# Patient Record
Sex: FEMALE | Race: WHITE | NOT HISPANIC OR LATINO
[De-identification: names, ages, dates, MRNs, and addresses within clinical notes are randomized per-mention and may not be internally consistent; named-entity substitution may affect disease eponyms.]

---

## 2021-02-03 ENCOUNTER — NON-APPOINTMENT (OUTPATIENT)
Age: 56
End: 2021-02-03

## 2021-02-09 ENCOUNTER — NON-APPOINTMENT (OUTPATIENT)
Age: 56
End: 2021-02-09

## 2021-02-09 ENCOUNTER — APPOINTMENT (OUTPATIENT)
Dept: HEART AND VASCULAR | Facility: CLINIC | Age: 56
End: 2021-02-09
Payer: COMMERCIAL

## 2021-02-09 VITALS
DIASTOLIC BLOOD PRESSURE: 74 MMHG | WEIGHT: 250 LBS | HEART RATE: 118 BPM | SYSTOLIC BLOOD PRESSURE: 115 MMHG | TEMPERATURE: 96.1 F | HEIGHT: 65 IN | BODY MASS INDEX: 41.65 KG/M2

## 2021-02-09 DIAGNOSIS — N28.9 DISORDER OF KIDNEY AND URETER, UNSPECIFIED: ICD-10-CM

## 2021-02-09 PROBLEM — Z00.00 ENCOUNTER FOR PREVENTIVE HEALTH EXAMINATION: Status: ACTIVE | Noted: 2021-02-09

## 2021-02-09 PROCEDURE — 99204 OFFICE O/P NEW MOD 45 MIN: CPT | Mod: 25

## 2021-02-09 PROCEDURE — 99072 ADDL SUPL MATRL&STAF TM PHE: CPT

## 2021-02-09 PROCEDURE — 93000 ELECTROCARDIOGRAM COMPLETE: CPT

## 2021-02-09 RX ORDER — ALLOPURINOL 100 MG/1
100 TABLET ORAL
Refills: 0 | Status: ACTIVE | COMMUNITY

## 2021-02-09 NOTE — PHYSICAL EXAM
[General Appearance - Well Developed] : well developed [Normal Appearance] : normal appearance [Well Groomed] : well groomed [General Appearance - Well Nourished] : well nourished [No Deformities] : no deformities [General Appearance - In No Acute Distress] : no acute distress [Normal Conjunctiva] : the conjunctiva exhibited no abnormalities [Eyelids - No Xanthelasma] : the eyelids demonstrated no xanthelasmas [Normal Oral Mucosa] : normal oral mucosa [No Oral Pallor] : no oral pallor [No Oral Cyanosis] : no oral cyanosis [Normal Jugular Venous A Waves Present] : normal jugular venous A waves present [Normal Jugular Venous V Waves Present] : normal jugular venous V waves present [No Jugular Venous Hong A Waves] : no jugular venous hong A waves [5th Left ICS - MCL] : palpated at the 5th LICS in the midclavicular line [Normal] : normal [Tachycardia] : tachycardic [Regularly Irregular] : regularly irregular [II] : a grade 2 [No Pitting Edema] : no pitting edema present [Abdomen Soft] : soft [Abdomen Tenderness] : non-tender [Abdomen Mass (___ Cm)] : no abdominal mass palpated [Gait - Sufficient For Exercise Testing] : the gait was sufficient for exercise testing [Abnormal Walk] : normal gait [Nail Clubbing] : no clubbing of the fingernails [Cyanosis, Localized] : no localized cyanosis [Petechial Hemorrhages (___cm)] : no petechial hemorrhages [Skin Color & Pigmentation] : normal skin color and pigmentation [] : no rash [No Venous Stasis] : no venous stasis [Skin Lesions] : no skin lesions [No Skin Ulcers] : no skin ulcer [No Xanthoma] : no  xanthoma was observed [Oriented To Time, Place, And Person] : oriented to person, place, and time [Affect] : the affect was normal [Mood] : the mood was normal [No Anxiety] : not feeling anxious

## 2021-02-09 NOTE — HISTORY OF PRESENT ILLNESS
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 Conner.  She had recurrent atypical atrial flutter now s/p DCCV 12/2020.  She reports DCCV lasted for a matter of hours and she was [place] [facility] [place]  After discussion with Dr. Nolen, she was started on Multaq 400 mg BID a week ago.  She continues to feel fatigued, SOB and notes palpitations.  She is eager to pursue another ablation procedure.  She has been on Xarelto uninterrupted and denies any bleeding issues.\par \par Thinks she had an echo within the last two years with Dr. Yoder.\par She is on day 8/10 for treatment of UTI.\par

## 2021-02-14 ENCOUNTER — APPOINTMENT (OUTPATIENT)
Dept: DISASTER EMERGENCY | Facility: CLINIC | Age: 56
End: 2021-02-14

## 2021-02-14 DIAGNOSIS — Z01.818 ENCOUNTER FOR OTHER PREPROCEDURAL EXAMINATION: ICD-10-CM

## 2021-02-15 LAB — SARS-COV-2 N GENE NPH QL NAA+PROBE: NOT DETECTED

## 2021-02-17 ENCOUNTER — INPATIENT (INPATIENT)
Facility: HOSPITAL | Age: 56
LOS: 0 days | Discharge: HOME | DRG: 274 | End: 2021-02-18
Attending: INTERNAL MEDICINE | Admitting: INTERNAL MEDICINE
Payer: COMMERCIAL

## 2021-02-17 VITALS
TEMPERATURE: 97 F | HEART RATE: 117 BPM | DIASTOLIC BLOOD PRESSURE: 60 MMHG | OXYGEN SATURATION: 97 % | RESPIRATION RATE: 16 BRPM | SYSTOLIC BLOOD PRESSURE: 123 MMHG

## 2021-02-17 DIAGNOSIS — Z98.890 OTHER SPECIFIED POSTPROCEDURAL STATES: Chronic | ICD-10-CM

## 2021-02-17 DIAGNOSIS — Z95.2 PRESENCE OF PROSTHETIC HEART VALVE: Chronic | ICD-10-CM

## 2021-02-17 LAB
ANION GAP SERPL CALC-SCNC: 13 MMOL/L — SIGNIFICANT CHANGE UP (ref 5–17)
APTT BLD: 46.3 SEC — HIGH (ref 27.5–35.5)
BUN SERPL-MCNC: 20 MG/DL — SIGNIFICANT CHANGE UP (ref 7–23)
CALCIUM SERPL-MCNC: 9.2 MG/DL — SIGNIFICANT CHANGE UP (ref 8.4–10.5)
CHLORIDE SERPL-SCNC: 104 MMOL/L — SIGNIFICANT CHANGE UP (ref 96–108)
CO2 SERPL-SCNC: 23 MMOL/L — SIGNIFICANT CHANGE UP (ref 22–31)
CREAT SERPL-MCNC: 1.04 MG/DL — SIGNIFICANT CHANGE UP (ref 0.5–1.3)
GLUCOSE SERPL-MCNC: 123 MG/DL — HIGH (ref 70–99)
HCT VFR BLD CALC: 37 % — SIGNIFICANT CHANGE UP (ref 34.5–45)
HGB BLD-MCNC: 11.4 G/DL — LOW (ref 11.5–15.5)
INR BLD: 3.45 — HIGH (ref 0.88–1.16)
MCHC RBC-ENTMCNC: 29.9 PG — SIGNIFICANT CHANGE UP (ref 27–34)
MCHC RBC-ENTMCNC: 30.8 GM/DL — LOW (ref 32–36)
MCV RBC AUTO: 97.1 FL — SIGNIFICANT CHANGE UP (ref 80–100)
NRBC # BLD: 0 /100 WBCS — SIGNIFICANT CHANGE UP (ref 0–0)
PLATELET # BLD AUTO: 578 K/UL — HIGH (ref 150–400)
POTASSIUM SERPL-MCNC: 4.3 MMOL/L — SIGNIFICANT CHANGE UP (ref 3.5–5.3)
POTASSIUM SERPL-SCNC: 4.3 MMOL/L — SIGNIFICANT CHANGE UP (ref 3.5–5.3)
PROTHROM AB SERPL-ACNC: 39 SEC — HIGH (ref 10.6–13.6)
RBC # BLD: 3.81 M/UL — SIGNIFICANT CHANGE UP (ref 3.8–5.2)
RBC # FLD: 15.1 % — HIGH (ref 10.3–14.5)
SODIUM SERPL-SCNC: 140 MMOL/L — SIGNIFICANT CHANGE UP (ref 135–145)
WBC # BLD: 22.69 K/UL — HIGH (ref 3.8–10.5)
WBC # FLD AUTO: 22.69 K/UL — HIGH (ref 3.8–10.5)

## 2021-02-17 RX ORDER — DRONEDARONE 400 MG/1
400 TABLET, FILM COATED ORAL
Refills: 0 | Status: DISCONTINUED | OUTPATIENT
Start: 2021-02-17 | End: 2021-02-18

## 2021-02-17 RX ORDER — BENZOCAINE AND MENTHOL 5; 1 G/100ML; G/100ML
1 LIQUID ORAL ONCE
Refills: 0 | Status: COMPLETED | OUTPATIENT
Start: 2021-02-17 | End: 2021-02-17

## 2021-02-17 RX ORDER — METOPROLOL TARTRATE 50 MG
50 TABLET ORAL
Refills: 0 | Status: DISCONTINUED | OUTPATIENT
Start: 2021-02-17 | End: 2021-02-18

## 2021-02-17 RX ORDER — ACETAMINOPHEN 500 MG
650 TABLET ORAL EVERY 4 HOURS
Refills: 0 | Status: DISCONTINUED | OUTPATIENT
Start: 2021-02-17 | End: 2021-02-18

## 2021-02-17 RX ORDER — COLCHICINE 0.6 MG
0.6 TABLET ORAL
Refills: 0 | Status: DISCONTINUED | OUTPATIENT
Start: 2021-02-17 | End: 2021-02-18

## 2021-02-17 RX ORDER — SUCRALFATE 1 G
1 TABLET ORAL
Refills: 0 | Status: DISCONTINUED | OUTPATIENT
Start: 2021-02-17 | End: 2021-02-18

## 2021-02-17 RX ORDER — ALLOPURINOL 300 MG
100 TABLET ORAL DAILY
Refills: 0 | Status: DISCONTINUED | OUTPATIENT
Start: 2021-02-17 | End: 2021-02-18

## 2021-02-17 RX ORDER — COLCHICINE 0.6 MG
0.6 TABLET ORAL DAILY
Refills: 0 | Status: DISCONTINUED | OUTPATIENT
Start: 2021-02-17 | End: 2021-02-17

## 2021-02-17 RX ORDER — RIVAROXABAN 15 MG-20MG
20 KIT ORAL DAILY
Refills: 0 | Status: DISCONTINUED | OUTPATIENT
Start: 2021-02-17 | End: 2021-02-18

## 2021-02-17 RX ORDER — INFLUENZA VIRUS VACCINE 15; 15; 15; 15 UG/.5ML; UG/.5ML; UG/.5ML; UG/.5ML
0.5 SUSPENSION INTRAMUSCULAR ONCE
Refills: 0 | Status: DISCONTINUED | OUTPATIENT
Start: 2021-02-17 | End: 2021-02-18

## 2021-02-17 RX ORDER — PANTOPRAZOLE SODIUM 20 MG/1
40 TABLET, DELAYED RELEASE ORAL
Refills: 0 | Status: DISCONTINUED | OUTPATIENT
Start: 2021-02-17 | End: 2021-02-18

## 2021-02-17 RX ORDER — FUROSEMIDE 40 MG
20 TABLET ORAL DAILY
Refills: 0 | Status: DISCONTINUED | OUTPATIENT
Start: 2021-02-18 | End: 2021-02-18

## 2021-02-17 RX ADMIN — Medication 650 MILLIGRAM(S): at 20:37

## 2021-02-17 RX ADMIN — Medication 0.6 MILLIGRAM(S): at 20:20

## 2021-02-17 RX ADMIN — BENZOCAINE AND MENTHOL 1 LOZENGE: 5; 1 LIQUID ORAL at 23:58

## 2021-02-17 RX ADMIN — Medication 1 GRAM(S): at 20:21

## 2021-02-17 RX ADMIN — RIVAROXABAN 20 MILLIGRAM(S): KIT at 21:44

## 2021-02-17 RX ADMIN — Medication 50 MILLIGRAM(S): at 20:23

## 2021-02-17 RX ADMIN — DRONEDARONE 400 MILLIGRAM(S): 400 TABLET, FILM COATED ORAL at 20:20

## 2021-02-17 RX ADMIN — Medication 100 MILLIGRAM(S): at 20:21

## 2021-02-17 NOTE — H&P ADULT - NSICDXPASTSURGICALHX_GEN_ALL_CORE_FT
PAST SURGICAL HISTORY:  H/O aortic valve replacement 2012 at [facility]    History of cardiac radiofrequency ablation 2/2020 for afib/flutter    History of gastric surgery      no

## 2021-02-17 NOTE — H&P ADULT - NSICDXPASTMEDICALHX_GEN_ALL_CORE_FT
PAST MEDICAL HISTORY:  Afib     Atrial fibrillation and flutter     Congenital polycystic kidney     H/O aortic valve stenosis     Obesity

## 2021-02-17 NOTE — H&P ADULT - ASSESSMENT
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 Conner.  She had recurrent atypical atrial flutter and had DCCV on 12/2020.  She sought second opinion for management.  Remains in afl despite multaq.  She is here for afl ablation.    - NPO  - Bedrest post procedure per protocol  - will likely continue all meds  - xarelto 6 hours post ablation

## 2021-02-17 NOTE — CHART NOTE - NSCHARTNOTEFT_GEN_A_CORE
Pre-op Diagnosis:  Atypical atrial flutter  Atrial fibrillation    Post-op Diagnosis:  Same    Procedure:  Catheter ablation of atypical atrial flutter and atrial fibrillation    Electrophysiologist:  GRISELDA Nolen MD    Assistant:  ERICA Reyes MD    Anesthesia:  General anesthesia    Access:  RFV x 1  LFV x 2    Description:  Successful pulmonary vein isolation  Successful atypical atrial flutter ablation (micro-re-entrant anterior to the RSPV)  ms  Non-clinical atrial tachycardia at  ms cardioverted  No change in baseline small pericardial effusion from beginning to end of case    Complications:  None    EBL:  10 cc    Disposition:  Stale    Plan:  - Bedrest x 6 hrs then resume home dose of anticoagulation  - Resume all other home medications  - Anticipate discharge tomorrow AM  - Lasix 20 mg IV x 2 given during the case

## 2021-02-17 NOTE — H&P ADULT - HISTORY OF PRESENT ILLNESS
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 Conner.  She had recurrent atypical atrial flutter now s/p DCCV 12/2020.  She reports DCCV lasted for a matter of hours and she was recommended to undergo another ablation procedure at [facility] [place].  After discussion with Dr. Nolen, she was started on Multaq 400 mg BID a few weeks ago.  She continues to feel fatigued, SOB and notes palpitations.  She is eager to pursue another ablation procedure.  She has been on Xarelto uninterrupted and denies any bleeding issues.

## 2021-02-17 NOTE — H&P ADULT - RS GEN HX ROS MEA POS PC
WEIGHT:      Pt was in th office for his / her 3rd weight Loss appointment. Pt lost 0 lbs since his / her last appointment and has lost 5% of her EBW. Pt is down 6 lbs since starting. Rd / Ld educated the pt on Mindful versus Mindless Eating. Pt is aware he / she must meet his / her pre-op weight loss goal of 278 lbs in order to proceed with weight loss surgery. Rd / Ld faxed a copy of what was reviewed with the pt to her PCP. Pt verbalized understanding. Rd / Ld reviewed insurance company weight loss requirement on 3/13/20. Pt verbalized understanding. Please be aware at each visit you have been instructed that in order for your insurance company to approve your surgery you must show a consistent weight loss of 2 lbs or greater at each visit. We can not guarantee an approval by your insurance company we can only provide the information given to us it is up to you the patient to show compliancy to your insurance company.   If you do gain weight during your supervised weight loss counseling sessions insurance companies starting in 2018 are denying patients for not showing consistent weight loss results when part of a supervised weight loss counseling program.
dyspnea

## 2021-02-18 ENCOUNTER — TRANSCRIPTION ENCOUNTER (OUTPATIENT)
Age: 56
End: 2021-02-18

## 2021-02-18 VITALS — TEMPERATURE: 98 F

## 2021-02-18 PROBLEM — I48.91 UNSPECIFIED ATRIAL FIBRILLATION: Chronic | Status: ACTIVE | Noted: 2021-02-17

## 2021-02-18 PROBLEM — Z86.79 PERSONAL HISTORY OF OTHER DISEASES OF THE CIRCULATORY SYSTEM: Chronic | Status: ACTIVE | Noted: 2021-02-17

## 2021-02-18 PROBLEM — Q61.3 POLYCYSTIC KIDNEY, UNSPECIFIED: Chronic | Status: ACTIVE | Noted: 2021-02-17

## 2021-02-18 PROBLEM — E66.9 OBESITY, UNSPECIFIED: Chronic | Status: ACTIVE | Noted: 2021-02-17

## 2021-02-18 LAB
ANION GAP SERPL CALC-SCNC: 14 MMOL/L — SIGNIFICANT CHANGE UP (ref 5–17)
BASOPHILS # BLD AUTO: 0 K/UL — SIGNIFICANT CHANGE UP (ref 0–0.2)
BASOPHILS NFR BLD AUTO: 0 % — SIGNIFICANT CHANGE UP (ref 0–2)
BUN SERPL-MCNC: 21 MG/DL — SIGNIFICANT CHANGE UP (ref 7–23)
CALCIUM SERPL-MCNC: 8.3 MG/DL — LOW (ref 8.4–10.5)
CHLORIDE SERPL-SCNC: 105 MMOL/L — SIGNIFICANT CHANGE UP (ref 96–108)
CO2 SERPL-SCNC: 21 MMOL/L — LOW (ref 22–31)
CREAT SERPL-MCNC: 0.91 MG/DL — SIGNIFICANT CHANGE UP (ref 0.5–1.3)
DACRYOCYTES BLD QL SMEAR: SLIGHT — SIGNIFICANT CHANGE UP
EOSINOPHIL # BLD AUTO: 0 K/UL — SIGNIFICANT CHANGE UP (ref 0–0.5)
EOSINOPHIL NFR BLD AUTO: 0 % — SIGNIFICANT CHANGE UP (ref 0–6)
GIANT PLATELETS BLD QL SMEAR: PRESENT — SIGNIFICANT CHANGE UP
GLUCOSE SERPL-MCNC: 128 MG/DL — HIGH (ref 70–99)
HCT VFR BLD CALC: 32.4 % — LOW (ref 34.5–45)
HCV AB S/CO SERPL IA: 0.16 S/CO — SIGNIFICANT CHANGE UP
HCV AB SERPL-IMP: SIGNIFICANT CHANGE UP
HGB BLD-MCNC: 9.9 G/DL — LOW (ref 11.5–15.5)
LYMPHOCYTES # BLD AUTO: 0.18 K/UL — LOW (ref 1–3.3)
LYMPHOCYTES # BLD AUTO: 0.9 % — LOW (ref 13–44)
MAGNESIUM SERPL-MCNC: 1.9 MG/DL — SIGNIFICANT CHANGE UP (ref 1.6–2.6)
MANUAL SMEAR VERIFICATION: SIGNIFICANT CHANGE UP
MCHC RBC-ENTMCNC: 29.6 PG — SIGNIFICANT CHANGE UP (ref 27–34)
MCHC RBC-ENTMCNC: 30.6 GM/DL — LOW (ref 32–36)
MCV RBC AUTO: 96.7 FL — SIGNIFICANT CHANGE UP (ref 80–100)
MONOCYTES # BLD AUTO: 1.38 K/UL — HIGH (ref 0–0.9)
MONOCYTES NFR BLD AUTO: 7 % — SIGNIFICANT CHANGE UP (ref 2–14)
MYELOCYTES NFR BLD: 5.3 % — HIGH (ref 0–0)
NEUTROPHILS # BLD AUTO: 17.11 K/UL — HIGH (ref 1.8–7.4)
NEUTROPHILS NFR BLD AUTO: 85.1 % — HIGH (ref 43–77)
NEUTS BAND # BLD: 1.7 % — SIGNIFICANT CHANGE UP (ref 0–8)
OVALOCYTES BLD QL SMEAR: SLIGHT — SIGNIFICANT CHANGE UP
PLAT MORPH BLD: NORMAL — SIGNIFICANT CHANGE UP
PLATELET # BLD AUTO: 528 K/UL — HIGH (ref 150–400)
POLYCHROMASIA BLD QL SMEAR: SLIGHT — SIGNIFICANT CHANGE UP
POTASSIUM SERPL-MCNC: 4.1 MMOL/L — SIGNIFICANT CHANGE UP (ref 3.5–5.3)
POTASSIUM SERPL-SCNC: 4.1 MMOL/L — SIGNIFICANT CHANGE UP (ref 3.5–5.3)
RBC # BLD: 3.35 M/UL — LOW (ref 3.8–5.2)
RBC # FLD: 15.5 % — HIGH (ref 10.3–14.5)
RBC BLD AUTO: ABNORMAL
SODIUM SERPL-SCNC: 140 MMOL/L — SIGNIFICANT CHANGE UP (ref 135–145)
WBC # BLD: 19.71 K/UL — HIGH (ref 3.8–10.5)
WBC # FLD AUTO: 19.71 K/UL — HIGH (ref 3.8–10.5)

## 2021-02-18 PROCEDURE — 93306 TTE W/DOPPLER COMPLETE: CPT | Mod: 26

## 2021-02-18 RX ORDER — METOPROLOL TARTRATE 50 MG
1 TABLET ORAL
Qty: 0 | Refills: 0 | DISCHARGE

## 2021-02-18 RX ORDER — METOPROLOL TARTRATE 50 MG
1 TABLET ORAL
Qty: 60 | Refills: 0
Start: 2021-02-18 | End: 2021-03-19

## 2021-02-18 RX ORDER — SUCRALFATE 1 G
1 TABLET ORAL
Qty: 60 | Refills: 0
Start: 2021-02-18 | End: 2021-03-19

## 2021-02-18 RX ORDER — PANTOPRAZOLE SODIUM 20 MG/1
1 TABLET, DELAYED RELEASE ORAL
Qty: 30 | Refills: 0
Start: 2021-02-18 | End: 2021-03-19

## 2021-02-18 RX ORDER — COLCHICINE 0.6 MG
1 TABLET ORAL
Qty: 14 | Refills: 0
Start: 2021-02-18 | End: 2021-02-24

## 2021-02-18 RX ORDER — FUROSEMIDE 40 MG
1 TABLET ORAL
Qty: 4 | Refills: 0
Start: 2021-02-18 | End: 2021-02-21

## 2021-02-18 RX ADMIN — Medication 650 MILLIGRAM(S): at 05:31

## 2021-02-18 RX ADMIN — Medication 0.6 MILLIGRAM(S): at 05:28

## 2021-02-18 RX ADMIN — Medication 20 MILLIGRAM(S): at 06:54

## 2021-02-18 RX ADMIN — Medication 1 GRAM(S): at 05:28

## 2021-02-18 RX ADMIN — DRONEDARONE 400 MILLIGRAM(S): 400 TABLET, FILM COATED ORAL at 06:54

## 2021-02-18 RX ADMIN — PANTOPRAZOLE SODIUM 40 MILLIGRAM(S): 20 TABLET, DELAYED RELEASE ORAL at 06:54

## 2021-02-18 NOTE — DISCHARGE NOTE PROVIDER - NSDCMRMEDTOKEN_GEN_ALL_CORE_FT
allopurinol 100 mg oral tablet: 1 tab(s) orally once a day (at bedtime)  Multaq 400 mg oral tablet: 1 tab(s) orally 2 times a day  Toprol-XL 50 mg oral tablet, extended release: 1 tab(s) orally 2 times a day  Xarelto 20 mg oral tablet: 1 tab(s) orally once a day (in the evening)   allopurinol 100 mg oral tablet: 1 tab(s) orally once a day (at bedtime)  colchicine 0.6 mg oral tablet: 1 tab(s) orally 2 times a day  furosemide 20 mg oral tablet: 1 tab(s) orally once a day  Multaq 400 mg oral tablet: 1 tab(s) orally 2 times a day  pantoprazole 40 mg oral delayed release tablet: 1 tab(s) orally once a day (before a meal)  sucralfate 1 g oral tablet: 1 tab(s) orally 2 times a day  Toprol-XL 50 mg oral tablet, extended release: 1 tab(s) orally 2 times a day  Xarelto 20 mg oral tablet: 1 tab(s) orally once a day (in the evening)   allopurinol 100 mg oral tablet: 1 tab(s) orally once a day (at bedtime)  colchicine 0.6 mg oral tablet: 1 tab(s) orally 2 times a day  furosemide 20 mg oral tablet: 1 tab(s) orally once a day  metoprolol succinate 25 mg oral tablet, extended release: 1 tab(s) orally 2 times a day   Multaq 400 mg oral tablet: 1 tab(s) orally 2 times a day  pantoprazole 40 mg oral delayed release tablet: 1 tab(s) orally once a day (before a meal)  sucralfate 1 g oral tablet: 1 tab(s) orally 2 times a day  Xarelto 20 mg oral tablet: 1 tab(s) orally once a day (in the evening)

## 2021-02-18 NOTE — DISCHARGE NOTE PROVIDER - NSDCCPCAREPLAN_GEN_ALL_CORE_FT
PRINCIPAL DISCHARGE DIAGNOSIS  Diagnosis: S/P ablation of atrial fibrillation  Assessment and Plan of Treatment:

## 2021-02-18 NOTE — DISCHARGE NOTE PROVIDER - CARE PROVIDER_API CALL
Gume Nolen (MD)  Cardiac Electrophysiology; Cardiovascular Disease  [address] [place] [address]  Phone: [phone]  Fax: [phone]  Established Patient  Follow Up Time:    Gume Nolen)  Cardiac Electrophysiology; Cardiovascular Disease  [address] [place] [address]  Phone: [phone]  Fax: [phone]  Established Patient  Scheduled Appointment: 03/16/2021 03:30 PM

## 2021-02-18 NOTE — DISCHARGE NOTE NURSING/CASE MANAGEMENT/SOCIAL WORK - PATIENT PORTAL LINK FT
You can access the FollowMyHealth Patient Portal offered by [facility] by registering at the following website: [email or web address]. By joining [employer]’s FollowMyHealth portal, you will also be able to view your health information using other applications (apps) compatible with our system.

## 2021-02-18 NOTE — DISCHARGE NOTE PROVIDER - NSDCFUADDINST_GEN_ALL_CORE_FT
You had ablation of atrial fibrillation, atrial flutter on 2/17/2021  Wound care instruction:  Avoid strenuous activities such as lifting, running, pushing or sex for 1 week in order to avoid bleeding complications in the groin.  If any questions about the groin, call us at [phone].  Ok to shower tonight.  Avoid bathing for 1 week

## 2021-02-18 NOTE — DISCHARGE NOTE PROVIDER - PROVIDER TOKENS
PROVIDER:[TOKEN:[[id number]],ESTABLISHEDPATIENT:[T]] PROVIDER:[TOKEN:[[id number]],SCHEDULEDAPPT:[03/16/2021],SCHEDULEDAPPTTIME:[03:30 PM],ESTABLISHEDPATIENT:[T]]

## 2021-02-18 NOTE — DISCHARGE NOTE PROVIDER - HOSPITAL COURSE
Dudley Dennison is a 54 yo F with hx of congenital right kidney abnormality/ cystic kidney disease, morbid obesity s/p gastric sleeve surgery (2016), atrial fibrillation/flutter and AS s/p AV replacement (2021 at [facility]) s/p DCCV 2019, atrial flutter ablation 2/2020 with Dr Conner,  She now has recurrent atypical atrial flutter now s/p DCCV 12/2020.  Pt reports that DCCV lasted for hours and she was recommended to [place] [facility] [place]  After discussion with Dr Nolen, she was started on Multaq 400mg BID a few weeks ago.  She continued to feel fatigue, SOB and palpitations.  She remained on Xarelto uninterrupted and denied any bleeding issues.   On 2/17/21 catheter ablation of atypical atrial flutter and atrial fibrillation.  Non clinical Atrial tachycardia at the end of the case and patient was cardioverted. Small pericardial effusion noted at end of case.   Vital signs Dudley Dennison is a 56 yo F with hx of congenital right kidney abnormality/ cystic kidney disease, morbid obesity s/p gastric sleeve surgery (2016), atrial fibrillation/flutter and AS s/p AV replacement (2021 at [facility]) s/p DCCV 2019, atrial flutter ablation 2/2020 with Dr Conner,  She now has recurrent atypical atrial flutter now s/p DCCV 12/2020.  Pt reports that DCCV lasted for hours and she was recommended to undergo another ablation procedure at [facility] [place].  After discussion with Dr Nolen, she was started on Multaq 400mg BID a few weeks ago.  She continued to feel fatigue, SOB and palpitations.  She remained on Xarelto uninterrupted and denied any bleeding issues.   On 2/17/21 catheter ablation of atypical atrial flutter and atrial fibrillation.  Non clinical Atrial tachycardia at the end of the case and patient was cardioverted. Small pericardial effusion noted at end of case.  patient remains in NSR.  Patient cleared for discharge home.    Physical Exam:   Appearance: Normal	  HEENT:   Normal oral mucosa	  Neck: Supple, -JVD  Cardiovascular: Normal S1 S2, No murmurs  Respiratory: Lungs clear to auscultation  Gastrointestinal:  Soft, Non-tender, + BS	  Extremities: BLE warm, dry  trace edema.  Bilateral groin sites soft, no drainage or erythema, slight ecchymosis. No hematoma.  ENRIQUE  Neurologic: Non-focal  Psychiatry: A & O x 3, Mood & affect appropriate     Dudley Dennison is a 54 yo F with hx of congenital right kidney abnormality/ cystic kidney disease, morbid obesity s/p gastric sleeve surgery (2016), atrial fibrillation/flutter and AS s/p AV replacement (2021 at [facility]) s/p DCCV 2019, atrial flutter ablation 2/2020 with Dr Conner,  She now has recurrent atypical atrial flutter now s/p DCCV 12/2020.  Pt reports that DCCV lasted for hours and she was [place] to [place] [facility] [place]  After discussion with Dr Nolen, she was started on Multaq 400mg BID a few weeks ago.  She continued to feel fatigue, SOB and palpitations.  She remained on Xarelto uninterrupted and denied any bleeding issues.   On 2/17/21 catheter ablation of atypical atrial flutter and atrial fibrillation.  Non clinical Atrial tachycardia at the end of the case and patient was cardioverted. Patient remains in NSR.  Patient cleared for discharge home.    Physical Exam:   Appearance: Normal	  HEENT:   Normal oral mucosa	  Neck: Supple, -JVD  Cardiovascular: Normal S1 S2, No murmurs  Respiratory: Lungs clear to auscultation  Gastrointestinal:  Soft, Non-tender, + BS	  Extremities: BLE warm, dry  trace edema.  Bilateral groin sites soft, no drainage or erythema, slight ecchymosis. No hematoma.  ENRIQUE  Neurologic: Non-focal  Psychiatry: A & O x 3, Mood & affect appropriate     Dudley Dennison is a 54 yo F with hx of congenital right kidney abnormality/ cystic kidney disease, morbid obesity s/p gastric sleeve surgery (2016), atrial fibrillation/flutter and AS s/p AV replacement (2021 at [facility]) s/p DCCV 2019, atrial flutter ablation 2/2020 with Dr Conner,  She now has recurrent atypical atrial flutter now s/p DCCV 12/2020.  Pt reports that DCCV lasted for hours and she was recommended to undergo another ablation procedure [place] [facility].  After discussion with Dr Nolen, she was started on Multaq 400mg BID a few weeks ago.  She continued to feel fatigue, SOB and palpitations.  She remained on Xarelto uninterrupted and denied any bleeding issues.   On 2/17/21 catheter ablation of atypical atrial flutter and atrial fibrillation.  Non clinical Atrial tachycardia at the end of the case and patient was cardioverted. Patient remains in NSR. Echocardiogram revealed EF 45%.  BPs in 90s-100.  Metoprolol decreased from 50 BID to 25 BID.  No ACE-I started due to blood pressure.   Patient cleared for discharge home.    Physical Exam:   Appearance: Normal	  HEENT:   Normal oral mucosa	  Neck: Supple, -JVD  Cardiovascular: Normal S1 S2, No murmurs  Respiratory: Lungs clear to auscultation  Gastrointestinal:  Soft, Non-tender, + BS	  Extremities: BLE warm, dry  trace edema.  Bilateral groin sites soft, no drainage or erythema, slight ecchymosis. No hematoma.  ENRIQUE  Neurologic: Non-focal  Psychiatry: A & O x 3, Mood & affect appropriate     Dudley Dennison is a 54 yo F with hx of congenital right kidney abnormality/ cystic kidney disease, morbid obesity s/p gastric sleeve surgery (2016), atrial fibrillation/flutter and AS s/p AV replacement (2021 at [facility]) s/p DCCV 2019, atrial flutter ablation 2/2020 with Dr Conner,  She now has recurrent atypical atrial flutter now s/p DCCV 12/2020.  Pt reports that DCCV lasted for hours and she was recommended to undergo another [place] [facility] [place]  After discussion with Dr Nolen, she was started on Multaq 400mg BID a few weeks ago.  She continued to feel fatigue, SOB and palpitations.  She remained on Xarelto uninterrupted and denied any bleeding issues.   On 2/17/21 catheter ablation of atypical atrial flutter and atrial fibrillation.  Non clinical Atrial tachycardia at the end of the case and patient was cardioverted. Patient remains in NSR. Echocardiogram revealed EF 45%.  BPs in 90s-100.  Metoprolol decreased from 50 BID to 25 BID.  No ACE-I started due to low blood pressure.   Patient cleared for discharge home.    Physical Exam:   Appearance: Normal	  HEENT:   Normal oral mucosa	  Neck: Supple, -JVD  Cardiovascular: Normal S1 S2, No murmurs  Respiratory: Lungs clear to auscultation  Gastrointestinal:  Soft, Non-tender, + BS	  Extremities: BLE warm, dry  trace edema.  Bilateral groin sites soft, no drainage or erythema, slight ecchymosis. No hematoma.  ENRIQUE  Neurologic: Non-focal  Psychiatry: A & O x 3, Mood & affect appropriate

## 2021-02-18 NOTE — DISCHARGE NOTE PROVIDER - NSDCCPTREATMENT_GEN_ALL_CORE_FT
PRINCIPAL PROCEDURE  Procedure: Ablation, arrhythmogenic focus, for atrial fibrillation after pulmonary vein isolation  Findings and Treatment:

## 2021-02-18 NOTE — DISCHARGE NOTE PROVIDER - CARE PROVIDERS DIRECT ADDRESSES
[email or web address]

## 2021-03-02 DIAGNOSIS — I48.91 UNSPECIFIED ATRIAL FIBRILLATION: ICD-10-CM

## 2021-03-02 DIAGNOSIS — Z95.3 PRESENCE OF XENOGENIC HEART VALVE: ICD-10-CM

## 2021-03-02 DIAGNOSIS — Z79.01 LONG TERM (CURRENT) USE OF ANTICOAGULANTS: ICD-10-CM

## 2021-03-02 DIAGNOSIS — Q61.19 OTHER POLYCYSTIC KIDNEY, INFANTILE TYPE: ICD-10-CM

## 2021-03-02 DIAGNOSIS — I48.4 ATYPICAL ATRIAL FLUTTER: ICD-10-CM

## 2021-03-02 DIAGNOSIS — I47.1 SUPRAVENTRICULAR TACHYCARDIA: ICD-10-CM

## 2021-03-02 DIAGNOSIS — I48.0 PAROXYSMAL ATRIAL FIBRILLATION: ICD-10-CM

## 2021-03-02 DIAGNOSIS — E66.01 MORBID (SEVERE) OBESITY DUE TO EXCESS CALORIES: ICD-10-CM

## 2021-03-02 DIAGNOSIS — Z88.0 ALLERGY STATUS TO PENICILLIN: ICD-10-CM

## 2021-03-02 DIAGNOSIS — Z98.84 BARIATRIC SURGERY STATUS: ICD-10-CM

## 2021-03-04 PROCEDURE — 36415 COLL VENOUS BLD VENIPUNCTURE: CPT

## 2021-03-04 PROCEDURE — C1766: CPT

## 2021-03-04 PROCEDURE — 80048 BASIC METABOLIC PNL TOTAL CA: CPT

## 2021-03-04 PROCEDURE — 85027 COMPLETE CBC AUTOMATED: CPT

## 2021-03-04 PROCEDURE — C1894: CPT

## 2021-03-04 PROCEDURE — 85610 PROTHROMBIN TIME: CPT

## 2021-03-04 PROCEDURE — C1730: CPT

## 2021-03-04 PROCEDURE — C1732: CPT

## 2021-03-04 PROCEDURE — 93306 TTE W/DOPPLER COMPLETE: CPT

## 2021-03-04 PROCEDURE — C1759: CPT

## 2021-03-04 PROCEDURE — 83735 ASSAY OF MAGNESIUM: CPT

## 2021-03-04 PROCEDURE — C2630: CPT

## 2021-03-04 PROCEDURE — 85025 COMPLETE CBC W/AUTO DIFF WBC: CPT

## 2021-03-04 PROCEDURE — 85730 THROMBOPLASTIN TIME PARTIAL: CPT

## 2021-03-04 PROCEDURE — 86803 HEPATITIS C AB TEST: CPT

## 2021-03-09 ENCOUNTER — APPOINTMENT (OUTPATIENT)
Dept: HEART AND VASCULAR | Facility: CLINIC | Age: 56
End: 2021-03-09
Payer: COMMERCIAL

## 2021-03-09 ENCOUNTER — NON-APPOINTMENT (OUTPATIENT)
Age: 56
End: 2021-03-09

## 2021-03-09 VITALS
DIASTOLIC BLOOD PRESSURE: 62 MMHG | HEIGHT: 65 IN | HEART RATE: 58 BPM | SYSTOLIC BLOOD PRESSURE: 110 MMHG | BODY MASS INDEX: 43.32 KG/M2 | WEIGHT: 260 LBS

## 2021-03-09 VITALS — TEMPERATURE: 98.1 F

## 2021-03-09 PROCEDURE — 99072 ADDL SUPL MATRL&STAF TM PHE: CPT

## 2021-03-09 PROCEDURE — 99204 OFFICE O/P NEW MOD 45 MIN: CPT | Mod: 25

## 2021-03-09 PROCEDURE — 93000 ELECTROCARDIOGRAM COMPLETE: CPT

## 2021-03-09 NOTE — REVIEW OF SYSTEMS
[Headache] : headache [Recent Weight Gain (___ Lbs)] : recent [unfilled] ~Ulb weight gain [Feeling Fatigued] : feeling fatigued [Dyspnea on exertion] : dyspnea during exertion [see HPI] : see HPI [Dysuria] : dysuria [Negative] : Heme/Lymph

## 2021-03-11 NOTE — DISCUSSION/SUMMARY
[FreeTextEntry1] : EKG:NSR\par has chronic pedal edema- feel fatigue due to c DIF/anemia- possibly Multaq- cont Multaq+ Xarelto for af\par get copy of lipids( states good) \par reveiwed records\par \par addemndum: pt now tells me that she occasionally gets fevers- advised to see Dr Zhu(PCP

## 2021-03-11 NOTE — PHYSICAL EXAM
[General Appearance - Well Developed] : well developed [Normal Appearance] : normal appearance [Well Groomed] : well groomed [General Appearance - Well Nourished] : well nourished [No Deformities] : no deformities [General Appearance - In No Acute Distress] : no acute distress [Normal Conjunctiva] : the conjunctiva exhibited no abnormalities [Eyelids - No Xanthelasma] : the eyelids demonstrated no xanthelasmas [Normal Oral Mucosa] : normal oral mucosa [No Oral Pallor] : no oral pallor [No Oral Cyanosis] : no oral cyanosis [Normal Jugular Venous A Waves Present] : normal jugular venous A waves present [Normal Jugular Venous V Waves Present] : normal jugular venous V waves present [No Jugular Venous Hong A Waves] : no jugular venous hong A waves [Heart Rate And Rhythm] : heart rate and rhythm were normal [Heart Sounds] : normal S1 and S2 [Murmurs] : no murmurs present [Edema] : no peripheral edema present [] : no respiratory distress [Respiration, Rhythm And Depth] : normal respiratory rhythm and effort [Exaggerated Use Of Accessory Muscles For Inspiration] : no accessory muscle use [Auscultation Breath Sounds / Voice Sounds] : lungs were clear to auscultation bilaterally [Bowel Sounds] : normal bowel sounds [Abdomen Soft] : soft [Abdomen Tenderness] : non-tender [Abnormal Walk] : normal gait [Nail Clubbing] : no clubbing of the fingernails [Skin Color & Pigmentation] : normal skin color and pigmentation [Oriented To Time, Place, And Person] : oriented to person, place, and time [FreeTextEntry1] : mild Right pedal edema

## 2021-03-11 NOTE — HISTORY OF PRESENT ILLNESS
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 She was discharged with 2 days of IV fluids and 28 day course of Vancomycin. She did report initial right Rachel swelling which has resolved. She currently denies any PND or orthopnea. She has a chronic cough which is attributed to post nasal drip. \par \par Overall is not very active. Baseline running errands twice a week which would included 10-15 minute walking. She felt difference since 02/2021. \par \par She has preformed at a [facility] about 2 years ago with reported no abnormalities. \par she had albs last week with dr mcnamara and told of anemia(hb about 9)

## 2021-03-16 ENCOUNTER — NON-APPOINTMENT (OUTPATIENT)
Age: 56
End: 2021-03-16

## 2021-03-16 ENCOUNTER — APPOINTMENT (OUTPATIENT)
Dept: HEART AND VASCULAR | Facility: CLINIC | Age: 56
End: 2021-03-16
Payer: COMMERCIAL

## 2021-03-16 VITALS
SYSTOLIC BLOOD PRESSURE: 149 MMHG | HEART RATE: 62 BPM | WEIGHT: 260 LBS | HEIGHT: 65 IN | DIASTOLIC BLOOD PRESSURE: 72 MMHG | BODY MASS INDEX: 43.32 KG/M2

## 2021-03-16 PROCEDURE — 93000 ELECTROCARDIOGRAM COMPLETE: CPT

## 2021-03-16 PROCEDURE — 99072 ADDL SUPL MATRL&STAF TM PHE: CPT

## 2021-03-16 PROCEDURE — 99213 OFFICE O/P EST LOW 20 MIN: CPT | Mod: 25

## 2021-03-16 RX ORDER — DRONEDARONE 400 MG/1
400 TABLET, FILM COATED ORAL TWICE DAILY
Qty: 180 | Refills: 1 | Status: DISCONTINUED | COMMUNITY
End: 2021-03-16

## 2021-03-18 NOTE — PHYSICAL EXAM
[General Appearance - Well Developed] : well developed [Normal Appearance] : normal appearance [Well Groomed] : well groomed [General Appearance - Well Nourished] : well nourished [No Deformities] : no deformities [General Appearance - In No Acute Distress] : no acute distress [Normal Conjunctiva] : the conjunctiva exhibited no abnormalities [Eyelids - No Xanthelasma] : the eyelids demonstrated no xanthelasmas [Normal Oral Mucosa] : normal oral mucosa [No Oral Pallor] : no oral pallor [No Oral Cyanosis] : no oral cyanosis [Normal Jugular Venous A Waves Present] : normal jugular venous A waves present [Normal Jugular Venous V Waves Present] : normal jugular venous V waves present [No Jugular Venous Hong A Waves] : no jugular venous hong A waves [Abdomen Soft] : soft [Abdomen Tenderness] : non-tender [Abdomen Mass (___ Cm)] : no abdominal mass palpated [Abnormal Walk] : normal gait [Gait - Sufficient For Exercise Testing] : the gait was sufficient for exercise testing [Nail Clubbing] : no clubbing of the fingernails [Cyanosis, Localized] : no localized cyanosis [Petechial Hemorrhages (___cm)] : no petechial hemorrhages [Skin Color & Pigmentation] : normal skin color and pigmentation [] : no rash [No Venous Stasis] : no venous stasis [Skin Lesions] : no skin lesions [No Skin Ulcers] : no skin ulcer [No Xanthoma] : no  xanthoma was observed [Oriented To Time, Place, And Person] : oriented to person, place, and time [Affect] : the affect was normal [Mood] : the mood was normal [No Anxiety] : not feeling anxious [5th Left ICS - MCL] : palpated at the 5th LICS in the midclavicular line [Normal] : normal [Tachycardia] : tachycardic [Regularly Irregular] : regularly irregular [II] : a grade 2 [No Pitting Edema] : no pitting edema present

## 2021-03-18 NOTE — HISTORY OF PRESENT ILLNESS
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 Conner.  She had recurrent atypical atrial flutter now s/p DCCV 12/2020.  She reports DCCV lasted for a matter of hours and she was [place] [facility] [place]   She was started on Multaq 400 mg BID and is s/p ablation of atrial fibrillation and atypical atrial flutter on 2/17/21.  She had a non clinical atrial tachycardia at the end of the case and was cardioverted to SR.  ACE was not started 2/2 hypotension.  EF 40% by ECHO.  Interval history significant for COVID 19 and CDiff infection.  She feels very fatigued and notes longstanding LE edema.  No palpitations.  She has been on Xarelto uninterrupted and denies any bleeding issues.\par

## 2021-04-15 ENCOUNTER — NON-APPOINTMENT (OUTPATIENT)
Age: 56
End: 2021-04-15

## 2021-04-15 ENCOUNTER — APPOINTMENT (OUTPATIENT)
Dept: INFECTIOUS DISEASE | Facility: CLINIC | Age: 56
End: 2021-04-15
Payer: COMMERCIAL

## 2021-04-15 ENCOUNTER — LABORATORY RESULT (OUTPATIENT)
Age: 56
End: 2021-04-15

## 2021-04-15 VITALS
BODY MASS INDEX: 41.55 KG/M2 | WEIGHT: 249.38 LBS | OXYGEN SATURATION: 98 % | HEART RATE: 75 BPM | TEMPERATURE: 98 F | DIASTOLIC BLOOD PRESSURE: 85 MMHG | HEIGHT: 65 IN | SYSTOLIC BLOOD PRESSURE: 134 MMHG

## 2021-04-15 DIAGNOSIS — R50.9 FEVER, UNSPECIFIED: ICD-10-CM

## 2021-04-15 PROCEDURE — 99072 ADDL SUPL MATRL&STAF TM PHE: CPT

## 2021-04-15 PROCEDURE — 99205 OFFICE O/P NEW HI 60 MIN: CPT

## 2021-04-16 ENCOUNTER — NON-APPOINTMENT (OUTPATIENT)
Age: 56
End: 2021-04-16

## 2021-04-16 PROBLEM — R50.9 FEVER: Status: ACTIVE | Noted: 2021-03-11

## 2021-04-16 LAB
ALBUMIN SERPL ELPH-MCNC: 4 G/DL
ALP BLD-CCNC: 123 U/L
ALT SERPL-CCNC: 9 U/L
ANION GAP SERPL CALC-SCNC: 14 MMOL/L
AST SERPL-CCNC: 22 U/L
BASOPHILS # BLD AUTO: 1.34 K/UL
BASOPHILS NFR BLD AUTO: 4.4 %
BILIRUB SERPL-MCNC: 0.3 MG/DL
BUN SERPL-MCNC: 15 MG/DL
CALCIUM SERPL-MCNC: 9.6 MG/DL
CHLORIDE SERPL-SCNC: 98 MMOL/L
CO2 SERPL-SCNC: 21 MMOL/L
COVID-19 NUCLEOCAPSID  GAM ANTIBODY INTERPRETATION: POSITIVE
COVID-19 SPIKE DOMAIN ANTIBODY INTERPRETATION: POSITIVE
CREAT SERPL-MCNC: 0.78 MG/DL
CRP SERPL-MCNC: 82 MG/L
EOSINOPHIL # BLD AUTO: 1.07 K/UL
EOSINOPHIL NFR BLD AUTO: 3.5 %
ERYTHROCYTE [SEDIMENTATION RATE] IN BLOOD BY WESTERGREN METHOD: 110 MM/HR
GLUCOSE SERPL-MCNC: 104 MG/DL
HCT VFR BLD CALC: 34.7 %
HGB BLD-MCNC: 10.4 G/DL
LYMPHOCYTES # BLD AUTO: 2.38 K/UL
LYMPHOCYTES NFR BLD AUTO: 7.8 %
MAN DIFF?: NORMAL
MCHC RBC-ENTMCNC: 28.4 PG
MCHC RBC-ENTMCNC: 30 GM/DL
MCV RBC AUTO: 94.8 FL
MONOCYTES # BLD AUTO: 0.79 K/UL
MONOCYTES NFR BLD AUTO: 2.6 %
NEUTROPHILS # BLD AUTO: 22.03 K/UL
NEUTROPHILS NFR BLD AUTO: 70.4 %
PLATELET # BLD AUTO: 802 K/UL
POTASSIUM SERPL-SCNC: 4.6 MMOL/L
PROT SERPL-MCNC: 7.6 G/DL
RBC # BLD: 3.66 M/UL
RBC # FLD: 15.7 %
SARS-COV-2 AB SERPL IA-ACNC: >250 U/ML
SARS-COV-2 AB SERPL QL IA: 89 INDEX
SARS-COV-2 N GENE NPH QL NAA+PROBE: NOT DETECTED
SODIUM SERPL-SCNC: 133 MMOL/L
WBC # FLD AUTO: 30.55 K/UL

## 2021-04-16 NOTE — PHYSICAL EXAM
[General Appearance - Alert] : alert [General Appearance - In No Acute Distress] : in no acute distress [Sclera] : the sclera and conjunctiva were normal [Outer Ear] : the ears and nose were normal in appearance [Neck Appearance] : the appearance of the neck was normal [Respiration, Rhythm And Depth] : normal respiratory rhythm and effort [Auscultation Breath Sounds / Voice Sounds] : lungs were clear to auscultation bilaterally [Heart Rate And Rhythm] : heart rate was normal and rhythm regular [Heart Sounds] : normal S1 and S2 [Bowel Sounds] : normal bowel sounds [Abdomen Soft] : soft [FreeTextEntry1] : ttp at mid abdomen [Cervical Lymph Nodes Enlarged Posterior Bilaterally] : posterior cervical [Cervical Lymph Nodes Enlarged Anterior Bilaterally] : anterior cervical [Supraclavicular Lymph Nodes Enlarged Bilaterally] : supraclavicular [] : no rash [No Focal Deficits] : no focal deficits [Oriented To Time, Place, And Person] : oriented to person, place, and time [Affect] : the affect was normal

## 2021-04-16 NOTE — DATA REVIEWED
[FreeTextEntry1] : 2/17 WBC 22.6\par \par 2/18 TTE\par CONCLUSIONS:\par  1. Dilated left ventriclular size.\par  2. Mildly reduced left ventricular systolic function.\par  3. Biatrial enlargement.\par  4. Mild-to-moderate tricuspid regurgitation.\par  5. Bioprosthetic valve is seen in the aortic position with apparent normal function, without evidence of prosthetic dysfunction.\par  6. Pulmonary artery systolic pressure is 39 mmHg.\par  7. No pericardial effusion.

## 2021-04-16 NOTE — ASSESSMENT
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 \par \par - CBC, CMP, ESR, CRP\par - BCx 2 sets \par - need ADALGISA, will ask Dr. Nava to arrange\par - COVID ab (spike, nucleocupsis), PCR \par - tick born disease panel \par \par

## 2021-04-21 ENCOUNTER — NON-APPOINTMENT (OUTPATIENT)
Age: 56
End: 2021-04-21

## 2021-04-21 LAB
A PHAGOCYTOPH IGG TITR SER IF: NORMAL TITER
B BURGDOR AB SER QL IA: NEGATIVE
B MICROTI IGG TITR SER: NORMAL TITER
BACTERIA BLD CULT: NORMAL
BACTERIA BLD CULT: NORMAL
E CHAFFEENSIS IGG TITR SER IF: NORMAL TITER

## 2021-04-25 ENCOUNTER — APPOINTMENT (OUTPATIENT)
Dept: CT IMAGING | Facility: HOSPITAL | Age: 56
End: 2021-04-25
Payer: COMMERCIAL

## 2021-04-25 ENCOUNTER — RESULT REVIEW (OUTPATIENT)
Age: 56
End: 2021-04-25

## 2021-04-25 ENCOUNTER — OUTPATIENT (OUTPATIENT)
Dept: OUTPATIENT SERVICES | Facility: HOSPITAL | Age: 56
LOS: 1 days | End: 2021-04-25
Payer: COMMERCIAL

## 2021-04-25 DIAGNOSIS — Z98.890 OTHER SPECIFIED POSTPROCEDURAL STATES: Chronic | ICD-10-CM

## 2021-04-25 DIAGNOSIS — Z95.2 PRESENCE OF PROSTHETIC HEART VALVE: Chronic | ICD-10-CM

## 2021-04-25 PROCEDURE — 71260 CT THORAX DX C+: CPT

## 2021-04-25 PROCEDURE — 71260 CT THORAX DX C+: CPT | Mod: 26

## 2021-04-25 PROCEDURE — 74177 CT ABD & PELVIS W/CONTRAST: CPT

## 2021-04-25 PROCEDURE — 74177 CT ABD & PELVIS W/CONTRAST: CPT | Mod: 26

## 2021-04-26 ENCOUNTER — FORM ENCOUNTER (OUTPATIENT)
Age: 56
End: 2021-04-26

## 2021-04-26 DIAGNOSIS — D72.829 ELEVATED WHITE BLOOD CELL COUNT, UNSPECIFIED: ICD-10-CM

## 2021-04-27 ENCOUNTER — OUTPATIENT (OUTPATIENT)
Dept: OUTPATIENT SERVICES | Facility: HOSPITAL | Age: 56
LOS: 1 days | End: 2021-04-27
Payer: COMMERCIAL

## 2021-04-27 DIAGNOSIS — I35.1 NONRHEUMATIC AORTIC (VALVE) INSUFFICIENCY: ICD-10-CM

## 2021-04-27 DIAGNOSIS — Z95.2 PRESENCE OF PROSTHETIC HEART VALVE: Chronic | ICD-10-CM

## 2021-04-27 DIAGNOSIS — Z98.890 OTHER SPECIFIED POSTPROCEDURAL STATES: Chronic | ICD-10-CM

## 2021-04-27 PROCEDURE — 93312 ECHO TRANSESOPHAGEAL: CPT

## 2021-04-27 PROCEDURE — 93312 ECHO TRANSESOPHAGEAL: CPT | Mod: 26

## 2021-04-29 ENCOUNTER — NON-APPOINTMENT (OUTPATIENT)
Age: 56
End: 2021-04-29

## 2021-04-29 ENCOUNTER — APPOINTMENT (OUTPATIENT)
Dept: HEART AND VASCULAR | Facility: CLINIC | Age: 56
End: 2021-04-29

## 2021-04-30 ENCOUNTER — NON-APPOINTMENT (OUTPATIENT)
Age: 56
End: 2021-04-30

## 2021-05-04 ENCOUNTER — APPOINTMENT (OUTPATIENT)
Dept: HEMATOLOGY ONCOLOGY | Facility: CLINIC | Age: 56
End: 2021-05-04

## 2021-05-05 ENCOUNTER — TRANSCRIPTION ENCOUNTER (OUTPATIENT)
Age: 56
End: 2021-05-05

## 2021-05-05 ENCOUNTER — APPOINTMENT (OUTPATIENT)
Dept: HEMATOLOGY ONCOLOGY | Facility: CLINIC | Age: 56
End: 2021-05-05
Payer: COMMERCIAL

## 2021-05-05 ENCOUNTER — NON-APPOINTMENT (OUTPATIENT)
Age: 56
End: 2021-05-05

## 2021-05-05 VITALS
SYSTOLIC BLOOD PRESSURE: 146 MMHG | OXYGEN SATURATION: 98 % | WEIGHT: 248 LBS | HEIGHT: 65 IN | RESPIRATION RATE: 15 BRPM | BODY MASS INDEX: 41.32 KG/M2 | HEART RATE: 60 BPM | DIASTOLIC BLOOD PRESSURE: 84 MMHG | TEMPERATURE: 97.9 F

## 2021-05-05 LAB
LAP WBC-ACNC: 8
T(9;22)(ABL1,BCR)/CONTROL BLD/T: NORMAL

## 2021-05-05 PROCEDURE — 99204 OFFICE O/P NEW MOD 45 MIN: CPT

## 2021-05-05 PROCEDURE — 99072 ADDL SUPL MATRL&STAF TM PHE: CPT

## 2021-05-05 NOTE — HISTORY OF PRESENT ILLNESS
[de-identified] : 55 is old white female, known to have leukocytosis 2 months ago... Infectious work-up was negative, patient was found to have BCR ABL positive, consistent with CML... She is here today with her , to discuss disease, prognosis and treatment... She was given a copy of her CBC, and BCR results... She was invited to create her own portal, so she can review all her blood results...

## 2021-05-12 ENCOUNTER — TRANSCRIPTION ENCOUNTER (OUTPATIENT)
Age: 56
End: 2021-05-12

## 2021-05-13 ENCOUNTER — APPOINTMENT (OUTPATIENT)
Dept: HEART AND VASCULAR | Facility: CLINIC | Age: 56
End: 2021-05-13

## 2021-05-13 ENCOUNTER — TRANSCRIPTION ENCOUNTER (OUTPATIENT)
Age: 56
End: 2021-05-13

## 2021-05-24 ENCOUNTER — TRANSCRIPTION ENCOUNTER (OUTPATIENT)
Age: 56
End: 2021-05-24

## 2021-05-27 ENCOUNTER — TRANSCRIPTION ENCOUNTER (OUTPATIENT)
Age: 56
End: 2021-05-27

## 2021-06-08 ENCOUNTER — APPOINTMENT (OUTPATIENT)
Dept: HEART AND VASCULAR | Facility: CLINIC | Age: 56
End: 2021-06-08
Payer: COMMERCIAL

## 2021-06-08 ENCOUNTER — NON-APPOINTMENT (OUTPATIENT)
Age: 56
End: 2021-06-08

## 2021-06-08 VITALS
WEIGHT: 255 LBS | SYSTOLIC BLOOD PRESSURE: 126 MMHG | HEIGHT: 65 IN | HEART RATE: 68 BPM | DIASTOLIC BLOOD PRESSURE: 66 MMHG | BODY MASS INDEX: 42.49 KG/M2 | TEMPERATURE: 98 F

## 2021-06-08 PROCEDURE — 99072 ADDL SUPL MATRL&STAF TM PHE: CPT

## 2021-06-08 PROCEDURE — 93000 ELECTROCARDIOGRAM COMPLETE: CPT

## 2021-06-08 PROCEDURE — 99214 OFFICE O/P EST MOD 30 MIN: CPT | Mod: 25

## 2021-06-08 RX ORDER — IMATINIB MESYLATE 100 MG/1
100 TABLET, FILM COATED ORAL DAILY
Qty: 90 | Refills: 0 | Status: ACTIVE | COMMUNITY
Start: 2021-05-05

## 2021-06-08 NOTE — PHYSICAL EXAM
[Well Developed] : well developed [Well Nourished] : well nourished [No Acute Distress] : no acute distress [Normal Conjunctiva] : normal conjunctiva [Normal Venous Pressure] : normal venous pressure [No Carotid Bruit] : no carotid bruit [Normal S1, S2] : normal S1, S2 [No Rub] : no rub [No Gallop] : no gallop [Clear Lung Fields] : clear lung fields [Good Air Entry] : good air entry [No Respiratory Distress] : no respiratory distress  [Soft] : abdomen soft [Non Tender] : non-tender [Normal Bowel Sounds] : normal bowel sounds [Normal Gait] : normal gait [No Cyanosis] : no cyanosis [No Clubbing] : no clubbing [No Rash] : no rash [Moves all extremities] : moves all extremities [No Focal Deficits] : no focal deficits [Normal Speech] : normal speech [Alert and Oriented] : alert and oriented [Normal memory] : normal memory [de-identified] : O/W [de-identified] : soft systolic murmur [de-identified] : trace-1+ bipedal edema [de-identified] : ecchymosis under left orbit

## 2021-06-08 NOTE — DISCUSSION/SUMMARY
[FreeTextEntry1] : EKG:NSR\par had labs yesterday with Dr Payne(hematologist/oncologist at [facility])- told of anemia\par cont Xarelto for af -if dr Payne feels that needs lower dosage of DOAC because of Gleevec she was advised to let me know\par reviewed ADALGISA- reduced EF- would continue toprol and start entresto for CM \par RV  3 weeks

## 2021-06-08 NOTE — REVIEW OF SYSTEMS
[Weight Gain (___ Lbs)] : [unfilled] ~Ulb weight gain [Feeling Fatigued] : feeling fatigued [Blurry Vision] : blurred vision [Lower Ext Edema] : lower extremity edema [Cough] : cough [Joint Pain] : joint pain [Knee Problem] : knee problems [Knee Pain] : knee pain [Easy Bruising] : a tendency for easy bruising [Negative] : Psychiatric [Headache] : no headache [Leg Claudication] : no intermittent leg claudication [Syncope] : no syncope

## 2021-06-14 ENCOUNTER — APPOINTMENT (OUTPATIENT)
Dept: HEMATOLOGY ONCOLOGY | Facility: CLINIC | Age: 56
End: 2021-06-14

## 2021-06-29 ENCOUNTER — NON-APPOINTMENT (OUTPATIENT)
Age: 56
End: 2021-06-29

## 2021-06-29 ENCOUNTER — APPOINTMENT (OUTPATIENT)
Dept: HEART AND VASCULAR | Facility: CLINIC | Age: 56
End: 2021-06-29
Payer: COMMERCIAL

## 2021-06-29 VITALS
HEIGHT: 65 IN | BODY MASS INDEX: 40.82 KG/M2 | WEIGHT: 245 LBS | TEMPERATURE: 98.6 F | DIASTOLIC BLOOD PRESSURE: 60 MMHG | HEART RATE: 67 BPM | SYSTOLIC BLOOD PRESSURE: 100 MMHG

## 2021-06-29 PROCEDURE — 99072 ADDL SUPL MATRL&STAF TM PHE: CPT

## 2021-06-29 PROCEDURE — 93000 ELECTROCARDIOGRAM COMPLETE: CPT

## 2021-06-29 PROCEDURE — 99213 OFFICE O/P EST LOW 20 MIN: CPT | Mod: 25

## 2021-06-29 RX ORDER — SACUBITRIL AND VALSARTAN 24; 26 MG/1; MG/1
24-26 TABLET, FILM COATED ORAL
Refills: 0 | Status: DISCONTINUED | COMMUNITY
End: 2021-06-29

## 2021-06-29 NOTE — DISCUSSION/SUMMARY
[FreeTextEntry1] : EKG:NSR\par cont Xarelto for PAF; Toprol + entresto for CM. meds renewed\par feel fatigue due to anemia/chemo (hb last week 9.1g)- but will try reducing Toprol and pt will call if she's improved

## 2021-06-29 NOTE — PHYSICAL EXAM
[Well Developed] : well developed [Well Nourished] : well nourished [No Acute Distress] : no acute distress [Normal Conjunctiva] : normal conjunctiva [Normal Venous Pressure] : normal venous pressure [No Carotid Bruit] : no carotid bruit [Normal S1, S2] : normal S1, S2 [Clear Lung Fields] : clear lung fields [Good Air Entry] : good air entry [No Respiratory Distress] : no respiratory distress  [Soft] : abdomen soft [Non Tender] : non-tender [Normal Gait] : normal gait [No Edema] : no edema [No Varicosities] : no varicosities [No Rash] : no rash [Moves all extremities] : moves all extremities [Normal Speech] : normal speech [Alert and Oriented] : alert and oriented [Normal memory] : normal memory [de-identified] : O/W

## 2021-07-13 DIAGNOSIS — C92.10 CHRONIC MYELOID LEUKEMIA, BCR/ABL-POSITIVE, NOT HAVING ACHIEVED REMISSION: ICD-10-CM

## 2021-09-19 ENCOUNTER — EMERGENCY (EMERGENCY)
Facility: HOSPITAL | Age: 56
LOS: 1 days | Discharge: HOME | End: 2021-09-19
Attending: EMERGENCY MEDICINE | Admitting: EMERGENCY MEDICINE
Payer: COMMERCIAL

## 2021-09-19 VITALS
HEIGHT: 65 IN | WEIGHT: 240.08 LBS | DIASTOLIC BLOOD PRESSURE: 65 MMHG | TEMPERATURE: 98 F | SYSTOLIC BLOOD PRESSURE: 129 MMHG | RESPIRATION RATE: 18 BRPM | OXYGEN SATURATION: 99 % | HEART RATE: 79 BPM

## 2021-09-19 DIAGNOSIS — I10 ESSENTIAL (PRIMARY) HYPERTENSION: ICD-10-CM

## 2021-09-19 DIAGNOSIS — R07.89 OTHER CHEST PAIN: ICD-10-CM

## 2021-09-19 DIAGNOSIS — Z98.890 OTHER SPECIFIED POSTPROCEDURAL STATES: Chronic | ICD-10-CM

## 2021-09-19 DIAGNOSIS — M54.2 CERVICALGIA: ICD-10-CM

## 2021-09-19 DIAGNOSIS — Z88.0 ALLERGY STATUS TO PENICILLIN: ICD-10-CM

## 2021-09-19 DIAGNOSIS — Z85.6 PERSONAL HISTORY OF LEUKEMIA: ICD-10-CM

## 2021-09-19 DIAGNOSIS — M19.011 PRIMARY OSTEOARTHRITIS, RIGHT SHOULDER: ICD-10-CM

## 2021-09-19 DIAGNOSIS — Z95.2 PRESENCE OF PROSTHETIC HEART VALVE: Chronic | ICD-10-CM

## 2021-09-19 DIAGNOSIS — M25.511 PAIN IN RIGHT SHOULDER: ICD-10-CM

## 2021-09-19 DIAGNOSIS — Z20.822 CONTACT WITH AND (SUSPECTED) EXPOSURE TO COVID-19: ICD-10-CM

## 2021-09-19 DIAGNOSIS — Z95.2 PRESENCE OF PROSTHETIC HEART VALVE: ICD-10-CM

## 2021-09-19 DIAGNOSIS — Z79.01 LONG TERM (CURRENT) USE OF ANTICOAGULANTS: ICD-10-CM

## 2021-09-19 LAB
ALBUMIN SERPL ELPH-MCNC: 4.1 G/DL — SIGNIFICANT CHANGE UP (ref 3.3–5)
ALP SERPL-CCNC: 118 U/L — SIGNIFICANT CHANGE UP (ref 40–120)
ALT FLD-CCNC: 12 U/L — SIGNIFICANT CHANGE UP (ref 10–45)
ANION GAP SERPL CALC-SCNC: 11 MMOL/L — SIGNIFICANT CHANGE UP (ref 5–17)
APTT BLD: 50.1 SEC — HIGH (ref 27.5–35.5)
AST SERPL-CCNC: 20 U/L — SIGNIFICANT CHANGE UP (ref 10–40)
BASOPHILS # BLD AUTO: 0.02 K/UL — SIGNIFICANT CHANGE UP (ref 0–0.2)
BASOPHILS NFR BLD AUTO: 0.3 % — SIGNIFICANT CHANGE UP (ref 0–2)
BILIRUB SERPL-MCNC: 0.3 MG/DL — SIGNIFICANT CHANGE UP (ref 0.2–1.2)
BUN SERPL-MCNC: 19 MG/DL — SIGNIFICANT CHANGE UP (ref 7–23)
CALCIUM SERPL-MCNC: 9.5 MG/DL — SIGNIFICANT CHANGE UP (ref 8.4–10.5)
CHLORIDE SERPL-SCNC: 103 MMOL/L — SIGNIFICANT CHANGE UP (ref 96–108)
CO2 SERPL-SCNC: 24 MMOL/L — SIGNIFICANT CHANGE UP (ref 22–31)
CREAT SERPL-MCNC: 0.93 MG/DL — SIGNIFICANT CHANGE UP (ref 0.5–1.3)
EOSINOPHIL # BLD AUTO: 0.1 K/UL — SIGNIFICANT CHANGE UP (ref 0–0.5)
EOSINOPHIL NFR BLD AUTO: 1.5 % — SIGNIFICANT CHANGE UP (ref 0–6)
GLUCOSE SERPL-MCNC: 146 MG/DL — HIGH (ref 70–99)
HCT VFR BLD CALC: 28.8 % — LOW (ref 34.5–45)
HGB BLD-MCNC: 9.3 G/DL — LOW (ref 11.5–15.5)
IMM GRANULOCYTES NFR BLD AUTO: 0.3 % — SIGNIFICANT CHANGE UP (ref 0–1.5)
INR BLD: 3.69 — HIGH (ref 0.88–1.16)
LYMPHOCYTES # BLD AUTO: 1.2 K/UL — SIGNIFICANT CHANGE UP (ref 1–3.3)
LYMPHOCYTES # BLD AUTO: 17.5 % — SIGNIFICANT CHANGE UP (ref 13–44)
MCHC RBC-ENTMCNC: 32.3 GM/DL — SIGNIFICANT CHANGE UP (ref 32–36)
MCHC RBC-ENTMCNC: 32.5 PG — SIGNIFICANT CHANGE UP (ref 27–34)
MCV RBC AUTO: 100.7 FL — HIGH (ref 80–100)
MONOCYTES # BLD AUTO: 0.54 K/UL — SIGNIFICANT CHANGE UP (ref 0–0.9)
MONOCYTES NFR BLD AUTO: 7.9 % — SIGNIFICANT CHANGE UP (ref 2–14)
NEUTROPHILS # BLD AUTO: 4.99 K/UL — SIGNIFICANT CHANGE UP (ref 1.8–7.4)
NEUTROPHILS NFR BLD AUTO: 72.5 % — SIGNIFICANT CHANGE UP (ref 43–77)
NRBC # BLD: 0 /100 WBCS — SIGNIFICANT CHANGE UP (ref 0–0)
PLATELET # BLD AUTO: 238 K/UL — SIGNIFICANT CHANGE UP (ref 150–400)
POTASSIUM SERPL-MCNC: 4.3 MMOL/L — SIGNIFICANT CHANGE UP (ref 3.5–5.3)
POTASSIUM SERPL-SCNC: 4.3 MMOL/L — SIGNIFICANT CHANGE UP (ref 3.5–5.3)
PROT SERPL-MCNC: 7.6 G/DL — SIGNIFICANT CHANGE UP (ref 6–8.3)
PROTHROM AB SERPL-ACNC: 41.6 SEC — HIGH (ref 10.6–13.6)
RBC # BLD: 2.86 M/UL — LOW (ref 3.8–5.2)
RBC # FLD: 17.4 % — HIGH (ref 10.3–14.5)
SARS-COV-2 RNA SPEC QL NAA+PROBE: SIGNIFICANT CHANGE UP
SODIUM SERPL-SCNC: 138 MMOL/L — SIGNIFICANT CHANGE UP (ref 135–145)
TROPONIN T SERPL-MCNC: <0.01 NG/ML — SIGNIFICANT CHANGE UP (ref 0–0.01)
WBC # BLD: 6.87 K/UL — SIGNIFICANT CHANGE UP (ref 3.8–10.5)
WBC # FLD AUTO: 6.87 K/UL — SIGNIFICANT CHANGE UP (ref 3.8–10.5)

## 2021-09-19 PROCEDURE — 73030 X-RAY EXAM OF SHOULDER: CPT

## 2021-09-19 PROCEDURE — 85025 COMPLETE CBC W/AUTO DIFF WBC: CPT

## 2021-09-19 PROCEDURE — 99285 EMERGENCY DEPT VISIT HI MDM: CPT

## 2021-09-19 PROCEDURE — 36415 COLL VENOUS BLD VENIPUNCTURE: CPT

## 2021-09-19 PROCEDURE — 73030 X-RAY EXAM OF SHOULDER: CPT | Mod: 26,RT

## 2021-09-19 PROCEDURE — 87635 SARS-COV-2 COVID-19 AMP PRB: CPT

## 2021-09-19 PROCEDURE — 72125 CT NECK SPINE W/O DYE: CPT | Mod: 26,MG

## 2021-09-19 PROCEDURE — 84484 ASSAY OF TROPONIN QUANT: CPT

## 2021-09-19 PROCEDURE — 80053 COMPREHEN METABOLIC PANEL: CPT

## 2021-09-19 PROCEDURE — G1004: CPT

## 2021-09-19 PROCEDURE — 85730 THROMBOPLASTIN TIME PARTIAL: CPT

## 2021-09-19 PROCEDURE — 93005 ELECTROCARDIOGRAM TRACING: CPT

## 2021-09-19 PROCEDURE — 71045 X-RAY EXAM CHEST 1 VIEW: CPT | Mod: 26

## 2021-09-19 PROCEDURE — 85610 PROTHROMBIN TIME: CPT

## 2021-09-19 PROCEDURE — 71045 X-RAY EXAM CHEST 1 VIEW: CPT

## 2021-09-19 PROCEDURE — 72125 CT NECK SPINE W/O DYE: CPT | Mod: MG

## 2021-09-19 PROCEDURE — 96374 THER/PROPH/DIAG INJ IV PUSH: CPT

## 2021-09-19 PROCEDURE — 99284 EMERGENCY DEPT VISIT MOD MDM: CPT | Mod: 25

## 2021-09-19 RX ORDER — KETOROLAC TROMETHAMINE 30 MG/ML
15 SYRINGE (ML) INJECTION ONCE
Refills: 0 | Status: DISCONTINUED | OUTPATIENT
Start: 2021-09-19 | End: 2021-09-19

## 2021-09-19 RX ADMIN — Medication 15 MILLIGRAM(S): at 05:31

## 2021-09-19 NOTE — ED PROVIDER NOTE - NSFOLLOWUPINSTRUCTIONS_ED_ALL_ED_FT
Shoulder Pain    WHAT YOU NEED TO KNOW:    Shoulder pain is a common problem that can affect your daily activities. Pain can be caused by a problem within your shoulder, such as soreness of a tendon or bursa. A tendon is a cord of tough tissue that connects your muscles to your bones. The bursa is a fluid-filled sac that acts as a cushion between a bone and a tendon. Shoulder pain may also be caused by pain that spreads to your shoulder from another part of your body.    Shoulder Anatomy         DISCHARGE INSTRUCTIONS:    Return to the emergency department if:   •You have severe pain.      •You cannot move your arm or shoulder.      •You have numbness or tingling in your shoulder or arm.      Contact your healthcare provider if:   •Your pain gets worse or does not go away with treatment.      •You have trouble moving your arm or shoulder.      •You have questions or concerns about your condition or care.      Medicines: You may need any of the following:   •Acetaminophen decreases pain and fever. It is available without a doctor's order. Ask how much to take and how often to take it. Follow directions. Read the labels of all other medicines you are using to see if they also contain acetaminophen, or ask your doctor or pharmacist. Acetaminophen can cause liver damage if not taken correctly. Do not use more than 4 grams (4,000 milligrams) total of acetaminophen in one day.       •NSAIDs, such as ibuprofen, help decrease swelling, pain, and fever. This medicine is available with or without a doctor's order. NSAIDs can cause stomach bleeding or kidney problems in certain people. If you take blood thinner medicine, always ask your healthcare provider if NSAIDs are safe for you. Always read the medicine label and follow directions.      •Take your medicine as directed. Contact your healthcare provider if you think your medicine is not helping or if you have side effects. Tell him of her if you are allergic to any medicine. Keep a list of the medicines, vitamins, and herbs you take. Include the amounts, and when and why you take them. Bring the list or the pill bottles to follow-up visits. Carry your medicine list with you in case of an emergency.      Manage your symptoms:   •Apply ice on your shoulder for 20 to 30 minutes every 2 hours or as directed. Use an ice pack, or put crushed ice in a plastic bag. Cover it with a towel before you apply it to your shoulder. Ice helps prevent tissue damage and decreases swelling and pain.      •Apply heat if ice does not help your symptoms. Apply heat on your shoulder for 20 to 30 minutes every 2 hours for as many days as directed. Heat helps decrease pain and muscle spasms.      •Limit activities as directed. Try to avoid repeated overhead movements.      •Go to physical or occupational therapy as directed. A physical therapist teaches you exercises to help improve movement and strength, and to decrease pain. An occupational therapist teaches you skills to help with your daily activities.       Prevent shoulder pain:   •Maintain a good range of motion in your shoulder. Ask your healthcare provider which exercises you should do on a regular basis after you have healed.       •Stretch and strengthen your shoulder. Use proper technique during exercises and sports.      Follow up with your healthcare provider or orthopedist as directed: Write down your questions so you remember to ask them during your visits.

## 2021-09-19 NOTE — ED PROVIDER NOTE - CLINICAL SUMMARY MEDICAL DECISION MAKING FREE TEXT BOX
Onset: Shortly before calling    Location / description: Patient is having anxiety about not being able to move his hand. He is having cold sweats due to this. Patient states he can't move his fingers on his left hand. They are not swollen and the cast is not tight. 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  Patient advised to elevate arm, ice arm. Practice relaxation breathing. Breathe in through the nose and out the mouth. Keep his pain level below 5. Call back with any further questions or concerns.    Precipitating Factors: surgery    Pain Scale (1 - 10), 10 highest: denies    Associated Symptoms: as above    What  improves / worsens symptoms: elevation    Symptom specific medications: see medication list    Recent Care: 01/07/2019    Reason for Disposition  • Other post-op symptom or question (all triage questions negative)    Protocols used: POST-OP SYMPTOMS AND [id number]       57 y/o f hx HTN, CLL, aortic valve replacement on xarelto presents c/o right shoulder pain radiating to right arm for the past day; vss in ED, ext NVI, no neuro deficits.  Labs wnl, trop neg.  Xray right shoulder shows OA, pt given toradol for pain, will get CT cervical spine as her pain could be radicular.  Plan to f/u CT, likely can d/c with outpatient ortho f/u.

## 2021-09-19 NOTE — ED ADULT NURSE NOTE - NSIMPLEMENTINTERV_GEN_ALL_ED
Implemented All Universal Safety Interventions:  [place] to call system. Call bell, personal items and telephone within reach. Instruct patient to call for assistance. Room bathroom lighting operational. Non-slip footwear when patient is off stretcher. Physically safe environment: no spills, clutter or unnecessary equipment. Stretcher in lowest position, wheels locked, appropriate side rails in place.

## 2021-09-19 NOTE — ED PROVIDER NOTE - OBJECTIVE STATEMENT
57 y/o f hx HTN, CLL, aortic valve replacement on xarelto presents c/o right shoulder pain which radiates down her right arm for the past day.  Pt stating she has had mild neck pain for the past several months, thinks that is unchanged today although pain does run across right upper back as well.  Pt reports "pins and needles" feeling in right arm as well.  Pt states she has no chest pain (contrary to RN triage note).  Pt hasn't taken anything for pain.  Denies CP, SOB, weakness of upper ext, all other ROS negative.

## 2021-09-19 NOTE — ED ADULT NURSE NOTE - NSICDXPASTSURGICALHX_GEN_ALL_CORE_FT
PAST SURGICAL HISTORY:  H/O aortic valve replacement 2012 at [facility]    History of cardiac radiofrequency ablation 2/2020 for afib/flutter    History of gastric surgery

## 2021-09-19 NOTE — ED PROVIDER NOTE - PATIENT PORTAL LINK FT
You can access the FollowMyHealth Patient Portal offered by [facility] by registering at the following website: [email or web address]. By joining [facility]’s FollowMyHealth portal, you will also be able to view your health information using other applications (apps) compatible with our system.

## 2021-09-19 NOTE — ED ADULT NURSE NOTE - OBJECTIVE STATEMENT
55 y/o f hx HTN, CLL, aortic valve replacement on xarelto presents c/o right shoulder pain which radiates down her right arm for the past day.  Pt stating she has had mild neck pain for the past several months, thinks that is unchanged today although pain does run across right upper back as well.  Pt reports "pins and needles" feeling in right arm as well.

## 2021-09-19 NOTE — ED PROVIDER NOTE - MUSCULOSKELETAL, MLM
no midline spine tenderness, no crepitus or deformity.  right shoulder tenderness, +FROM, no swelling or deformity, strength 5/5, sensation intact distally, radial pulse 2+

## 2021-10-05 ENCOUNTER — APPOINTMENT (OUTPATIENT)
Dept: HEART AND VASCULAR | Facility: CLINIC | Age: 56
End: 2021-10-05
Payer: COMMERCIAL

## 2021-10-05 ENCOUNTER — NON-APPOINTMENT (OUTPATIENT)
Age: 56
End: 2021-10-05

## 2021-10-05 VITALS
HEART RATE: 67 BPM | WEIGHT: 240 LBS | SYSTOLIC BLOOD PRESSURE: 110 MMHG | BODY MASS INDEX: 39.99 KG/M2 | HEIGHT: 65 IN | TEMPERATURE: 97 F | DIASTOLIC BLOOD PRESSURE: 68 MMHG

## 2021-10-05 DIAGNOSIS — Z95.2 PRESENCE OF PROSTHETIC HEART VALVE: ICD-10-CM

## 2021-10-05 PROCEDURE — 93000 ELECTROCARDIOGRAM COMPLETE: CPT

## 2021-10-05 PROCEDURE — 99214 OFFICE O/P EST MOD 30 MIN: CPT | Mod: 25

## 2021-10-05 NOTE — PHYSICAL EXAM
[Well Developed] : well developed [Well Nourished] : well nourished [No Acute Distress] : no acute distress [Obese] : obese [Normal Conjunctiva] : normal conjunctiva [Normal Venous Pressure] : normal venous pressure [No Carotid Bruit] : no carotid bruit [Normal S1, S2] : normal S1, S2 [Clear Lung Fields] : clear lung fields [Good Air Entry] : good air entry [No Respiratory Distress] : no respiratory distress  [Soft] : abdomen soft [Non Tender] : non-tender [Normal Gait] : normal gait [No Edema] : no edema [No Varicosities] : no varicosities [No Rash] : no rash [Moves all extremities] : moves all extremities [Normal Speech] : normal speech [Alert and Oriented] : alert and oriented [de-identified] : soft systolic murmur

## 2021-10-05 NOTE — DISCUSSION/SUMMARY
[FreeTextEntry1] : EKG:NSR\par feel sanchez due to weight and CM\par called Dr Payne's office; she had CMP done- will get results- if K normal would increase entresto and cont BB and cont Lasix for CM/CHF \par cont Xarelto for PAF\par discussed COVIDS vaccine- advised to take it\par meds renewed

## 2021-11-15 ENCOUNTER — NON-APPOINTMENT (OUTPATIENT)
Age: 56
End: 2021-11-15

## 2021-11-18 ENCOUNTER — APPOINTMENT (OUTPATIENT)
Dept: HEART AND VASCULAR | Facility: CLINIC | Age: 56
End: 2021-11-18
Payer: COMMERCIAL

## 2021-11-18 ENCOUNTER — NON-APPOINTMENT (OUTPATIENT)
Age: 56
End: 2021-11-18

## 2021-11-18 VITALS
TEMPERATURE: 97.7 F | SYSTOLIC BLOOD PRESSURE: 100 MMHG | WEIGHT: 237 LBS | OXYGEN SATURATION: 98 % | DIASTOLIC BLOOD PRESSURE: 65 MMHG | HEIGHT: 65 IN | BODY MASS INDEX: 39.49 KG/M2 | HEART RATE: 116 BPM

## 2021-11-18 PROCEDURE — 93000 ELECTROCARDIOGRAM COMPLETE: CPT

## 2021-11-18 PROCEDURE — 99214 OFFICE O/P EST MOD 30 MIN: CPT | Mod: 25

## 2021-11-18 NOTE — PHYSICAL EXAM
[Well Developed] : well developed [Well Nourished] : well nourished [No Acute Distress] : no acute distress [Obese] : obese [Normal Conjunctiva] : normal conjunctiva [Normal Venous Pressure] : normal venous pressure [No Carotid Bruit] : no carotid bruit [Normal S1, S2] : normal S1, S2 [Clear Lung Fields] : clear lung fields [Good Air Entry] : good air entry [No Respiratory Distress] : no respiratory distress  [Soft] : abdomen soft [Non Tender] : non-tender [Normal Gait] : normal gait [No Edema] : no edema [No Varicosities] : no varicosities [No Rash] : no rash [Moves all extremities] : moves all extremities [Normal Speech] : normal speech [Alert and Oriented] : alert and oriented [de-identified] : soft systolic murmur ; tachycardic

## 2021-11-18 NOTE — HISTORY OF PRESENT ILLNESS
[FreeTextEntry1] : was at Dr Payne 3 days ago and noticed to have rapid pulse- BB was increased- had albs and pt states k was 4.0 and hb=10g. no cp,sob but feels chills

## 2021-11-18 NOTE — DISCUSSION/SUMMARY
[FreeTextEntry1] : EKG:atypical flutter vs PAT with block\par given lo bp will hold Lasix and will not increase entresto. cont toprol + entresto for CM/CHF; Xarelto +Metoprolol for af;. discussed with dr Nolen who will see pt early next week- advised to see DR Zhu)PCP) for chills- may have infection which is causing AF/PAT

## 2021-11-22 NOTE — PATIENT PROFILE ADULT - SAFE PLACE TO LIVE
Brief Operative Note    Patient: Griselda Peoples 62 year old female    MRN: [id number]    Surgeon(s): Lola Mccall MD  Phone Number: [phone]                       Surgeon(s) and Role:     * Lola Mccall MD - Primary    Assistant(s):none    Pre-Op Diagnosis: Postmenopausal bleeding [N95.0]     Post-Op Diagnosis:   PMB-Multiple endometrial polyps     Procedure: Procedure(s):  HYSTEROSCOPY, WITH DILATION AND CURETTAGE OF UTERUS, POSSIBLE ENDOMETRIAL POLYPECTOMY    Anesthesia Type: General                                   Complications: None        Findings: Multiple endometrial polyps    Specimens Removed:   ID Type Source Tests Collected by Time   A :  Tissue Endometrium SURGICAL PATHOLOGY Lola Mccall MD 11/22/2021 1226        Estimated Blood Loss: 5 ml    Assistant Tasks: none     Implants: none        
no

## 2021-11-24 ENCOUNTER — NON-APPOINTMENT (OUTPATIENT)
Age: 56
End: 2021-11-24

## 2021-11-24 ENCOUNTER — APPOINTMENT (OUTPATIENT)
Dept: HEART AND VASCULAR | Facility: CLINIC | Age: 56
End: 2021-11-24
Payer: COMMERCIAL

## 2021-11-24 VITALS
HEART RATE: 114 BPM | DIASTOLIC BLOOD PRESSURE: 68 MMHG | SYSTOLIC BLOOD PRESSURE: 100 MMHG | OXYGEN SATURATION: 100 % | BODY MASS INDEX: 39.49 KG/M2 | WEIGHT: 237 LBS | HEIGHT: 65 IN

## 2021-11-24 PROCEDURE — 93000 ELECTROCARDIOGRAM COMPLETE: CPT

## 2021-11-24 PROCEDURE — 99214 OFFICE O/P EST MOD 30 MIN: CPT

## 2021-11-24 NOTE — HISTORY OF PRESENT ILLNESS
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 Conner.  She had recurrent atypical atrial flutter now s/p DCCV 12/2020.  She reports DCCV lasted for a matter of hours and she was recommended to undergo another ablation procedure [facility].   She was started on Multaq 400 mg BID and is s/p ablation of atrial fibrillation and atypical atrial flutter on 2/17/21.  She had a non clinical atrial tachycardia at the end of the case and was cardioverted to SR.  ACE was not started 2/2 hypotension.  EF 40% by ECHO. \par \par She feels fine and was in a normal rhythm in October but noted to be back in and atrial arrhythmia with RVR (100's) in November. She returns for evaluation.  She is planning a wedding for her daughter on December 18th.  Currnently, she is taking Keflex for a foot/toe infection.  \par \par Her past history significant for COVID 19 and CDiff infection.  \par \par She denies palpitations.  She has been on Xarelto uninterrupted and denies any bleeding issues.\par

## 2021-12-22 ENCOUNTER — RX RENEWAL (OUTPATIENT)
Age: 56
End: 2021-12-22

## 2022-01-04 ENCOUNTER — APPOINTMENT (OUTPATIENT)
Dept: HEART AND VASCULAR | Facility: CLINIC | Age: 57
End: 2022-01-04
Payer: COMMERCIAL

## 2022-01-04 ENCOUNTER — RX RENEWAL (OUTPATIENT)
Age: 57
End: 2022-01-04

## 2022-01-04 VITALS
HEIGHT: 65 IN | DIASTOLIC BLOOD PRESSURE: 72 MMHG | BODY MASS INDEX: 39.49 KG/M2 | HEART RATE: 103 BPM | WEIGHT: 237 LBS | SYSTOLIC BLOOD PRESSURE: 106 MMHG

## 2022-01-04 DIAGNOSIS — I35.9 NONRHEUMATIC AORTIC VALVE DISORDER, UNSPECIFIED: ICD-10-CM

## 2022-01-04 PROCEDURE — 93306 TTE W/DOPPLER COMPLETE: CPT

## 2022-01-24 ENCOUNTER — APPOINTMENT (OUTPATIENT)
Dept: HEART AND VASCULAR | Facility: CLINIC | Age: 57
End: 2022-01-24
Payer: COMMERCIAL

## 2022-01-24 ENCOUNTER — NON-APPOINTMENT (OUTPATIENT)
Age: 57
End: 2022-01-24

## 2022-01-24 VITALS
BODY MASS INDEX: 45.48 KG/M2 | WEIGHT: 273 LBS | TEMPERATURE: 97.8 F | HEART RATE: 101 BPM | OXYGEN SATURATION: 99 % | DIASTOLIC BLOOD PRESSURE: 72 MMHG | HEIGHT: 65 IN | SYSTOLIC BLOOD PRESSURE: 100 MMHG

## 2022-01-24 PROCEDURE — 93000 ELECTROCARDIOGRAM COMPLETE: CPT

## 2022-01-24 PROCEDURE — 99214 OFFICE O/P EST MOD 30 MIN: CPT | Mod: 25

## 2022-01-24 RX ORDER — SILVER SULFADIAZINE 10 MG/G
1 CREAM TOPICAL
Qty: 85 | Refills: 0 | Status: DISCONTINUED | COMMUNITY
Start: 2021-12-26

## 2022-01-24 RX ORDER — AZELASTINE HYDROCHLORIDE 137 UG/1
0.1 SPRAY, METERED NASAL
Qty: 30 | Refills: 0 | Status: DISCONTINUED | COMMUNITY
Start: 2021-11-01

## 2022-01-24 RX ORDER — NITROFURANTOIN (MONOHYDRATE/MACROCRYSTALS) 25; 75 MG/1; MG/1
100 CAPSULE ORAL
Qty: 14 | Refills: 0 | Status: DISCONTINUED | COMMUNITY
Start: 2021-07-06

## 2022-01-24 RX ORDER — AMIODARONE HYDROCHLORIDE 200 MG/1
200 TABLET ORAL
Qty: 30 | Refills: 3 | Status: DISCONTINUED | COMMUNITY
Start: 2021-11-24 | End: 2022-01-24

## 2022-01-24 RX ORDER — MUPIROCIN 20 MG/G
2 OINTMENT TOPICAL
Qty: 22 | Refills: 0 | Status: DISCONTINUED | COMMUNITY
Start: 2021-11-22

## 2022-01-24 RX ORDER — CEFADROXIL 500 MG/1
500 CAPSULE ORAL
Qty: 14 | Refills: 0 | Status: DISCONTINUED | COMMUNITY
Start: 2021-11-22

## 2022-01-24 RX ORDER — OSELTAMIVIR PHOSPHATE 75 MG/1
75 CAPSULE ORAL
Qty: 10 | Refills: 0 | Status: DISCONTINUED | COMMUNITY
Start: 2021-12-12

## 2022-01-24 RX ORDER — DOXYCYCLINE HYCLATE 100 MG/1
100 TABLET ORAL
Qty: 20 | Refills: 0 | Status: DISCONTINUED | COMMUNITY
Start: 2021-12-26

## 2022-01-24 NOTE — PHYSICAL EXAM
[Well Developed] : well developed [Well Nourished] : well nourished [No Acute Distress] : no acute distress [Obese] : obese [Normal Conjunctiva] : normal conjunctiva [Normal Venous Pressure] : normal venous pressure [No Carotid Bruit] : no carotid bruit [Normal S1, S2] : normal S1, S2 [Clear Lung Fields] : clear lung fields [Good Air Entry] : good air entry [No Respiratory Distress] : no respiratory distress  [Soft] : abdomen soft [Non Tender] : non-tender [Normal Gait] : normal gait [No Edema] : no edema [No Varicosities] : no varicosities [No Rash] : no rash [Moves all extremities] : moves all extremities [Normal Speech] : normal speech [Alert and Oriented] : alert and oriented [de-identified] : soft systolic murmur ; tachycardic

## 2022-01-24 NOTE — DISCUSSION/SUMMARY
[FreeTextEntry1] : EKG:PAT\par continue amiodarone + BB for pat; Xarelto for PAF\par discussed with dr Nolen re:PAT and he will be seeing her next week\par gave samples of entresto\par cont Bb + entresto +Lasix for CM/CHF\par event recorder for pat\par discussed ened for antibiotic prophylaxis for AV/TAVR

## 2022-02-01 ENCOUNTER — APPOINTMENT (OUTPATIENT)
Dept: HEART AND VASCULAR | Facility: CLINIC | Age: 57
End: 2022-02-01
Payer: COMMERCIAL

## 2022-02-01 VITALS
WEIGHT: 240 LBS | TEMPERATURE: 97.6 F | HEIGHT: 65 IN | BODY MASS INDEX: 39.99 KG/M2 | DIASTOLIC BLOOD PRESSURE: 63 MMHG | SYSTOLIC BLOOD PRESSURE: 101 MMHG

## 2022-02-01 VITALS — HEART RATE: 98 BPM

## 2022-02-01 PROCEDURE — 99214 OFFICE O/P EST MOD 30 MIN: CPT

## 2022-02-01 PROCEDURE — 93000 ELECTROCARDIOGRAM COMPLETE: CPT

## 2022-02-03 NOTE — HISTORY OF PRESENT ILLNESS
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 Conner.  She had recurrent atypical atrial flutter now s/p DCCV 12/2020.  She reports DCCV lasted for a matter of hours and she was recommended to undergo another ablation procedure [place] [facility]   She was started on Multaq 400 mg BID and is s/p ablation of atrial fibrillation and atypical atrial flutter on 2/17/21.  She had a non clinical atrial tachycardia at the end of the case and was cardioverted to SR.  ACE was not started 2/2 hypotension.  EF 40% by ECHO. \par \par She feels fine and was in a normal rhythm in October but noted to be back in and atrial arrhythmia with RVR (100's) in November. Seen by her oncologist recently who was concerned about her abnormal rhythm on ECG. She has been unaware.  No CP, palpitations, dizziness, presyncope/syncope.  She has been on Xarelto uninterrupted and denies any bleeding issues.\par \par Her past history significant for COVID 19 and CDiff infection.  \par \par \par

## 2022-02-27 ENCOUNTER — RX RENEWAL (OUTPATIENT)
Age: 57
End: 2022-02-27

## 2022-03-01 ENCOUNTER — APPOINTMENT (OUTPATIENT)
Dept: HEART AND VASCULAR | Facility: CLINIC | Age: 57
End: 2022-03-01
Payer: COMMERCIAL

## 2022-03-01 ENCOUNTER — NON-APPOINTMENT (OUTPATIENT)
Age: 57
End: 2022-03-01

## 2022-03-01 VITALS
WEIGHT: 240 LBS | HEIGHT: 65 IN | TEMPERATURE: 97.3 F | BODY MASS INDEX: 39.99 KG/M2 | SYSTOLIC BLOOD PRESSURE: 120 MMHG | HEART RATE: 95 BPM | DIASTOLIC BLOOD PRESSURE: 70 MMHG

## 2022-03-01 PROCEDURE — 93000 ELECTROCARDIOGRAM COMPLETE: CPT

## 2022-03-01 PROCEDURE — 99214 OFFICE O/P EST MOD 30 MIN: CPT | Mod: 25

## 2022-03-01 NOTE — HISTORY OF PRESENT ILLNESS
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), S/P DCCV 1/2019, Atrial flutter ablation 2/2020 with Dr. Conner.  She had recurrent atypical atrial flutter now s/p DCCV 12/2020.  She reports DCCV lasted for a matter of hours and she was recommended to undergo another ablation procedure at [facility].   She was started on Multaq 400 mg BID and is s/p ablation of atrial fibrillation and atypical atrial flutter on 2/17/21.  She had a non clinical atrial tachycardia at the end of the case and was cardioverted to SR.  ACE was not started 2/2 hypotension.  EF 40% by ECHO. \par \par She feels fine and was in a normal rhythm in October but noted to be back in and atrial arrhythmia with RVR (100's) in November. Seen by her oncologist recently who was concerned about her abnormal rhythm on ECG. She has been aware of fluttering feeling in chest. No CP, palpitations, dizziness, presyncope/syncope.  She has been on Xarelto uninterrupted and denies any bleeding issues. Dyspnea on exertion after walking 2 blocks. \par \par Her past history significant for COVID 19 and CDiff infection.  \par \par \par

## 2022-03-01 NOTE — CARDIOLOGY SUMMARY
[___] : [unfilled] [de-identified] : 2/1/22 Atrial tachycardia with 2:1 AV conduction \par 3/1/22 NSR 92 bpm

## 2022-03-01 NOTE — PHYSICAL EXAM
[General Appearance - Well Developed] : well developed [Normal Appearance] : normal appearance [Well Groomed] : well groomed [General Appearance - Well Nourished] : well nourished [No Deformities] : no deformities [General Appearance - In No Acute Distress] : no acute distress [Eyelids - No Xanthelasma] : the eyelids demonstrated no xanthelasmas [Normal Oral Mucosa] : normal oral mucosa [No Oral Pallor] : no oral pallor [No Oral Cyanosis] : no oral cyanosis [Normal Jugular Venous A Waves Present] : normal jugular venous A waves present [Normal Jugular Venous V Waves Present] : normal jugular venous V waves present [No Jugular Venous Hong A Waves] : no jugular venous hong A waves [Abdomen Soft] : soft [Abdomen Tenderness] : non-tender [Abdomen Mass (___ Cm)] : no abdominal mass palpated [Abnormal Walk] : normal gait [Gait - Sufficient For Exercise Testing] : the gait was sufficient for exercise testing [Nail Clubbing] : no clubbing of the fingernails [Cyanosis, Localized] : no localized cyanosis [Petechial Hemorrhages (___cm)] : no petechial hemorrhages [Skin Color & Pigmentation] : normal skin color and pigmentation [] : no rash [No Venous Stasis] : no venous stasis [Skin Lesions] : no skin lesions [No Skin Ulcers] : no skin ulcer [No Xanthoma] : no  xanthoma was observed [Oriented To Time, Place, And Person] : oriented to person, place, and time [Affect] : the affect was normal [Mood] : the mood was normal [No Anxiety] : not feeling anxious [5th Left ICS - MCL] : palpated at the 5th LICS in the midclavicular line [Normal] : normal [Tachycardia] : tachycardic [Regularly Irregular] : regularly irregular [II] : a grade 2 [No Pitting Edema] : no pitting edema present [Well Developed] : well developed [Well Nourished] : well nourished [No Acute Distress] : no acute distress [Normal Conjunctiva] : normal conjunctiva [Normal Venous Pressure] : normal venous pressure [No Carotid Bruit] : no carotid bruit [Normal S1, S2] : normal S1, S2 [No Murmur] : no murmur [No Rub] : no rub [No Gallop] : no gallop [Clear Lung Fields] : clear lung fields [Good Air Entry] : good air entry [No Respiratory Distress] : no respiratory distress  [Soft] : abdomen soft [Non Tender] : non-tender [No Masses/organomegaly] : no masses/organomegaly [Normal Bowel Sounds] : normal bowel sounds [Normal Gait] : normal gait [No Edema] : no edema [No Cyanosis] : no cyanosis [No Clubbing] : no clubbing [No Varicosities] : no varicosities [No Rash] : no rash [No Skin Lesions] : no skin lesions [Moves all extremities] : moves all extremities [No Focal Deficits] : no focal deficits [Normal Speech] : normal speech [Alert and Oriented] : alert and oriented [Normal memory] : normal memory

## 2022-03-03 ENCOUNTER — APPOINTMENT (OUTPATIENT)
Dept: HEART AND VASCULAR | Facility: CLINIC | Age: 57
End: 2022-03-03
Payer: COMMERCIAL

## 2022-03-03 VITALS
SYSTOLIC BLOOD PRESSURE: 94 MMHG | WEIGHT: 240 LBS | TEMPERATURE: 97.2 F | DIASTOLIC BLOOD PRESSURE: 64 MMHG | HEIGHT: 65 IN | BODY MASS INDEX: 39.99 KG/M2 | HEART RATE: 92 BPM

## 2022-03-03 VITALS — SYSTOLIC BLOOD PRESSURE: 100 MMHG | DIASTOLIC BLOOD PRESSURE: 60 MMHG

## 2022-03-03 PROCEDURE — 99214 OFFICE O/P EST MOD 30 MIN: CPT | Mod: 25

## 2022-03-03 RX ORDER — DOXYLAMINE SUCCINATE 25 MG
25 TABLET ORAL
Refills: 0 | Status: DISCONTINUED | COMMUNITY
End: 2022-03-03

## 2022-03-03 RX ORDER — CLINDAMYCIN HYDROCHLORIDE 300 MG/1
300 CAPSULE ORAL
Qty: 20 | Refills: 0 | Status: DISCONTINUED | COMMUNITY
Start: 2022-01-19 | End: 2022-03-03

## 2022-03-03 NOTE — PHYSICAL EXAM
[Well Developed] : well developed [Well Nourished] : well nourished [No Acute Distress] : no acute distress [Obese] : obese [Normal Conjunctiva] : normal conjunctiva [Normal Venous Pressure] : normal venous pressure [No Carotid Bruit] : no carotid bruit [Normal S1, S2] : normal S1, S2 [Clear Lung Fields] : clear lung fields [Good Air Entry] : good air entry [No Respiratory Distress] : no respiratory distress  [Soft] : abdomen soft [Non Tender] : non-tender [Normal Gait] : normal gait [No Edema] : no edema [No Varicosities] : no varicosities [No Rash] : no rash [Moves all extremities] : moves all extremities [Normal Speech] : normal speech [Alert and Oriented] : alert and oriented [de-identified] : soft systolic murmur

## 2022-03-03 NOTE — HISTORY OF PRESENT ILLNESS
[FreeTextEntry1] : 55F h/o Afib/aflutter s/p ablation 2/17/21, AS s/p AVR (2012 at [place]), cystic kidney disease p/w fever and night sweats x 3 months. \par \par Patient said she was USOH until around 1/2021 when she started to have low grade fever of 99-100s a/w chills.  Temp never go up >101, and reports night sweats as well.  She also noted intermittent dry cough.  On 2/17, she went to elective ablation for Aflutter and stayed at [facility] overnight.  At that time she had WBC 20.  After the procedure, she developed terrible diarrhea, vomiting and higher fever shortly after, and she was found to have c.diff dharrhea which she was treated with PO vancomycin x 4 weeks.  She finished the course and diarrhea resolved.  however she continued to have low grade fever and night sweats.  Denied CP, rash, abdominal pain, back pain, weight change, dysuria, sore throat.  ET hasn't changed.  She was seen by Dr. Nava (with DANIEL He) on 3/9 and Dr. Nolen on 3/16.  She was referred to ID for infectious work-up.  She said she was told to get ADALGISA to r/o infection of heart valve but it is not scheduled yet. \par \par Of note, patient had COVID-19 PNA in 4/2020, had fever and cough, resolved without being admitted.  She took 10 days of cefpodoxime before 2/28 procedure since she had asymptomatic bacteriuria. \par \par Denied any recent travel, denied outdoor activities.  Lives in [place].  No pets, no animal exposure, no animal/bug bites.  Denied consumption of unpasteurised dairy products.  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  No sick contact, no one sick at home.  PAST MEDICAL HISTORY:  No pertinent past medical history

## 2022-03-03 NOTE — HISTORY OF PRESENT ILLNESS
[FreeTextEntry1] : saw Dr Nolen and in NSR\par still with mild SOB/AGUILAR- gets occasional palpitations and brief lightheadedness 3x week\par

## 2022-03-03 NOTE — DISCUSSION/SUMMARY
[FreeTextEntry1] : EKG 2 days ago: NSR\par will try and taper Lasix and continue entresto= Toprol for CM/CHF;Xarelto + Amiodarone for PAF

## 2022-04-17 ENCOUNTER — RX RENEWAL (OUTPATIENT)
Age: 57
End: 2022-04-17

## 2022-04-28 ENCOUNTER — APPOINTMENT (OUTPATIENT)
Dept: HEART AND VASCULAR | Facility: CLINIC | Age: 57
End: 2022-04-28
Payer: COMMERCIAL

## 2022-04-28 VITALS
SYSTOLIC BLOOD PRESSURE: 96 MMHG | HEART RATE: 92 BPM | DIASTOLIC BLOOD PRESSURE: 60 MMHG | BODY MASS INDEX: 39.99 KG/M2 | HEIGHT: 65 IN | WEIGHT: 240 LBS | OXYGEN SATURATION: 98 %

## 2022-04-28 DIAGNOSIS — Z01.810 ENCOUNTER FOR PREPROCEDURAL CARDIOVASCULAR EXAMINATION: ICD-10-CM

## 2022-04-28 PROCEDURE — 93000 ELECTROCARDIOGRAM COMPLETE: CPT | Mod: NC

## 2022-04-28 PROCEDURE — 99214 OFFICE O/P EST MOD 30 MIN: CPT | Mod: 25

## 2022-04-28 NOTE — DISCUSSION/SUMMARY
[FreeTextEntry1] : EKG:AF,ST/T wave abn(no change)\par discussed with Dr Nolen- feel that as amiodarone not effective in AF- can stop it and strive for rate control- he agrees will stop amiodarone and increase BB but wait until after surgery. Her BP is  a bit low but as asx will not change meds now( would try and stop Lasix but she states that she develops leg edema if off it)- continue Toprol + entresto + Lasix for CHF/CM; Xarelto +Toprol for AF\par will f/u 2 weeks post surgery\par Her cardiac status is optimized and stable enough to permit podiatric surgery. Xarelto can be held 2 days presurgery( none Day-2,day -1 and resume asap post surgery once stable enough to do so)

## 2022-04-28 NOTE — PHYSICAL EXAM
[Well Developed] : well developed [Well Nourished] : well nourished [No Acute Distress] : no acute distress [Obese] : obese [Normal Conjunctiva] : normal conjunctiva [Normal Venous Pressure] : normal venous pressure [No Carotid Bruit] : no carotid bruit [Clear Lung Fields] : clear lung fields [Good Air Entry] : good air entry [No Respiratory Distress] : no respiratory distress  [Soft] : abdomen soft [Non Tender] : non-tender [Normal Gait] : normal gait [No Edema] : no edema [No Varicosities] : no varicosities [No Rash] : no rash [Moves all extremities] : moves all extremities [Alert and Oriented] : alert and oriented [de-identified] : irregular;soft systolic murmur

## 2022-04-29 ENCOUNTER — NON-APPOINTMENT (OUTPATIENT)
Age: 57
End: 2022-04-29

## 2022-05-05 ENCOUNTER — APPOINTMENT (OUTPATIENT)
Dept: INFECTIOUS DISEASE | Facility: CLINIC | Age: 57
End: 2022-05-05
Payer: COMMERCIAL

## 2022-05-05 ENCOUNTER — LABORATORY RESULT (OUTPATIENT)
Age: 57
End: 2022-05-05

## 2022-05-05 VITALS
DIASTOLIC BLOOD PRESSURE: 69 MMHG | HEIGHT: 66 IN | WEIGHT: 254.38 LBS | SYSTOLIC BLOOD PRESSURE: 114 MMHG | TEMPERATURE: 97.2 F | HEART RATE: 89 BPM | BODY MASS INDEX: 40.88 KG/M2 | OXYGEN SATURATION: 99 %

## 2022-05-05 PROCEDURE — 99215 OFFICE O/P EST HI 40 MIN: CPT

## 2022-05-06 LAB
ALBUMIN SERPL ELPH-MCNC: 4.3 G/DL
ALP BLD-CCNC: 92 U/L
ALT SERPL-CCNC: 23 U/L
ANION GAP SERPL CALC-SCNC: 12 MMOL/L
APTT BLD: 39.8 SEC
AST SERPL-CCNC: 25 U/L
BASOPHILS # BLD AUTO: 0.01 K/UL
BASOPHILS NFR BLD AUTO: 0.2 %
BILIRUB SERPL-MCNC: 0.4 MG/DL
BUN SERPL-MCNC: 25 MG/DL
CALCIUM SERPL-MCNC: 9.2 MG/DL
CHLORIDE SERPL-SCNC: 105 MMOL/L
CO2 SERPL-SCNC: 24 MMOL/L
CREAT SERPL-MCNC: 1.19 MG/DL
CRP SERPL-MCNC: 7 MG/L
EGFR: 53 ML/MIN/1.73M2
EOSINOPHIL # BLD AUTO: 0.12 K/UL
EOSINOPHIL NFR BLD AUTO: 2.1 %
ERYTHROCYTE [SEDIMENTATION RATE] IN BLOOD BY WESTERGREN METHOD: 15 MM/HR
GLUCOSE SERPL-MCNC: 104 MG/DL
HCT VFR BLD CALC: 31.3 %
HGB BLD-MCNC: 9.4 G/DL
IMM GRANULOCYTES NFR BLD AUTO: 0.4 %
INR PPP: 2.26 RATIO
LYMPHOCYTES # BLD AUTO: 1.24 K/UL
LYMPHOCYTES NFR BLD AUTO: 22.1 %
MAN DIFF?: NORMAL
MCHC RBC-ENTMCNC: 30 GM/DL
MCHC RBC-ENTMCNC: 33 PG
MCV RBC AUTO: 109.8 FL
MONOCYTES # BLD AUTO: 0.48 K/UL
MONOCYTES NFR BLD AUTO: 8.5 %
NEUTROPHILS # BLD AUTO: 3.75 K/UL
NEUTROPHILS NFR BLD AUTO: 66.7 %
PLATELET # BLD AUTO: 220 K/UL
POTASSIUM SERPL-SCNC: 4.9 MMOL/L
PROT SERPL-MCNC: 6.5 G/DL
PT BLD: 26.4 SEC
RBC # BLD: 2.85 M/UL
RBC # FLD: 14.9 %
SODIUM SERPL-SCNC: 141 MMOL/L
WBC # FLD AUTO: 5.62 K/UL

## 2022-05-06 NOTE — HISTORY OF PRESENT ILLNESS
[FreeTextEntry1] : 55F h/o Afib/aflutter s/p ablation 2/17/21, AS s/p AVR (2012 at [place]), cystic kidney disease, CML. c.diff diarrhea in 3/2021, L foot bunion p/w evaluation and management of OM of foot.\par \par She has been having Bunion on L foot for a long time.  About 5 months ago she noticed a crack on L bunion which got worse over time.  The wound never healed but has discharge.  Was seen by Dr. Kline and was told that she has OM and need abx.  She was treated with doxycycline on and off for a while, also had keflex.  She thinks she has been on/off abx for a few months but cannot tell exact duration.  She thinks abx improved the wound but after stopping it the wound soon becomes worse.  Denied fever/chills, n/v/d.   Dr. Kline recommended that she get admitted for bone debridement but she didn't want to stay in the hospital but agreed to have outpatient surgery.  She is scheduled to have surgery on 5/10.  She is here for arranging IV abx. \par \par \par \par

## 2022-05-06 NOTE — ASSESSMENT
[FreeTextEntry1] : 55F h/o Afib/aflutter s/p ablation 2/17/21, AS s/p AVR (2012 at [place]), cystic kidney disease, CML. c.diff diarrhea in 3/2021, L foot bunion p/w evaluation and management of OM of foot.\par \par Case d/w Dr. Kline.  When she visited his office, ulcers at 1st and 5th MT area probed to bone and X-ray c/w OM.  He is planning to do resection of 1st and 5th MT bone but will have residual OM.  I had extensive discussion with patient about the abx plan.  She already failed multiple PO abx course but patient doesn't want to stay in the hospital. I think the best option will be that she stays overnight after the surgery and get PICC and discharge with empiric abx.  I will follow up bone culture to adjust abx.  Patient agreed with this plan.\par \par - labs today\par - hold abx to increase the yield of culture\par - will see patient in-house\par - will start IV dapto/cefepime after surgery\par - will also start PO vancomycin ppx given h/o c.diff diarrhea while on IV abx\par \par

## 2022-05-06 NOTE — PHYSICAL EXAM
[General Appearance - Alert] : alert [General Appearance - In No Acute Distress] : in no acute distress [Sclera] : the sclera and conjunctiva were normal [Outer Ear] : the ears and nose were normal in appearance [Neck Appearance] : the appearance of the neck was normal [] : no respiratory distress [Respiration, Rhythm And Depth] : normal respiratory rhythm and effort [Auscultation Breath Sounds / Voice Sounds] : lungs were clear to auscultation bilaterally [Heart Rate And Rhythm] : heart rate was normal and rhythm regular [Heart Sounds] : normal S1 and S2 [Bowel Sounds] : normal bowel sounds [Abdomen Soft] : soft [No Focal Deficits] : no focal deficits [Oriented To Time, Place, And Person] : oriented to person, place, and time [Affect] : the affect was normal [FreeTextEntry1] : L foot 1st and 5th metatarsal area with erythema and mild swelling, no active drainage, no ttp

## 2022-05-09 VITALS
HEART RATE: 89 BPM | DIASTOLIC BLOOD PRESSURE: 76 MMHG | SYSTOLIC BLOOD PRESSURE: 147 MMHG | TEMPERATURE: 98 F | WEIGHT: 253.09 LBS | HEIGHT: 64 IN | RESPIRATION RATE: 16 BRPM | OXYGEN SATURATION: 99 %

## 2022-05-09 RX ORDER — DRONEDARONE 400 MG/1
1 TABLET, FILM COATED ORAL
Qty: 0 | Refills: 0 | DISCHARGE

## 2022-05-09 NOTE — PATIENT PROFILE ADULT - STATED REASON FOR ADMISSION
partial excision  of first metatarsal and 5th metatarsal left foot , partial removal of toe bone left foot

## 2022-05-09 NOTE — PATIENT PROFILE ADULT - NSPRESCRALCFREQ_GEN_A_NUR
Never Complex Repair And Graft Additional Text (Will Appearing After The Standard Complex Repair Text): The complex repair was not sufficient to completely close the primary defect. The remaining additional defect was repaired with the graft mentioned below.

## 2022-05-09 NOTE — PATIENT PROFILE ADULT - FALL HARM RISK - RISK INTERVENTIONS
Assistance OOB with selected safe patient handling equipment/Assistance with ambulation/Communicate Fall Risk and Risk Factors to all staff, patient, and family/Discuss with provider need for PT consult/Monitor gait and stability/Reinforce activity limits and safety measures with patient and family/Visual Cue: Yellow wristband/Bed in lowest position, wheels locked, appropriate side rails in place/Call bell, personal items and telephone in reach/Instruct patient to call for assistance before getting out of bed or chair/Non-slip footwear when patient is out of bed/[place] to call system/Physically safe environment - no spills, clutter or unnecessary equipment/Purposeful Proactive Rounding/Room/bathroom lighting operational, light cord in reach

## 2022-05-09 NOTE — PATIENT PROFILE ADULT - FALL HARM RISK - UNIVERSAL INTERVENTIONS
Bed in lowest position, wheels locked, appropriate side rails in place/Call bell, personal items and telephone in reach/Instruct patient to call for assistance before getting out of bed or chair/Non-slip footwear when patient is out of bed/[place] to call system/Physically safe environment - no spills, clutter or unnecessary equipment/Purposeful Proactive Rounding/Room/bathroom lighting operational, light cord in reach

## 2022-05-10 ENCOUNTER — RESULT REVIEW (OUTPATIENT)
Age: 57
End: 2022-05-10

## 2022-05-10 ENCOUNTER — INPATIENT (INPATIENT)
Facility: HOSPITAL | Age: 57
LOS: 1 days | Discharge: HOME | DRG: 504 | End: 2022-05-12
Attending: INTERNAL MEDICINE
Payer: COMMERCIAL

## 2022-05-10 DIAGNOSIS — I48.91 UNSPECIFIED ATRIAL FIBRILLATION: ICD-10-CM

## 2022-05-10 DIAGNOSIS — M86.179 OTHER ACUTE OSTEOMYELITIS, UNSPECIFIED ANKLE AND FOOT: ICD-10-CM

## 2022-05-10 DIAGNOSIS — C92.10 CHRONIC MYELOID LEUKEMIA, BCR/ABL-POSITIVE, NOT HAVING ACHIEVED REMISSION: ICD-10-CM

## 2022-05-10 DIAGNOSIS — Z98.890 OTHER SPECIFIED POSTPROCEDURAL STATES: Chronic | ICD-10-CM

## 2022-05-10 DIAGNOSIS — Z86.79 PERSONAL HISTORY OF OTHER DISEASES OF THE CIRCULATORY SYSTEM: ICD-10-CM

## 2022-05-10 DIAGNOSIS — R63.8 OTHER SYMPTOMS AND SIGNS CONCERNING FOOD AND FLUID INTAKE: ICD-10-CM

## 2022-05-10 DIAGNOSIS — Z95.2 PRESENCE OF PROSTHETIC HEART VALVE: Chronic | ICD-10-CM

## 2022-05-10 PROCEDURE — 88311 DECALCIFY TISSUE: CPT | Mod: 26

## 2022-05-10 PROCEDURE — 88305 TISSUE EXAM BY PATHOLOGIST: CPT | Mod: 26

## 2022-05-10 PROCEDURE — 99223 1ST HOSP IP/OBS HIGH 75: CPT | Mod: GC

## 2022-05-10 PROCEDURE — 88304 TISSUE EXAM BY PATHOLOGIST: CPT | Mod: 26

## 2022-05-10 RX ORDER — LANOLIN ALCOHOL/MO/W.PET/CERES
3 CREAM (GRAM) TOPICAL AT BEDTIME
Refills: 0 | Status: DISCONTINUED | OUTPATIENT
Start: 2022-05-10 | End: 2022-05-12

## 2022-05-10 RX ORDER — FUROSEMIDE 40 MG
20 TABLET ORAL
Refills: 0 | Status: DISCONTINUED | OUTPATIENT
Start: 2022-05-10 | End: 2022-05-12

## 2022-05-10 RX ORDER — ACETAMINOPHEN 500 MG
650 TABLET ORAL EVERY 6 HOURS
Refills: 0 | Status: DISCONTINUED | OUTPATIENT
Start: 2022-05-10 | End: 2022-05-12

## 2022-05-10 RX ORDER — IMATINIB MESYLATE 400 MG/1
300 TABLET, FILM COATED ORAL DAILY
Refills: 0 | Status: DISCONTINUED | OUTPATIENT
Start: 2022-05-10 | End: 2022-05-11

## 2022-05-10 RX ORDER — ONDANSETRON 8 MG/1
4 TABLET, FILM COATED ORAL EVERY 8 HOURS
Refills: 0 | Status: DISCONTINUED | OUTPATIENT
Start: 2022-05-10 | End: 2022-05-12

## 2022-05-10 RX ORDER — METOPROLOL TARTRATE 50 MG
25 TABLET ORAL
Refills: 0 | Status: DISCONTINUED | OUTPATIENT
Start: 2022-05-10 | End: 2022-05-12

## 2022-05-10 RX ORDER — ALLOPURINOL 300 MG
100 TABLET ORAL AT BEDTIME
Refills: 0 | Status: DISCONTINUED | OUTPATIENT
Start: 2022-05-10 | End: 2022-05-12

## 2022-05-10 RX ORDER — SACUBITRIL AND VALSARTAN 24; 26 MG/1; MG/1
1 TABLET, FILM COATED ORAL
Refills: 0 | Status: DISCONTINUED | OUTPATIENT
Start: 2022-05-10 | End: 2022-05-12

## 2022-05-10 RX ORDER — IMATINIB MESYLATE 400 MG/1
300 TABLET, FILM COATED ORAL
Qty: 0 | Refills: 0 | DISCHARGE

## 2022-05-10 RX ORDER — AMIODARONE HYDROCHLORIDE 400 MG/1
2 TABLET ORAL
Qty: 0 | Refills: 0 | DISCHARGE

## 2022-05-10 NOTE — H&P ADULT - NSHPPHYSICALEXAM_GEN_ALL_CORE
VITAL SIGNS:  T(C): 36.5 (05-10-22 @ 19:59), Max: 36.5 (05-10-22 @ 19:59)  T(F): 97.7 (05-10-22 @ 19:59), Max: 97.7 (05-10-22 @ 19:59)  HR: 86 (05-10-22 @ 19:59) (86 - 88)  BP: 126/71 (05-10-22 @ 19:59) (105/65 - 126/71)  BP(mean): 92 (05-10-22 @ 19:59) (80 - 92)  RR: 18 (05-10-22 @ 19:59) (15 - 21)  SpO2: 99% (05-10-22 @ 19:59) (96% - 100%)    PHYSICAL EXAM:  Constitutional: WDWN, NAD  Head: NC/AT  Eyes: PERRL, EOMI, anicteric sclera  ENT: no nasal discharge; uvula midline, no oropharyngeal erythema or exudates; MMM  Neck: supple; no JVD or thyromegaly  Respiratory: CTA B/L; no W/R/R, no retractions  Cardiac: irregular rhythm at regular rate  Gastrointestinal: abdomen soft, NT/ND; no rebound or guarding; +BSx4  Back: spine midline, no bony tenderness or step-offs; no CVAT B/L  Extremities: WWP, surgical dressing in place over left foot  Musculoskeletal: NROM x4; no joint swelling, tenderness or erythema  Dermatologic: skin warm, dry and intact; no rashes, wounds, or scars  Lymphatic: no submandibular or cervical LAD  Neurologic: AAOx3; CNII-XII grossly intact; no focal deficits  Psychiatric: affect and characteristics of appearance, verbalizations, behaviors are appropriate

## 2022-05-10 NOTE — H&P ADULT - PROBLEM SELECTOR PLAN 1
Patient reports several months of pain in the foot and is now s/p left metatarsal bone and 5th proximal phalanx resection with clean margins. Case discussed with podiatry and as per their plan will hold off on antibiotics until patient is seen by Dr. Corey in AM.  - Hold off on ABx for now as per podiatry  - Hold off on AC as per podiatry recs  - Wait for surgical pathology  - Will check A1c with AM labs to test for DM

## 2022-05-10 NOTE — H&P ADULT - ATTENDING COMMENTS
#Left foot OM: s/p left metatarsal bone and 5th proximal phalanx resection with clean margins. Currently HDS/afebrile, f/up post op labs. Per ID/podiatry hold off empiric abx, f/up cultures, ID recs in AM

## 2022-05-10 NOTE — H&P ADULT - HISTORY OF PRESENT ILLNESS
55F PMH aortic regurgitation s/p AVR in 2012, CML, atrial fibrillation s/p ablation in 2021 presents with suspected osteomyelitis of the left foot admitted following elective resection of the left metatarsal bone and 5th proximal phalanx resection with clean margins. Patient seen in PACU following procedure for admission to medicine for ID workup.   Patient reports pain in her foot has been ongoing for 'years'. Patient was seen by cardiology and her primary care physician prior to OR for risk stratification and currently is off her xarelto. Patient at time of exam is without complaints and reports her pain is well controlled without opioid medications.  Vital signs stable in PACU.   Case d/w podiatry and patient now for admission to medicine service.

## 2022-05-10 NOTE — H&P ADULT - ASSESSMENT
55F PMH aortic regurgitation s/p AVR in 2012, CML on imatinib and allopurinol atrial fibrillation s/p ablation presents with osteomyelitis now s/p surgical resection of the left metatarsal bone and 5th proximal phalanx resection with clean margins

## 2022-05-10 NOTE — H&P ADULT - PROBLEM SELECTOR PLAN 4
Patient with a history aortic valve stenosis s/p open AVR in 2012.  - Continue with medication as above  - Patient is unsure of EF or diagnosis of HF, however is on entresto, will continue this home medication while inpatient

## 2022-05-10 NOTE — H&P ADULT - NSICDXPASTMEDICALHX_GEN_ALL_CORE_FT
PAST MEDICAL HISTORY:  Afib     Atrial fibrillation and flutter     CML (chronic myelocytic leukemia)     Congenital polycystic kidney     H/O aortic valve stenosis     Obesity

## 2022-05-10 NOTE — H&P ADULT - PROBLEM SELECTOR PLAN 2
Patient with a longstanding history of atrial fibrillation refractory to ablation. Patient was on amiodarone which was discontinued 2 weeks prior to OR. Patient also was on xarelto which was held for 2 days prior to OR as per cardiology recs  - Continue to hold xarelto for now  - Continue with home metoprolol succinate 25 mg PO BID  - EKG in AM

## 2022-05-10 NOTE — H&P ADULT - NSICDXPASTSURGICALHX_GEN_ALL_CORE_FT
PAST SURGICAL HISTORY:  H/O aortic valve replacement 2012 at [facility]    History of cardiac radiofrequency ablation 2/2020 for afib/flutter    History of gastric surgery

## 2022-05-11 ENCOUNTER — TRANSCRIPTION ENCOUNTER (OUTPATIENT)
Age: 57
End: 2022-05-11

## 2022-05-11 LAB
ALBUMIN SERPL ELPH-MCNC: 3.6 G/DL — SIGNIFICANT CHANGE UP (ref 3.3–5)
ALP SERPL-CCNC: 89 U/L — SIGNIFICANT CHANGE UP (ref 40–120)
ALT FLD-CCNC: 19 U/L — SIGNIFICANT CHANGE UP (ref 10–45)
ANION GAP SERPL CALC-SCNC: 8 MMOL/L — SIGNIFICANT CHANGE UP (ref 5–17)
ANISOCYTOSIS BLD QL: SLIGHT — SIGNIFICANT CHANGE UP
AST SERPL-CCNC: 21 U/L — SIGNIFICANT CHANGE UP (ref 10–40)
BASOPHILS # BLD AUTO: 0 K/UL — SIGNIFICANT CHANGE UP (ref 0–0.2)
BASOPHILS NFR BLD AUTO: 0 % — SIGNIFICANT CHANGE UP (ref 0–2)
BILIRUB SERPL-MCNC: 0.4 MG/DL — SIGNIFICANT CHANGE UP (ref 0.2–1.2)
BUN SERPL-MCNC: 20 MG/DL — SIGNIFICANT CHANGE UP (ref 7–23)
CALCIUM SERPL-MCNC: 8.4 MG/DL — SIGNIFICANT CHANGE UP (ref 8.4–10.5)
CHLORIDE SERPL-SCNC: 106 MMOL/L — SIGNIFICANT CHANGE UP (ref 96–108)
CK SERPL-CCNC: 100 U/L — SIGNIFICANT CHANGE UP (ref 25–170)
CO2 SERPL-SCNC: 25 MMOL/L — SIGNIFICANT CHANGE UP (ref 22–31)
CREAT SERPL-MCNC: 1.09 MG/DL — SIGNIFICANT CHANGE UP (ref 0.5–1.3)
EGFR: 59 ML/MIN/1.73M2 — LOW
EOSINOPHIL # BLD AUTO: 0 K/UL — SIGNIFICANT CHANGE UP (ref 0–0.5)
EOSINOPHIL NFR BLD AUTO: 0 % — SIGNIFICANT CHANGE UP (ref 0–6)
GIANT PLATELETS BLD QL SMEAR: PRESENT — SIGNIFICANT CHANGE UP
GLUCOSE SERPL-MCNC: 102 MG/DL — HIGH (ref 70–99)
GRAM STN FLD: SIGNIFICANT CHANGE UP
HCT VFR BLD CALC: 28.4 % — LOW (ref 34.5–45)
HGB BLD-MCNC: 8.8 G/DL — LOW (ref 11.5–15.5)
HYPOCHROMIA BLD QL: SLIGHT — SIGNIFICANT CHANGE UP
LYMPHOCYTES # BLD AUTO: 0.49 K/UL — LOW (ref 1–3.3)
LYMPHOCYTES # BLD AUTO: 7.9 % — LOW (ref 13–44)
MACROCYTES BLD QL: SLIGHT — SIGNIFICANT CHANGE UP
MAGNESIUM SERPL-MCNC: 2.3 MG/DL — SIGNIFICANT CHANGE UP (ref 1.6–2.6)
MANUAL SMEAR VERIFICATION: SIGNIFICANT CHANGE UP
MCHC RBC-ENTMCNC: 31 GM/DL — LOW (ref 32–36)
MCHC RBC-ENTMCNC: 33.8 PG — SIGNIFICANT CHANGE UP (ref 27–34)
MCV RBC AUTO: 109.2 FL — HIGH (ref 80–100)
MICROCYTES BLD QL: SLIGHT — SIGNIFICANT CHANGE UP
MONOCYTES # BLD AUTO: 0.6 K/UL — SIGNIFICANT CHANGE UP (ref 0–0.9)
MONOCYTES NFR BLD AUTO: 9.6 % — SIGNIFICANT CHANGE UP (ref 2–14)
NEUTROPHILS # BLD AUTO: 5.12 K/UL — SIGNIFICANT CHANGE UP (ref 1.8–7.4)
NEUTROPHILS NFR BLD AUTO: 82.5 % — HIGH (ref 43–77)
OVALOCYTES BLD QL SMEAR: SLIGHT — SIGNIFICANT CHANGE UP
PHOSPHATE SERPL-MCNC: 4.1 MG/DL — SIGNIFICANT CHANGE UP (ref 2.5–4.5)
PLAT MORPH BLD: ABNORMAL
PLATELET # BLD AUTO: 188 K/UL — SIGNIFICANT CHANGE UP (ref 150–400)
POIKILOCYTOSIS BLD QL AUTO: SLIGHT — SIGNIFICANT CHANGE UP
POLYCHROMASIA BLD QL SMEAR: SLIGHT — SIGNIFICANT CHANGE UP
POTASSIUM SERPL-MCNC: 4.6 MMOL/L — SIGNIFICANT CHANGE UP (ref 3.5–5.3)
POTASSIUM SERPL-SCNC: 4.6 MMOL/L — SIGNIFICANT CHANGE UP (ref 3.5–5.3)
PROT SERPL-MCNC: 6 G/DL — SIGNIFICANT CHANGE UP (ref 6–8.3)
RBC # BLD: 2.6 M/UL — LOW (ref 3.8–5.2)
RBC # FLD: 14.6 % — HIGH (ref 10.3–14.5)
RBC BLD AUTO: ABNORMAL
SODIUM SERPL-SCNC: 139 MMOL/L — SIGNIFICANT CHANGE UP (ref 135–145)
SPHEROCYTES BLD QL SMEAR: SLIGHT — SIGNIFICANT CHANGE UP
WBC # BLD: 6.2 K/UL — SIGNIFICANT CHANGE UP (ref 3.8–10.5)
WBC # FLD AUTO: 6.2 K/UL — SIGNIFICANT CHANGE UP (ref 3.8–10.5)

## 2022-05-11 PROCEDURE — 36573 INSJ PICC RS&I 5 YR+: CPT

## 2022-05-11 PROCEDURE — 99222 1ST HOSP IP/OBS MODERATE 55: CPT

## 2022-05-11 PROCEDURE — 99233 SBSQ HOSP IP/OBS HIGH 50: CPT

## 2022-05-11 PROCEDURE — 73630 X-RAY EXAM OF FOOT: CPT | Mod: 26,LT

## 2022-05-11 PROCEDURE — 93010 ELECTROCARDIOGRAM REPORT: CPT

## 2022-05-11 RX ORDER — IMATINIB MESYLATE 400 MG/1
300 TABLET, FILM COATED ORAL ONCE
Refills: 0 | Status: COMPLETED | OUTPATIENT
Start: 2022-05-11 | End: 2022-05-11

## 2022-05-11 RX ORDER — RIVAROXABAN 15 MG-20MG
20 KIT ORAL
Refills: 0 | Status: DISCONTINUED | OUTPATIENT
Start: 2022-05-12 | End: 2022-05-12

## 2022-05-11 RX ORDER — CEFEPIME 1 G/1
2 INJECTION, POWDER, FOR SOLUTION INTRAMUSCULAR; INTRAVENOUS
Qty: 0 | Refills: 0 | DISCHARGE
Start: 2022-05-11

## 2022-05-11 RX ORDER — CEFEPIME 1 G/1
2000 INJECTION, POWDER, FOR SOLUTION INTRAMUSCULAR; INTRAVENOUS EVERY 8 HOURS
Refills: 0 | Status: DISCONTINUED | OUTPATIENT
Start: 2022-05-11 | End: 2022-05-12

## 2022-05-11 RX ORDER — DAPTOMYCIN 500 MG/10ML
900 INJECTION, POWDER, LYOPHILIZED, FOR SOLUTION INTRAVENOUS EVERY 24 HOURS
Refills: 0 | Status: DISCONTINUED | OUTPATIENT
Start: 2022-05-11 | End: 2022-05-12

## 2022-05-11 RX ORDER — DAPTOMYCIN 500 MG/10ML
900 INJECTION, POWDER, LYOPHILIZED, FOR SOLUTION INTRAVENOUS
Qty: 0 | Refills: 0 | DISCHARGE
Start: 2022-05-11

## 2022-05-11 RX ORDER — RIVAROXABAN 15 MG-20MG
20 KIT ORAL
Refills: 0 | Status: DISCONTINUED | OUTPATIENT
Start: 2022-05-11 | End: 2022-05-11

## 2022-05-11 RX ADMIN — Medication 100 MILLIGRAM(S): at 22:21

## 2022-05-11 RX ADMIN — SACUBITRIL AND VALSARTAN 1 TABLET(S): 24; 26 TABLET, FILM COATED ORAL at 17:56

## 2022-05-11 RX ADMIN — RIVAROXABAN 20 MILLIGRAM(S): KIT at 10:17

## 2022-05-11 RX ADMIN — Medication 650 MILLIGRAM(S): at 19:49

## 2022-05-11 RX ADMIN — IMATINIB MESYLATE 300 MILLIGRAM(S): 400 TABLET, FILM COATED ORAL at 23:58

## 2022-05-11 RX ADMIN — DAPTOMYCIN 136 MILLIGRAM(S): 500 INJECTION, POWDER, LYOPHILIZED, FOR SOLUTION INTRAVENOUS at 15:20

## 2022-05-11 RX ADMIN — Medication 20 MILLIGRAM(S): at 05:50

## 2022-05-11 RX ADMIN — Medication 25 MILLIGRAM(S): at 05:50

## 2022-05-11 RX ADMIN — Medication 25 MILLIGRAM(S): at 17:56

## 2022-05-11 RX ADMIN — CEFEPIME 100 MILLIGRAM(S): 1 INJECTION, POWDER, FOR SOLUTION INTRAMUSCULAR; INTRAVENOUS at 17:56

## 2022-05-11 RX ADMIN — CEFEPIME 100 MILLIGRAM(S): 1 INJECTION, POWDER, FOR SOLUTION INTRAMUSCULAR; INTRAVENOUS at 10:35

## 2022-05-11 NOTE — CONSULT NOTE ADULT - ASSESSMENT
55F h/o Afib/aflutter s/p ablation 2/17/21, AS s/p AVR (2012 at [place]), cystic kidney disease, CML. c.diff diarrhea in 3/2021, L foot bunion c/b 1st and 5th toe OM p/w surgical debridement of OM of foot.  Now s/p L foot 1st MT bone resection, 5th proximal phalanx resection on 5/10.   Will start empiric IV abx while awaiting for bone culture, and will narrow down as outpatient in 1 week.  Patient has h/o c.diff diarrhea > 1 year ago.  Based on the recommendation, c.diff prophylaxis is not indicated while on IV abx (c.diff episode >3 months ago).    - start daptomycin 900mg IV q24h  - start cefepime 2g IV q8h  - f/u bone pathology and culture  - will adjust abx based on the culture result  - check CK  - Place single lumen PICC line   - Duration of antibiotics is 6 weeks ( 5/11 - 6/21)  - Weekly labs: CMP, CBC, ESR, CRP, CK faxed to ID office at [phone]  - Patient to follow up with Dr. Corey in 1 weeks ([address] [place] [address], [phone]), ID office will call patient to schedule       Thank you for your consult.  Please re-consult us or call us with questions.  Case d/w primary team.    Elizabeth Corey MD, MS  Infectious Disease attending  work cell [phone]  For any questions during evening/weekend/holiday, please page ID on call   
Assessment: 55F h/o Afib/aflutter s/p ablation 2/17/21, AS s/p AVR (2012 at [place]), cystic kidney disease, CML. c.diff diarrhea in 3/2021, L foot bunion c/b 1st and 5th toe OM p/w surgical debridement of OM of foot.  Now s/p L foot 1st MT bone resection, 5th proximal phalanx resection on 5/10. ID recommending single lumen PICC placement for antibiotics. IR consulted for procedure. Case reviewed with Dr. Penny, plan for procedure with local anesthesia.     Recommendations: Please ensure Covid swab is up to date (within last 72 hours).    Communicated with: primary team

## 2022-05-11 NOTE — PHYSICAL THERAPY INITIAL EVALUATION ADULT - LEVEL OF INDEPENDENCE: STAND/SIT, REHAB EVAL
Mom wanted to make sure we had records from [facility] in patient's chart.  Mom states Tamiko has been loosing her hair and has headaches.  She is also stiffer now then she was prior to getting the baclofen pump.  The NP mika gonsalez from [facility] recommended that she see her primary care physician to do a lab workup to check her thyroid, electrolytes, and other testing that Carlie could not recall.  Patient is scheduled on 05/30/2017 to evaluate this.     independent

## 2022-05-11 NOTE — DISCHARGE NOTE PROVIDER - NSDCMRMEDTOKEN_GEN_ALL_CORE_FT
allopurinol 100 mg oral tablet: 1 tab(s) orally once a day (at bedtime)  cefepime 2 g intravenous injection: 2 gram(s) intravenous every 8 hours  DAPTOmycin 500 mg intravenous injection: 900 milligram(s) intravenous every 24 hours  Entresto 24 mg-26 mg oral tablet: 1 tab(s) orally 2 times a day  furosemide 20 mg oral tablet: 1 tab(s) orally once a day  imatinib 100 mg oral capsule: 300 milligram(s) orally once a day  metoprolol succinate 25 mg oral tablet, extended release: 1 tab(s) orally 2 times a day   Xarelto 20 mg oral tablet: 1 tab(s) orally once a day (in the evening)

## 2022-05-11 NOTE — DISCHARGE NOTE PROVIDER - PROVIDER TOKENS
PROVIDER:[TOKEN:[[id number]],FOLLOWUP:[1 week]],PROVIDER:[TOKEN:[[id number]],FOLLOWUP:[1 week]]

## 2022-05-11 NOTE — CONSULT NOTE ADULT - SUBJECTIVE AND OBJECTIVE BOX
INFECTIOUS DISEASES INITIAL CONSULT NOTE    HPI: 55F h/o Afib/aflutter s/p ablation 2/17/21, AS s/p AVR (2012 at [place]), cystic kidney disease, CML. c.diff diarrhea in 3/2021, L foot bunion c/b 1st and 5th toe OM p/w surgical debridement of OM of foot.  She has been having Bunion on L foot for a long time. About 5 months ago she noticed a crack on L bunion which got worse over time. The wound never healed but has discharge. Was seen by Dr. Kline and was told that she has OM and need abx. She was treated with doxycycline on and off for a while, also had keflex. She thinks she has been on/off abx for a few months but cannot tell exact duration. She thinks abx improved the wound but after stopping it the wound soon becomes worse. Denied fever/chills, n/v/d.   She initially refused to get admitted and wanted to have outpatient management.  She was seen by me on 5/5 and was recommended to be admitted after surgery for initiation of empiric IV abx, which she agreed.  She presented to [facility] on 5/10 for scheduled surgery and underwent L foot 1st MT bone resection, 5th proximal phalanx resection on 5/10.  ID was consulted for abx rec.  Currently she is asymptomatic, itching to leave.         PAST MEDICAL & SURGICAL HISTORY:  Afib      Atrial fibrillation and flutter      Obesity      Congenital polycystic kidney      H/O aortic valve stenosis      CML (chronic myelocytic leukemia)      H/O aortic valve replacement  2012 at [facility]      History of gastric surgery      History of cardiac radiofrequency ablation  2/2020 for afib/flutter            Review of Systems:   Constitutional, eyes, ENT, cardiovascular, respiratory, gastrointestinal, genitourinary, integumentary, neurological, psychiatric and heme/lymph are otherwise negative other than noted above       ANTIBIOTICS:  MEDICATIONS  (STANDING):  allopurinol 100 milliGRAM(s) Oral at bedtime  cefepime   IVPB 2000 milliGRAM(s) IV Intermittent every 8 hours  DAPTOmycin IVPB 900 milliGRAM(s) IV Intermittent every 24 hours  furosemide    Tablet 20 milliGRAM(s) Oral <User Schedule>  imatinib 300 milliGRAM(s) Oral daily  metoprolol succinate ER 25 milliGRAM(s) Oral two times a day  sacubitril 24 mG/valsartan 26 mG 1 Tablet(s) Oral two times a day    MEDICATIONS  (PRN):  acetaminophen     Tablet .. 650 milliGRAM(s) Oral every 6 hours PRN Mild Pain (1 - 3)  aluminum hydroxide/magnesium hydroxide/simethicone Suspension 30 milliLiter(s) Oral every 4 hours PRN Dyspepsia  melatonin 3 milliGRAM(s) Oral at bedtime PRN Insomnia  ondansetron Injectable 4 milliGRAM(s) IV Push every 8 hours PRN Nausea and/or Vomiting      Allergies    penicillin (Unknown)    Intolerances        SOCIAL HISTORY:  Denied toxic habits.  Lives with her  in [place].    FAMILY HISTORY:   no FH leading to current infection    Vital Signs Last 24 Hrs  T(C): 37.3 (11 May 2022 05:45), Max: 37.3 (11 May 2022 05:45)  T(F): 99.2 (11 May 2022 05:45), Max: 99.2 (11 May 2022 05:45)  HR: 94 (11 May 2022 05:45) (86 - 94)  BP: 101/65 (11 May 2022 05:45) (98/66 - 126/71)  BP(mean): 77 (10 May 2022 20:30) (77 - 92)  RR: 19 (11 May 2022 05:45) (15 - 21)  SpO2: 95% (11 May 2022 05:45) (95% - 100%)      PHYSICAL EXAM:  Constitutional: alert, NAD  Eyes: the sclera and conjunctiva were normal.   ENT: the ears and nose were normal in appearance.   Neck: the appearance of the neck was normal and the neck was supple.   Pulmonary: no respiratory distress and lungs were clear to auscultation bilaterally.   Heart: heart rate was normal and rhythm regular, normal S1 and S2  Vascular:. there was no peripheral edema  Abdomen: normal bowel sounds, soft, non-tender  Ext: L foot covered with dressing       LABS:                        8.8    6.20  )-----------( 188      ( 11 May 2022 05:51 )             28.4     05-11    139  |  106  |  20  ----------------------------<  102<H>  4.6   |  25  |  1.09    Ca    8.4      11 May 2022 05:51  Phos  4.1     05-11  Mg     2.3     05-11    TPro  6.0  /  Alb  3.6  /  TBili  0.4  /  DBili  x   /  AST  21  /  ALT  19  /  AlkPhos  89  05-11          MICROBIOLOGY:    RADIOLOGY & ADDITIONAL STUDIES:   INFECTIOUS DISEASES INITIAL CONSULT NOTE    HPI: 55F h/o Afib/aflutter s/p ablation 2/17/21, AS s/p AVR (2012 at [place]), cystic kidney disease, CML. c.diff diarrhea in 3/2021, L foot bunion c/b 1st and 5th toe OM p/w surgical debridement of OM of foot.  She has been having Bunion on L foot for a long time. About 5 months ago she noticed a crack on L bunion which got worse over time. The wound never healed but has discharge. Was seen by Dr. Kline and was told that she has OM and need abx. She was treated with doxycycline on and off for a while, also had keflex. She thinks she has been on/off abx for a few months but cannot tell exact duration. She thinks abx improved the wound but after stopping it the wound soon becomes worse. Denied fever/chills, n/v/d.   She initially refused to get admitted and wanted to have outpatient management.  She was seen by me on 5/5 and was recommended to be admitted after surgery for initiation of empiric IV abx, which she agreed.  She presented to [facility] on 5/10 for scheduled surgery and underwent L foot 1st MT bone resection, 5th proximal phalanx resection on 5/10.  ID was consulted for abx rec.  Currently she is asymptomatic, itching to leave.         PAST MEDICAL & SURGICAL HISTORY:  Afib      Atrial fibrillation and flutter      Obesity      Congenital polycystic kidney      H/O aortic valve stenosis      CML (chronic myelocytic leukemia)      H/O aortic valve replacement  2012 at [facility]      History of gastric surgery      History of cardiac radiofrequency ablation  2/2020 for afib/flutter            Review of Systems:   Constitutional, eyes, ENT, cardiovascular, respiratory, gastrointestinal, genitourinary, integumentary, neurological, psychiatric and heme/lymph are otherwise negative other than noted above       ANTIBIOTICS:  MEDICATIONS  (STANDING):  allopurinol 100 milliGRAM(s) Oral at bedtime  cefepime   IVPB 2000 milliGRAM(s) IV Intermittent every 8 hours  DAPTOmycin IVPB 900 milliGRAM(s) IV Intermittent every 24 hours  furosemide    Tablet 20 milliGRAM(s) Oral <User Schedule>  imatinib 300 milliGRAM(s) Oral daily  metoprolol succinate ER 25 milliGRAM(s) Oral two times a day  sacubitril 24 mG/valsartan 26 mG 1 Tablet(s) Oral two times a day    MEDICATIONS  (PRN):  acetaminophen     Tablet .. 650 milliGRAM(s) Oral every 6 hours PRN Mild Pain (1 - 3)  aluminum hydroxide/magnesium hydroxide/simethicone Suspension 30 milliLiter(s) Oral every 4 hours PRN Dyspepsia  melatonin 3 milliGRAM(s) Oral at bedtime PRN Insomnia  ondansetron Injectable 4 milliGRAM(s) IV Push every 8 hours PRN Nausea and/or Vomiting      Allergies    penicillin (Unknown)    Intolerances        SOCIAL HISTORY:  Denied toxic habits.  Lives with her  in [place].    FAMILY HISTORY:   no FH leading to current infection    Vital Signs Last 24 Hrs  T(C): 37.3 (11 May 2022 05:45), Max: 37.3 (11 May 2022 05:45)  T(F): 99.2 (11 May 2022 05:45), Max: 99.2 (11 May 2022 05:45)  HR: 94 (11 May 2022 05:45) (86 - 94)  BP: 101/65 (11 May 2022 05:45) (98/66 - 126/71)  BP(mean): 77 (10 May 2022 20:30) (77 - 92)  RR: 19 (11 May 2022 05:45) (15 - 21)  SpO2: 95% (11 May 2022 05:45) (95% - 100%)      PHYSICAL EXAM:  Constitutional: alert, NAD  Eyes: the sclera and conjunctiva were normal.   ENT: the ears and nose were normal in appearance.   Neck: the appearance of the neck was normal and the neck was supple.   Pulmonary: no respiratory distress and lungs were clear to auscultation bilaterally.   Heart: heart rate was normal and rhythm regular, normal S1 and S2  Vascular:. there was no peripheral edema  Abdomen: normal bowel sounds, soft, non-tender  Ext: L foot covered with dressing       LABS:                        8.8    6.20  )-----------( 188      ( 11 May 2022 05:51 )             28.4     05-11    139  |  106  |  20  ----------------------------<  102<H>  4.6   |  25  |  1.09    Ca    8.4      11 May 2022 05:51  Phos  4.1     05-11  Mg     2.3     05-11    TPro  6.0  /  Alb  3.6  /  TBili  0.4  /  DBili  x   /  AST  21  /  ALT  19  /  AlkPhos  89  05-11          MICROBIOLOGY:  5/10 Tissue 3 L fifth MT: ngtd  5/10 Tissue 4 L fifth MT: ngtd  5/10 Tissue 2 L first MT: ngtd  5/10 Surgical swab 1 L first MT: ngtd    RADIOLOGY & ADDITIONAL STUDIES:

## 2022-05-11 NOTE — DISCHARGE NOTE PROVIDER - CARE PROVIDER_API CALL
Mariano Kline (DPSTEPHANI)  Podiatric Medicine and Surgery  [address], [address]  [place] [address]  Phone: [phone]  Fax: [phone]  Follow Up Time: 1 week    Elizabeth Corey)  Infectious Disease; Internal Medicine  [address], [address] [place] [address]  Phone: [phone]  Fax: [phone]  Follow Up Time: 1 week

## 2022-05-11 NOTE — PROGRESS NOTE ADULT - PROBLEM SELECTOR PLAN 4
Patient with a history aortic valve stenosis s/p open AVR in 2012.  - Continue with medication as above  - Patient is unsure of EF or diagnosis of HF, however is on entresto, will continue this home medication while inpatient Patient with a history aortic valve stenosis s/p open AVR in 2012.  - Continue with medication as above  - Patient is unsure of EF or diagnosis of HF, however is on entresto, will continue this home medication while inpatient  - holding home lasix 20mg PO qd

## 2022-05-11 NOTE — DISCHARGE NOTE PROVIDER - NSDCFUADDAPPT_GEN_ALL_CORE_FT
(1) You have an appointment scheduled with Dr. Kline on Monday 5/16 at 1PM.     (2) You should follow up with Dr. Corey in 1 weeks ([address] [place] [address], [phone]), ID office will call you to schedule the appointment. You will also need bloodwork performed weekly: labs should include CMP, CBC, ESR, CRP, CK faxed to ID office at [phone]

## 2022-05-11 NOTE — DISCHARGE NOTE PROVIDER - HOSPITAL COURSE
#Discharge: do not delete    DOROTHY ALEXANDRE is a 56y Female with a past medical history of aortic regurgitation s/p AVR in 2012, CML on imatinib and allopurinol atrial fibrillation s/p ablation presents with osteomyelitis now s/p surgical resection of the left metatarsal bone and 5th proximal phalanx resection with clean margins. Started on ABx per ID recs, to continue for 6 weeks.     Problem List/Main Diagnoses (system-based):   #Osteomyelitis of ankle or foot, acute: Patient reports several months of pain in the foot and is now s/p left metatarsal bone and 5th proximal phalanx resection with clean margins. Per ID patient started on ABx 5/11. O/p podiatrist Dr. Liu (here at [facility])   - Dapto 900mg q24h + Cefepime 2g q8h x 6 weeks   - PICC team placed PICC 5/11  - restarted Xarelto per Podiatry recs  - surgical pathology pending at time of discharge, will be followed up by podiatry as patient has appointment Monday 5/16  - surgical Bx NGTD   - CK WNL     #Atrial fibrillation and flutter: Patient with a longstanding history of atrial fibrillation refractory to ablation. Patient was on amiodarone which was discontinued 2 weeks prior to OR. Patient also was on xarelto which was held for 2 days prior to OR as per cardiology recs, restarted 5/11 as inpatient  - Continued home Toprol 25 mg PO BID    #CML (chronic myelocytic leukemia): Continue home imatinib 300 mg PO Qd. Patient with macrocytic anemia (common in CML), , baseline Hgb as of late 2021 9, now with 8.8. there are no signs/symptoms of acute bled at this time. Heme consulted to approve imatinib. Note o/p oncologist Dr. Payne at [place].   - obtained approval for imatinib 300mg qd patient to restart medication day after discharge   - continued home allopurinol 100mg PO qd  - monitored CBC.    #H/O aortic valve stenosis: Patient with a history aortic valve stenosis s/p open AVR in 2012.  - Continued with medication as above  - Patient is unsure of EF or diagnosis of HF, however is on entresto, will continued this home medication while inpatient  - held home lasix 20mg PO qd, restarted at discharge     Inpatient treatment course:     Patient was discharged to: home    New medications: Daptomycin, cefepime  Changes to old medications: NONE   Medications that were stopped: NONE    Items to follow up as outpatient: (1) Follow up with Podiatry 5/16    (2) Follow up with Dr. Corey in 1 weeks ([address] [place] [address], [phone]), ID office will call you to schedule the appointment. You will also need blood work performed weekly: labs should include CMP, CBC, ESR, CRP, CK faxed to ID office at [phone]    Physical exam at the time of discharge:     General: NAD  HEENT: NC/AT; PERRL, anicteric sclera; MMM  Neck: supple w/o palpable nodularity  Cardiovascular: +S1/S2; no MRG; regular rate irregular rhythm   Respiratory: CTA B/L; no W/R/R  Gastrointestinal: soft, NT/ND; +BSx4  Extremities: WWP; no edema, clubbing or cyanosis, surgical dressing in place pratibha L foot   Vascular: 2+ radial, DP pulses B/L  Neurological: AAOx3; no focal deficits    LABS & STUDIES:   #Discharge: do not delete    DOROTHY ALEXANDRE is a 56y Female with a past medical history of aortic regurgitation s/p AVR in 2012, CML on imatinib and allopurinol atrial fibrillation s/p ablation presents with osteomyelitis now s/p surgical resection of the left metatarsal bone and 5th proximal phalanx resection with clean margins. Started on ABx per ID recs, to continue for 6 weeks.     Problem List/Main Diagnoses (system-based):   #Osteomyelitis of ankle or foot, acute: Patient reports several months of pain in the foot and is now s/p left metatarsal bone and 5th proximal phalanx resection with clean margins. Per ID patient started on ABx 5/11. O/p podiatrist Dr. Liu (here at [facility])   - Dapto 900mg q24h + Cefepime 2g q8h x 6 weeks   - PICC team placed PICC 5/11  - restarted Xarelto per Podiatry recs  - surgical pathology NGTD pending finalized result at time of discharge, will be followed up by podiatry as patient has appointment Monday 5/16  - surgical Bx NGTD   - CK WNL     #Atrial fibrillation and flutter: Patient with a longstanding history of atrial fibrillation refractory to ablation. Patient was on amiodarone which was discontinued 2 weeks prior to OR. Patient also was on xarelto which was held for 2 days prior to OR as per cardiology recs, restarted 5/11 as inpatient  - Continued home Toprol 25 mg PO BID    #CML (chronic myelocytic leukemia): Continue home imatinib 300 mg PO Qd. Patient with macrocytic anemia (common in CML), , baseline Hgb as of late 2021 9, now with 8.8. there are no signs/symptoms of acute bled at this time. Heme consulted to approve imatinib. Note o/p oncologist Dr. Payne at [place].   - obtained approval for imatinib 300mg qd patient to restart medication day after discharge   - continued home allopurinol 100mg PO qd  - monitored CBC.    #H/O aortic valve stenosis: Patient with a history aortic valve stenosis s/p open AVR in 2012.  - Continued with medication as above  - Patient is unsure of EF or diagnosis of HF, however is on entresto, will continued this home medication while inpatient  - held home lasix 20mg PO qd, restarted at discharge     Patient was discharged to: home    New medications: Daptomycin, cefepime  Changes to old medications: NONE   Medications that were stopped: NONE    Items to follow up as outpatient: (1) Follow up with Podiatry 5/16    (2) Follow up with Dr. Corey in 1 weeks ([address] [place] [address], [phone]), ID office will call you to schedule the appointment. You will also need blood work performed weekly: labs should include CMP, CBC, ESR, CRP, CK faxed to ID office at [phone]    Physical exam at the time of discharge:     General: NAD  HEENT: NC/AT; PERRL, anicteric sclera; MMM  Neck: supple w/o palpable nodularity  Cardiovascular: +S1/S2; no MRG; regular rate irregular rhythm   Respiratory: CTA B/L; no W/R/R  Gastrointestinal: soft, NT/ND; +BSx4  Extremities: WWP; no edema, clubbing or cyanosis, surgical dressing in place pratibha L foot   Vascular: 2+ radial, DP pulses B/L  Neurological: AAOx3; no focal deficits    LABS & STUDIES:

## 2022-05-11 NOTE — PROGRESS NOTE ADULT - PROBLEM SELECTOR PLAN 1
Patient reports several months of pain in the foot and is now s/p left metatarsal bone and 5th proximal phalanx resection with clean margins. Per ID patient started on ABx 5/11. O/p podiatrist Dr. Liu (here at [facility])   - Dapto 900mg q24h + Cefepime 2g q8h x 6 weeks   - PICC team consulted  - restarted Xarelto per Podiatry recs  - f/u surgical pathology   - surgical Bx NGTD   - f/u CK added on to AM labs 5/11  - Will check A1c with AM labs to test for DM

## 2022-05-11 NOTE — DISCHARGE NOTE PROVIDER - NSDCFUSCHEDAPPT_GEN_ALL_CORE_FT
Koffi Nava  [facility]  [address]  Scheduled Appointment: 05/16/2022    uGme Nolen  [facility]  [address]  Scheduled Appointment: 06/21/2022     Koffi Nava  [facility]  [address]  Scheduled Appointment: 05/16/2022    Elizabeth Corey  [facility]  [address]  Scheduled Appointment: 05/17/2022    Guem Nolen  [facility]  [address]  Scheduled Appointment: 06/21/2022     Elizabeth Corey  [facility]  [address]  Scheduled Appointment: 05/17/2022    Koffi Nava  [facility]  [address]  Scheduled Appointment: 05/19/2022    Gume Nolen  [facility]  [address]  Scheduled Appointment: 06/21/2022

## 2022-05-11 NOTE — PROGRESS NOTE ADULT - PROBLEM SELECTOR PLAN 3
Continue home imatinib 300 mg PO Qd. Patient with macrocytic anemia (common in CML), , baseline Hgb as of late 2021 9, now with 8.8. there are no signs/symptoms of acute bled at this time. Heme consulted to approve imatinib. Note o/p oncologist Dr. Payne at [place].   - obtain approval for imatinib inpatient   - allopurinol 100mg PO qd  - c/t monitor CBC Continue home imatinib 300 mg PO Qd. Patient with macrocytic anemia (common in CML), , baseline Hgb as of late 2021 9, now with 8.8. there are no signs/symptoms of acute bled at this time. Heme consulted to approve imatinib. Note o/p oncologist Dr. Payne at [place].   - obtain approval for imatinib 300mg qd inpatient   - allopurinol 100mg PO qd  - c/t monitor CBC

## 2022-05-11 NOTE — PROGRESS NOTE ADULT - PROBLEM SELECTOR PLAN 2
Patient with a longstanding history of atrial fibrillation refractory to ablation. Patient was on amiodarone which was discontinued 2 weeks prior to OR. Patient also was on xarelto which was held for 2 days prior to OR as per cardiology recs, restarted 5/11 as inpatient  - Continue home Toprol 25 mg PO BID  - f/u EKG

## 2022-05-11 NOTE — DISCHARGE NOTE PROVIDER - NSDCCPCAREPLAN_GEN_ALL_CORE_FT
PRINCIPAL DISCHARGE DIAGNOSIS  Diagnosis: Acute osteomyelitis  Assessment and Plan of Treatment: Osteomyelitis is an infection of the bone. It can cause pain and other symptoms.  A bone can get infected if there are germs in the blood or nearby tissues. A bone can also get infected following a serious injury that exposes the bone. The main symptom is dull pain in the infected body part. The infected area might also be tender, warm, red, or swollen, but often pain is the only symptom. In some cases, osteomyelitis causes no symptoms.  You underwent surgery to remove dead and damaged bone and tissue. you will now receive antibiotic medicines for at least 6 weeks. These are usually given through a tube that goes into a vein, called in "intravenous catheter." Treatment usually starts while the person is in the hospital. Then, after the person is released, they must keep getting antibiotic treatments. This can happen at a rehab facility or at home with the help of a visiting nurse. You will be continued on two antibiotics (Daptomycin and Cefepime), they will be administered via your PICC line and social work set up skiled nurses to monitor your PICC and administer you antibiotics.   Please follow up with y our outpatient podiatrist and infectious disease (details for appointment are contained in this discahrge packet).      SECONDARY DISCHARGE DIAGNOSES  Diagnosis: CML (chronic myelocytic leukemia)  Assessment and Plan of Treatment: Your Imitanib was held whiley ou were admitted to hospital (you did not receive it) because our heme/onc team noted well maintained white blood cell counts. In the setting of recent procedure with some blood loss the decision was made to hold the medication while you remained here in the hopsital. You may restart the medication the day after youa re discahrged.

## 2022-05-11 NOTE — PHYSICAL THERAPY INITIAL EVALUATION ADULT - ADDITIONAL COMMENTS
Pt lives in a private home with her family, +stairs to enter, independent with all mobility prior to admission, no dme.

## 2022-05-11 NOTE — PROGRESS NOTE ADULT - ATTENDING COMMENTS
55F with PMH of aortic regurgitation a/p AVR, atrial fibrillation on xarelto and CML presenting post-operatively from a left metatarsal resection, and admitted for placement of PICC line and antibiotics.     - appreciate ID recs - starting daptomycin and cefepime  - follow up pathology

## 2022-05-11 NOTE — CONSULT NOTE ADULT - SUBJECTIVE AND OBJECTIVE BOX
55F h/o Afib/aflutter s/p ablation 2/17/21, AS s/p AVR (2012 at [place]), cystic kidney disease, CML. c.diff diarrhea in 3/2021, L foot bunion c/b 1st and 5th toe OM p/w surgical debridement of OM of foot.  Now s/p L foot 1st MT bone resection, 5th proximal phalanx resection on 5/10. ID recommending single lumen PICC placement for antibiotics. IR consulted for procedure.     Clinical History: L97.502    Handoff    MEWS Score    Afib    Atrial fibrillation and flutter    Obesity    Congenital polycystic kidney    H/O aortic valve stenosis    CML (chronic myelocytic leukemia)    Acute osteomyelitis    Acute osteomyelitis    Acute osteomyelitis    Osteomyelitis of ankle or foot, acute    Afib    Atrial fibrillation and flutter    CML (chronic myelocytic leukemia)    H/O aortic valve stenosis    Nutrition, metabolism, and development symptoms    Resection, metatarsal bone    H/O aortic valve replacement    History of gastric surgery    History of cardiac radiofrequency ablation    [email or web address]        Meds:acetaminophen     Tablet .. 650 milliGRAM(s) Oral every 6 hours PRN  allopurinol 100 milliGRAM(s) Oral at bedtime  aluminum hydroxide/magnesium hydroxide/simethicone Suspension 30 milliLiter(s) Oral every 4 hours PRN  cefepime   IVPB 2000 milliGRAM(s) IV Intermittent every 8 hours  DAPTOmycin IVPB 900 milliGRAM(s) IV Intermittent every 24 hours  furosemide    Tablet 20 milliGRAM(s) Oral <User Schedule>  imatinib 300 milliGRAM(s) Oral daily  melatonin 3 milliGRAM(s) Oral at bedtime PRN  metoprolol succinate ER 25 milliGRAM(s) Oral two times a day  ondansetron Injectable 4 milliGRAM(s) IV Push every 8 hours PRN  sacubitril 24 mG/valsartan 26 mG 1 Tablet(s) Oral two times a day      Allergies:penicillin (Unknown)        Labs:                           8.8    6.20  )-----------( 188      ( 11 May 2022 05:51 )             28.4       05-11    139  |  106  |  20  ----------------------------<  102<H>  4.6   |  25  |  1.09    Ca    8.4      11 May 2022 05:51  Phos  4.1     05-11  Mg     2.3     05-11    TPro  6.0  /  Alb  3.6  /  TBili  0.4  /  DBili  x   /  AST  21  /  ALT  19  /  AlkPhos  89  05-11

## 2022-05-12 ENCOUNTER — TRANSCRIPTION ENCOUNTER (OUTPATIENT)
Age: 57
End: 2022-05-12

## 2022-05-12 VITALS
RESPIRATION RATE: 18 BRPM | HEART RATE: 96 BPM | TEMPERATURE: 98 F | OXYGEN SATURATION: 98 % | SYSTOLIC BLOOD PRESSURE: 101 MMHG | DIASTOLIC BLOOD PRESSURE: 66 MMHG

## 2022-05-12 PROBLEM — C92.10 CHRONIC MYELOID LEUKEMIA, BCR/ABL-POSITIVE, NOT HAVING ACHIEVED REMISSION: Chronic | Status: ACTIVE | Noted: 2022-05-10

## 2022-05-12 LAB
ALBUMIN SERPL ELPH-MCNC: 3.3 G/DL — SIGNIFICANT CHANGE UP (ref 3.3–5)
ALP SERPL-CCNC: 80 U/L — SIGNIFICANT CHANGE UP (ref 40–120)
ALT FLD-CCNC: 16 U/L — SIGNIFICANT CHANGE UP (ref 10–45)
ANION GAP SERPL CALC-SCNC: 8 MMOL/L — SIGNIFICANT CHANGE UP (ref 5–17)
AST SERPL-CCNC: 17 U/L — SIGNIFICANT CHANGE UP (ref 10–40)
BASOPHILS # BLD AUTO: 0.01 K/UL — SIGNIFICANT CHANGE UP (ref 0–0.2)
BASOPHILS NFR BLD AUTO: 0.2 % — SIGNIFICANT CHANGE UP (ref 0–2)
BILIRUB SERPL-MCNC: 0.4 MG/DL — SIGNIFICANT CHANGE UP (ref 0.2–1.2)
BLD GP AB SCN SERPL QL: NEGATIVE — SIGNIFICANT CHANGE UP
BUN SERPL-MCNC: 16 MG/DL — SIGNIFICANT CHANGE UP (ref 7–23)
CALCIUM SERPL-MCNC: 8.2 MG/DL — LOW (ref 8.4–10.5)
CHLORIDE SERPL-SCNC: 102 MMOL/L — SIGNIFICANT CHANGE UP (ref 96–108)
CO2 SERPL-SCNC: 25 MMOL/L — SIGNIFICANT CHANGE UP (ref 22–31)
CREAT SERPL-MCNC: 0.96 MG/DL — SIGNIFICANT CHANGE UP (ref 0.5–1.3)
EGFR: 69 ML/MIN/1.73M2 — SIGNIFICANT CHANGE UP
EOSINOPHIL # BLD AUTO: 0.12 K/UL — SIGNIFICANT CHANGE UP (ref 0–0.5)
EOSINOPHIL NFR BLD AUTO: 2.5 % — SIGNIFICANT CHANGE UP (ref 0–6)
GLUCOSE SERPL-MCNC: 104 MG/DL — HIGH (ref 70–99)
HCT VFR BLD CALC: 26.1 % — LOW (ref 34.5–45)
HGB BLD-MCNC: 8.2 G/DL — LOW (ref 11.5–15.5)
IMM GRANULOCYTES NFR BLD AUTO: 0.4 % — SIGNIFICANT CHANGE UP (ref 0–1.5)
LYMPHOCYTES # BLD AUTO: 1.11 K/UL — SIGNIFICANT CHANGE UP (ref 1–3.3)
LYMPHOCYTES # BLD AUTO: 23.5 % — SIGNIFICANT CHANGE UP (ref 13–44)
MAGNESIUM SERPL-MCNC: 2.2 MG/DL — SIGNIFICANT CHANGE UP (ref 1.6–2.6)
MCHC RBC-ENTMCNC: 31.4 GM/DL — LOW (ref 32–36)
MCHC RBC-ENTMCNC: 33.9 PG — SIGNIFICANT CHANGE UP (ref 27–34)
MCV RBC AUTO: 107.9 FL — HIGH (ref 80–100)
MONOCYTES # BLD AUTO: 0.61 K/UL — SIGNIFICANT CHANGE UP (ref 0–0.9)
MONOCYTES NFR BLD AUTO: 12.9 % — SIGNIFICANT CHANGE UP (ref 2–14)
NEUTROPHILS # BLD AUTO: 2.86 K/UL — SIGNIFICANT CHANGE UP (ref 1.8–7.4)
NEUTROPHILS NFR BLD AUTO: 60.5 % — SIGNIFICANT CHANGE UP (ref 43–77)
NRBC # BLD: 0 /100 WBCS — SIGNIFICANT CHANGE UP (ref 0–0)
PHOSPHATE SERPL-MCNC: 3.6 MG/DL — SIGNIFICANT CHANGE UP (ref 2.5–4.5)
PLATELET # BLD AUTO: 164 K/UL — SIGNIFICANT CHANGE UP (ref 150–400)
POTASSIUM SERPL-MCNC: 3.9 MMOL/L — SIGNIFICANT CHANGE UP (ref 3.5–5.3)
POTASSIUM SERPL-SCNC: 3.9 MMOL/L — SIGNIFICANT CHANGE UP (ref 3.5–5.3)
PROT SERPL-MCNC: 5.8 G/DL — LOW (ref 6–8.3)
RBC # BLD: 2.42 M/UL — LOW (ref 3.8–5.2)
RBC # FLD: 14.6 % — HIGH (ref 10.3–14.5)
RH IG SCN BLD-IMP: POSITIVE — SIGNIFICANT CHANGE UP
SODIUM SERPL-SCNC: 135 MMOL/L — SIGNIFICANT CHANGE UP (ref 135–145)
WBC # BLD: 4.73 K/UL — SIGNIFICANT CHANGE UP (ref 3.8–10.5)
WBC # FLD AUTO: 4.73 K/UL — SIGNIFICANT CHANGE UP (ref 3.8–10.5)

## 2022-05-12 PROCEDURE — 88311 DECALCIFY TISSUE: CPT

## 2022-05-12 PROCEDURE — 73630 X-RAY EXAM OF FOOT: CPT

## 2022-05-12 PROCEDURE — 87186 SC STD MICRODIL/AGAR DIL: CPT

## 2022-05-12 PROCEDURE — 85025 COMPLETE CBC W/AUTO DIFF WBC: CPT

## 2022-05-12 PROCEDURE — 93005 ELECTROCARDIOGRAM TRACING: CPT

## 2022-05-12 PROCEDURE — 86900 BLOOD TYPING SEROLOGIC ABO: CPT

## 2022-05-12 PROCEDURE — 82550 ASSAY OF CK (CPK): CPT

## 2022-05-12 PROCEDURE — 83735 ASSAY OF MAGNESIUM: CPT

## 2022-05-12 PROCEDURE — 87075 CULTR BACTERIA EXCEPT BLOOD: CPT

## 2022-05-12 PROCEDURE — 86901 BLOOD TYPING SEROLOGIC RH(D): CPT

## 2022-05-12 PROCEDURE — 99233 SBSQ HOSP IP/OBS HIGH 50: CPT

## 2022-05-12 PROCEDURE — 84100 ASSAY OF PHOSPHORUS: CPT

## 2022-05-12 PROCEDURE — C1751: CPT

## 2022-05-12 PROCEDURE — 80053 COMPREHEN METABOLIC PANEL: CPT

## 2022-05-12 PROCEDURE — 88304 TISSUE EXAM BY PATHOLOGIST: CPT

## 2022-05-12 PROCEDURE — 36573 INSJ PICC RS&I 5 YR+: CPT

## 2022-05-12 PROCEDURE — 36415 COLL VENOUS BLD VENIPUNCTURE: CPT

## 2022-05-12 PROCEDURE — 88305 TISSUE EXAM BY PATHOLOGIST: CPT

## 2022-05-12 PROCEDURE — 97161 PT EVAL LOW COMPLEX 20 MIN: CPT

## 2022-05-12 PROCEDURE — 87070 CULTURE OTHR SPECIMN AEROBIC: CPT

## 2022-05-12 PROCEDURE — 86850 RBC ANTIBODY SCREEN: CPT

## 2022-05-12 RX ADMIN — Medication 25 MILLIGRAM(S): at 06:45

## 2022-05-12 RX ADMIN — CEFEPIME 100 MILLIGRAM(S): 1 INJECTION, POWDER, FOR SOLUTION INTRAMUSCULAR; INTRAVENOUS at 01:19

## 2022-05-12 RX ADMIN — SACUBITRIL AND VALSARTAN 1 TABLET(S): 24; 26 TABLET, FILM COATED ORAL at 06:46

## 2022-05-12 RX ADMIN — Medication 650 MILLIGRAM(S): at 11:09

## 2022-05-12 RX ADMIN — CEFEPIME 100 MILLIGRAM(S): 1 INJECTION, POWDER, FOR SOLUTION INTRAMUSCULAR; INTRAVENOUS at 09:36

## 2022-05-12 NOTE — DISCHARGE NOTE NURSING/CASE MANAGEMENT/SOCIAL WORK - NSDCFUADDAPPT_GEN_ALL_CORE_FT
(1) You have an appointment scheduled with Dr. Kline on Monday 5/16 at 1PM.     (2) You should follow up with Dr. Corey in 1 weeks ([address] [place] [address], [phone]), ID office will call you to schedule the appointment. You will also need bloodwork performed weekly: labs should include CMP, CBC, ESR, CRP, CK faxed to ID office at [phone]

## 2022-05-12 NOTE — DISCHARGE NOTE NURSING/CASE MANAGEMENT/SOCIAL WORK - PATIENT PORTAL LINK FT
You can access the FollowMyHealth Patient Portal offered by [facility] by registering at the following website: [email or web address]. By joining [employer]’s FollowMyHealth portal, you will also be able to view your health information using other applications (apps) compatible with our system.

## 2022-05-12 NOTE — PROGRESS NOTE ADULT - ASSESSMENT
55F PMHx valvular heart disease (AVR 2012), CML, AFib presenting s/p L foot bone resection of 1st metatarsal and 5th toe. Pt w/ h/o chronic ulcerations w/ worsening noted and concerns for osteomyelitis. Pt initially delayed medical care 2/2 Islam holidays. Pt seen by Dr. Kline (podiatry) outpt & underwent work-up w/ MRI & x-rays showing concerns for osteomyelitis of 1st metatarsal and 5th toe phalanx. Consulted w/ Dr. Corey from ID for assistance in management of abx. Pt recommended for admission for ongoing care s/p sx.    P:  -Hold AC  -IVF  -Pain management  -DSD  -F/u w/ Dr. Corey (ID) in AM  -F/u clean margin bx/cx  -Plan d/w attending
55F PMHx valvular heart disease (AVR 2012), CML, AFib presenting s/p L foot bone resection of 1st metatarsal and 5th toe. Pt w/ h/o chronic ulcerations w/ worsening noted and concerns for osteomyelitis. Pt initially delayed medical care 2/2 [employer] witness holidays. Pt seen by Dr. Kline (podiatry) outpt & underwent work-up w/ MRI & x-rays showing concerns for osteomyelitis of 1st metatarsal and 5th toe phalanx. Consulted w/ Dr. Corey from ID for assistance in management of abx. Pt recommended for admission for ongoing care s/p sx.    P:  -IVF  -Pain management  -DSD  -F/u w/ Dr. Corey (ID)   -F/u clean margin bx/cx  -Plan d/w attending
55F PMHx valvular heart disease (AVR 2012), CML, AFib presenting s/p L foot bone resection of 1st metatarsal and 5th toe. Pt w/ h/o chronic ulcerations w/ worsening noted and concerns for osteomyelitis. Pt initially delayed medical care 2/2 Jewish holidays. Pt seen by Dr. Kline (podiatry) outpt & underwent work-up w/ MRI & x-rays showing concerns for osteomyelitis of 1st metatarsal and 5th toe phalanx. Consulted w/ Dr. Corey from ID for assistance in management of abx. Pt recommended for admission for ongoing care s/p sx.    P:  -DSD  -ID recs appreciated  -Keep dressing c/d/i until follow-up next week  -Plan d/w attending    Patient should follow up with Dr. Mariano Kline within 1 week of discharge.    Office information:          [place] Address- [address] [place] [address] Phone: [phone]         [place] Address- [address], [place] [address] Phone: [phone]  
55F PMH aortic regurgitation s/p AVR in 2012, CML on imatinib and allopurinol atrial fibrillation s/p ablation presents with osteomyelitis now s/p surgical resection of the left metatarsal bone and 5th proximal phalanx resection with clean margins. Started on ABx per ID recs, awaiting PICC.

## 2022-05-12 NOTE — PROGRESS NOTE ADULT - SUBJECTIVE AND OBJECTIVE BOX
Patient is a 56y old  Female who presents with a chief complaint of Osteomyelitis (10 May 2022 20:15)      INTERVAL HPI/ OVERNIGHT EVENTS: Pt seen and examined bedside. S/p partial bone resection.      LABS                        8.8    6.20  )-----------( 188      ( 11 May 2022 05:51 )             28.4     05-11    139  |  106  |  20  ----------------------------<  102<H>  4.6   |  25  |  1.09    Ca    8.4      11 May 2022 05:51  Phos  4.1     05-11  Mg     2.3     05-11    TPro  6.0  /  Alb  3.6  /  TBili  0.4  /  DBili  x   /  AST  21  /  ALT  19  /  AlkPhos  89  05-11    ICU Vital Signs Last 24 Hrs  T(C): 37.3 (11 May 2022 05:45), Max: 37.3 (11 May 2022 05:45)  T(F): 99.2 (11 May 2022 05:45), Max: 99.2 (11 May 2022 05:45)  HR: 94 (11 May 2022 05:45) (86 - 94)  BP: 101/65 (11 May 2022 05:45) (98/66 - 126/71)  BP(mean): 77 (10 May 2022 20:30) (77 - 92)  ABP: --  ABP(mean): --  RR: 19 (11 May 2022 05:45) (15 - 21)  SpO2: 95% (11 May 2022 05:45) (95% - 100%)    RADIOLOGY  Pending    MICROBIOLOGY  Culture - Tissue with Gram Stain (05.10.22 @ 20:42)    Specimen Source: .Tissue 2. Left first Metatarsal    Culture Results:   No growth to date    PHYSICAL EXAM  Lower Extremity Focused  Vasc: Extremity WWP. DP/PT 2/4 B/L  Derm: Sutures intact on dorsomedial left 1st mpj and 5th toe  Neuro: Sensation grossly diminished  MSK: Normal muscle strength and tone
POST OP NOTE    DOROTHY ALEXANDRE  MRN-[id number]    Procedure: L foot bone resection  Surgeon: CARA Kline  Assistants: Curtis, PGY-3    Patient tolerated procedure well without incident.  Patient transferred to PACU in stable condition.       PE / Post Op Check:       GEN: NAD, AAOx3, resting comfortably with pain controlled      LE Focused: CFT shows adequate perfusion b/l with no signs of ischemic compromise.  No strikethrough is appreciated on surgical dressings.  Dressings were dry, clean, and intact.  No neuromuscular deficits appreciated.        
Patient is a 56y old  Female who presents with a chief complaint of Osteomyelitis (11 May 2022 14:51)      INTERVAL HPI/ OVERNIGHT EVENTS      LABS                        8.2    4.73  )-----------( 164      ( 12 May 2022 07:15 )             26.1     05-12    135  |  102  |  16  ----------------------------<  104<H>  3.9   |  25  |  0.96    Ca    8.2<L>      12 May 2022 07:15  Phos  3.6     05-12  Mg     2.2     05-12    TPro  5.8<L>  /  Alb  3.3  /  TBili  0.4  /  DBili  x   /  AST  17  /  ALT  16  /  AlkPhos  80  05-12        ICU Vital Signs Last 24 Hrs  T(C): 36.7 (12 May 2022 06:20), Max: 37.2 (11 May 2022 12:29)  T(F): 98 (12 May 2022 06:20), Max: 98.9 (11 May 2022 12:29)  HR: 96 (12 May 2022 06:20) (92 - 96)  BP: 101/66 (12 May 2022 06:20) (100/64 - 101/66)  BP(mean): --  ABP: --  ABP(mean): --  RR: 18 (12 May 2022 06:20) (18 - 19)  SpO2: 98% (12 May 2022 06:20) (96% - 100%)    RADIOLOGY    MICROBIOLOGY  Culture - Surgical Swab (05.10.22 @ 20:41)    Gram Stain:   No organisms seen  No WBC's seen.    Specimen Source: .Surgical Swab 1. Left first Metatarsal    Culture Results:   No growth to date    Culture - Tissue with Gram Stain (05.10.22 @ 20:42)    Gram Stain:   No organisms seen  Rare WBC's    Specimen Source: .Tissue 2. Left first Metatarsal    Culture Results:   No growth to date    Culture - Tissue with Gram Stain (05.10.22 @ 20:43)    Gram Stain:   No organisms seen  Rare WBC's    Specimen Source: .Tissue 4. Left Fifth Metatarsal    Culture Results:     Culture - Tissue with Gram Stain (05.10.22 @ 20:44)    Gram Stain:   No organisms seen  Few WBC's    Specimen Source: .Tissue 3. Left Fifth Metatarsal    Culture Results:   No growth to date    PHYSICAL EXAM  Lower Extremity Focused  Vasc: Extremity WWP. DP/PT 2/4 B/L  Derm: Sutures intact on dorsomedial left 1st mpj and 5th toe  Neuro: Sensation grossly diminished  MSK: Normal muscle strength and tone
OVERNIGHT EVENTS: CARIE    SUBJECTIVE / INTERVAL HPI: Patient seen and examined at bedside.     VITAL SIGNS:  Vital Signs Last 24 Hrs  T(C): 37.3 (11 May 2022 05:45), Max: 37.3 (11 May 2022 05:45)  T(F): 99.2 (11 May 2022 05:45), Max: 99.2 (11 May 2022 05:45)  HR: 94 (11 May 2022 05:45) (86 - 94)  BP: 101/65 (11 May 2022 05:45) (98/66 - 126/71)  BP(mean): 77 (10 May 2022 20:30) (77 - 92)  RR: 19 (11 May 2022 05:45) (15 - 21)  SpO2: 95% (11 May 2022 05:45) (95% - 100%)    PHYSICAL EXAM:    General: NAD  HEENT: NC/AT; PERRL, anicteric sclera; MMM  Neck: supple w/o palpable nodularity  Cardiovascular: +S1/S2; no MRG; regular rate irregular rhythm   Respiratory: CTA B/L; no W/R/R  Gastrointestinal: soft, NT/ND; +BSx4  Extremities: WWP; no edema, clubbing or cyanosis, surgical dressing in place pratibha L foot   Vascular: 2+ radial, DP pulses B/L  Neurological: AAOx3; no focal deficits    MEDICATIONS:  MEDICATIONS  (STANDING):  allopurinol 100 milliGRAM(s) Oral at bedtime  cefepime   IVPB 2000 milliGRAM(s) IV Intermittent every 8 hours  DAPTOmycin IVPB 900 milliGRAM(s) IV Intermittent every 24 hours  furosemide    Tablet 20 milliGRAM(s) Oral <User Schedule>  imatinib 300 milliGRAM(s) Oral daily  metoprolol succinate ER 25 milliGRAM(s) Oral two times a day  rivaroxaban 20 milliGRAM(s) Oral with dinner  sacubitril 24 mG/valsartan 26 mG 1 Tablet(s) Oral two times a day    MEDICATIONS  (PRN):  acetaminophen     Tablet .. 650 milliGRAM(s) Oral every 6 hours PRN Mild Pain (1 - 3)  aluminum hydroxide/magnesium hydroxide/simethicone Suspension 30 milliLiter(s) Oral every 4 hours PRN Dyspepsia  melatonin 3 milliGRAM(s) Oral at bedtime PRN Insomnia  ondansetron Injectable 4 milliGRAM(s) IV Push every 8 hours PRN Nausea and/or Vomiting      ALLERGIES:  Allergies    penicillin (Unknown)    Intolerances        LABS:                        8.8    6.20  )-----------( 188      ( 11 May 2022 05:51 )             28.4     05-11    139  |  106  |  20  ----------------------------<  102<H>  4.6   |  25  |  1.09    Ca    8.4      11 May 2022 05:51  Phos  4.1     05-11  Mg     2.3     05-11    TPro  6.0  /  Alb  3.6  /  TBili  0.4  /  DBili  x   /  AST  21  /  ALT  19  /  AlkPhos  89  05-11        CAPILLARY BLOOD GLUCOSE          RADIOLOGY & ADDITIONAL TESTS: Reviewed.

## 2022-05-15 LAB
-  AMPICILLIN/SULBACTAM: SIGNIFICANT CHANGE UP
-  AMPICILLIN: SIGNIFICANT CHANGE UP
-  CEFAZOLIN: SIGNIFICANT CHANGE UP
-  CEFTRIAXONE: SIGNIFICANT CHANGE UP
-  CIPROFLOXACIN: SIGNIFICANT CHANGE UP
-  CLINDAMYCIN: SIGNIFICANT CHANGE UP
-  DAPTOMYCIN: SIGNIFICANT CHANGE UP
-  ERYTHROMYCIN: SIGNIFICANT CHANGE UP
-  GENTAMICIN: SIGNIFICANT CHANGE UP
-  LEVOFLOXACIN: SIGNIFICANT CHANGE UP
-  LINEZOLID: SIGNIFICANT CHANGE UP
-  MEROPENEM: SIGNIFICANT CHANGE UP
-  MOXIFLOXACIN: SIGNIFICANT CHANGE UP
-  OXACILLIN: SIGNIFICANT CHANGE UP
-  RIFAMPIN: SIGNIFICANT CHANGE UP
-  TETRACYCLINE: SIGNIFICANT CHANGE UP
-  TRIMETHOPRIM/SULFAMETHOXAZOLE: SIGNIFICANT CHANGE UP
-  VANCOMYCIN: SIGNIFICANT CHANGE UP
CULTURE RESULTS: NO GROWTH — SIGNIFICANT CHANGE UP
CULTURE RESULTS: SIGNIFICANT CHANGE UP
METHOD TYPE: SIGNIFICANT CHANGE UP
ORGANISM # SPEC MICROSCOPIC CNT: SIGNIFICANT CHANGE UP
ORGANISM # SPEC MICROSCOPIC CNT: SIGNIFICANT CHANGE UP
SPECIMEN SOURCE: SIGNIFICANT CHANGE UP
SPECIMEN SOURCE: SIGNIFICANT CHANGE UP

## 2022-05-17 ENCOUNTER — APPOINTMENT (OUTPATIENT)
Dept: INFECTIOUS DISEASE | Facility: CLINIC | Age: 57
End: 2022-05-17
Payer: COMMERCIAL

## 2022-05-17 ENCOUNTER — TRANSCRIPTION ENCOUNTER (OUTPATIENT)
Age: 57
End: 2022-05-17

## 2022-05-17 DIAGNOSIS — B37.3 CANDIDIASIS OF VULVA AND VAGINA: ICD-10-CM

## 2022-05-17 LAB
-  CEFAZOLIN: SIGNIFICANT CHANGE UP
-  CEFAZOLIN: SIGNIFICANT CHANGE UP
-  CLINDAMYCIN: SIGNIFICANT CHANGE UP
-  CLINDAMYCIN: SIGNIFICANT CHANGE UP
-  DAPTOMYCIN: SIGNIFICANT CHANGE UP
-  DAPTOMYCIN: SIGNIFICANT CHANGE UP
-  ERYTHROMYCIN: SIGNIFICANT CHANGE UP
-  ERYTHROMYCIN: SIGNIFICANT CHANGE UP
-  LINEZOLID: SIGNIFICANT CHANGE UP
-  LINEZOLID: SIGNIFICANT CHANGE UP
-  OXACILLIN: SIGNIFICANT CHANGE UP
-  OXACILLIN: SIGNIFICANT CHANGE UP
-  RIFAMPIN: SIGNIFICANT CHANGE UP
-  RIFAMPIN: SIGNIFICANT CHANGE UP
-  TRIMETHOPRIM/SULFAMETHOXAZOLE: SIGNIFICANT CHANGE UP
-  TRIMETHOPRIM/SULFAMETHOXAZOLE: SIGNIFICANT CHANGE UP
-  VANCOMYCIN: SIGNIFICANT CHANGE UP
-  VANCOMYCIN: SIGNIFICANT CHANGE UP
CULTURE RESULTS: SIGNIFICANT CHANGE UP
METHOD TYPE: SIGNIFICANT CHANGE UP
METHOD TYPE: SIGNIFICANT CHANGE UP
ORGANISM # SPEC MICROSCOPIC CNT: SIGNIFICANT CHANGE UP
SPECIMEN SOURCE: SIGNIFICANT CHANGE UP
SURGICAL PATHOLOGY STUDY: SIGNIFICANT CHANGE UP

## 2022-05-17 PROCEDURE — 99203 OFFICE O/P NEW LOW 30 MIN: CPT | Mod: 95

## 2022-05-17 PROCEDURE — 99213 OFFICE O/P EST LOW 20 MIN: CPT | Mod: 95

## 2022-05-17 RX ORDER — DAPTOMYCIN 350 MG/7ML
INJECTION, POWDER, LYOPHILIZED, FOR SOLUTION INTRAVENOUS
Refills: 0 | Status: ACTIVE | COMMUNITY

## 2022-05-17 NOTE — ASSESSMENT
[FreeTextEntry1] : 55F h/o Afib/aflutter s/p ablation 2/17/21, AS s/p AVR (2012 at [place]), cystic kidney disease, CML. c.diff diarrhea in 3/2021, L foot bunion c/b 1st and 5th toe OM s/p L foot 1st MT bone resection, 5th proximal phalanx resection on 5/10/22 currently on IV dapto/cefepime (5/11-6/21) p/w management of OM of 1st and 5th toe.  \par \par OR culture grew staph caprae and staph hominis, both S to daptomycin.  Staph caprae was R to dapto, which makes sense since she has been on/off doxycycline.  It is strange that staph hominis was S to oxacillin but I to cefazolin.  Will use dapto to be safe.  \par \par - cont daptomycin 900mg IV q24h\par - stop cefepime \par - Monistat 3 Rx sent for vaginal yeast infection \par - f/u bone pathology \par - Duration of antibiotics is 6 weeks ( 5/11 - 6/21)\par - f/u Dr. Bruce (podiatry)\par - RTC 3 weeks \par \par \par

## 2022-05-17 NOTE — DATA REVIEWED
[FreeTextEntry1] : --2022--\par \par 5/10 Tissue 3 L fifth MT: staph caprae (fluid media only): S to cefazo, dapto, linezo, Bact, vanco, R to doxy\par 5/10 Tissue 4 L fifth MT: staph hominis (fluid media only): S to linezo, dapto, ox, Bact, vanco, I to cefazolin; staph caprae (fluid media only) S to cefazo, linezolid, ox, bact, vanco\par 5/10 Tissue 2 L first MT: ngtd\par 5/10 Surgical swab 1 L first MT: ngtd\par \par Path: not available \par \par 5/11 X-ray \par Findings/\par impression: First metatarsal post surgical changes. Hallux valgus, first \par metatarsal/phalangeal degenerative changes, first metatarsal head \par sesamoid hypertrophic changes. Post partial resection fifth middle and \par fifth proximal phalanges.

## 2022-05-17 NOTE — REASON FOR VISIT
[Follow-Up: _____] : a [unfilled] follow-up visit [Home] : at home, [unfilled] , at the time of the visit. [Medical Office: ([facility])___] : at the medical office located in  [Verbal consent obtained from patient] : the patient, [unfilled] [FreeTextEntry1] : OM of foot

## 2022-05-17 NOTE — HISTORY OF PRESENT ILLNESS
[FreeTextEntry1] : 55F h/o Afib/aflutter s/p ablation 2/17/21, AS s/p AVR (2012 at [place]), cystic kidney disease, CML. c.diff diarrhea in 3/2021, L foot bunion c/b 1st and 5th toe OM s/p L foot 1st MT bone resection, 5th proximal phalanx resection on 5/10/22 currently on IV dapto/cefepime (5/11-6/21) p/w management of OM of 1st and 5th toe.  \par \par She has been having Bunion on L foot for a long time. About 5 months ago she noticed a crack on L bunion which got worse over time. The wound never healed but has discharge. Was seen by Dr. Kline and was told that she has OM and need abx. She was treated with doxycycline on and off for a while, also had keflex. She thinks she has been on/off abx for a few months but cannot tell exact duration. She thinks abx improved the wound but after stopping it the wound soon becomes worse. Denied fever/chills, n/v/d.   She initially refused to get admitted and wanted to have outpatient management.  She was seen by me on 5/5 and was recommended to be admitted after surgery for initiation of empiric IV abx, which she agreed.  She presented to [facility] on 5/10 for scheduled surgery and underwent L foot 1st MT bone resection, 5th proximal phalanx resection on 5/10.  Empiric IV dapto/cefepime was started on 5/11 and was discharged home with 6 weeks course.  The plan was to follow up OR culture as outpatient and adjust abx.\par \par Today patient reports feeling well, denied fever/chills, n/v/d, abdominal pain.  Reports vaginal yeast infection since the initiation of abx.  Otherwise tolerating abx.  She says she was Dr. Kline yesterday and he was happy with how the wound looks.  Denied discharge, has mild erythema around.\par

## 2022-05-17 NOTE — PHYSICAL EXAM
[General Appearance - Alert] : alert [General Appearance - In No Acute Distress] : in no acute distress [Sclera] : the sclera and conjunctiva were normal [Outer Ear] : the ears and nose were normal in appearance [Neck Appearance] : the appearance of the neck was normal [] : no respiratory distress [No Focal Deficits] : no focal deficits [FreeTextEntry1] : ttp at mid abdomen

## 2022-05-18 DIAGNOSIS — C92.10 CHRONIC MYELOID LEUKEMIA, BCR/ABL-POSITIVE, NOT HAVING ACHIEVED REMISSION: ICD-10-CM

## 2022-05-18 DIAGNOSIS — Q61.3 POLYCYSTIC KIDNEY, UNSPECIFIED: ICD-10-CM

## 2022-05-18 DIAGNOSIS — Z95.2 PRESENCE OF PROSTHETIC HEART VALVE: ICD-10-CM

## 2022-05-18 DIAGNOSIS — E66.9 OBESITY, UNSPECIFIED: ICD-10-CM

## 2022-05-18 DIAGNOSIS — I48.11 LONGSTANDING PERSISTENT ATRIAL FIBRILLATION: ICD-10-CM

## 2022-05-18 DIAGNOSIS — M86.172 OTHER ACUTE OSTEOMYELITIS, LEFT ANKLE AND FOOT: ICD-10-CM

## 2022-05-18 DIAGNOSIS — L97.529 NON-PRESSURE CHRONIC ULCER OF OTHER PART OF LEFT FOOT WITH UNSPECIFIED SEVERITY: ICD-10-CM

## 2022-05-18 DIAGNOSIS — D53.9 NUTRITIONAL ANEMIA, UNSPECIFIED: ICD-10-CM

## 2022-05-18 DIAGNOSIS — Z79.01 LONG TERM (CURRENT) USE OF ANTICOAGULANTS: ICD-10-CM

## 2022-05-19 ENCOUNTER — RX RENEWAL (OUTPATIENT)
Age: 57
End: 2022-05-19

## 2022-05-19 ENCOUNTER — APPOINTMENT (OUTPATIENT)
Dept: HEART AND VASCULAR | Facility: CLINIC | Age: 57
End: 2022-05-19
Payer: COMMERCIAL

## 2022-05-19 VITALS — WEIGHT: 250 LBS | BODY MASS INDEX: 41.6 KG/M2

## 2022-05-19 VITALS
DIASTOLIC BLOOD PRESSURE: 72 MMHG | OXYGEN SATURATION: 98 % | TEMPERATURE: 97.2 F | SYSTOLIC BLOOD PRESSURE: 100 MMHG | WEIGHT: 254 LBS | HEART RATE: 90 BPM | HEIGHT: 65 IN | BODY MASS INDEX: 42.32 KG/M2

## 2022-05-19 PROCEDURE — 99214 OFFICE O/P EST MOD 30 MIN: CPT | Mod: 25

## 2022-05-19 PROCEDURE — 93000 ELECTROCARDIOGRAM COMPLETE: CPT

## 2022-05-19 RX ORDER — CEFEPIME 100 G/1
INJECTION, POWDER, FOR SOLUTION INTRAVENOUS
Refills: 0 | Status: DISCONTINUED | COMMUNITY
End: 2022-05-19

## 2022-05-19 RX ORDER — AMIODARONE HYDROCHLORIDE 200 MG/1
200 TABLET ORAL
Qty: 90 | Refills: 1 | Status: DISCONTINUED | COMMUNITY
Start: 2021-11-24 | End: 2022-05-19

## 2022-05-19 RX ORDER — DOXYCLYCLINE HYCLATE 150 MG/1
TABLET, COATED ORAL
Refills: 0 | Status: DISCONTINUED | COMMUNITY
End: 2022-05-19

## 2022-05-19 NOTE — DISCUSSION/SUMMARY
[FreeTextEntry1] : EKG:AF\par taper Lasix and advised daily weights for CM - to call me in 2 weeks with her weights\par continue BB + entresto + Lasix for CHF/CM\par consider digoxin if palpitations increase; Xarelto +BB for AF

## 2022-05-19 NOTE — HISTORY OF PRESENT ILLNESS
[FreeTextEntry1] : has occasional palpitations at night- no increase since off amiodarone( off  it x 1 week- no cp/sob

## 2022-05-19 NOTE — PHYSICAL EXAM
[Well Developed] : well developed [Well Nourished] : well nourished [No Acute Distress] : no acute distress [Obese] : obese [Normal Conjunctiva] : normal conjunctiva [Normal Venous Pressure] : normal venous pressure [No Carotid Bruit] : no carotid bruit [Clear Lung Fields] : clear lung fields [Good Air Entry] : good air entry [No Respiratory Distress] : no respiratory distress  [Soft] : abdomen soft [Non Tender] : non-tender [Normal Gait] : normal gait [No Edema] : no edema [No Varicosities] : no varicosities [No Rash] : no rash [Moves all extremities] : moves all extremities [Alert and Oriented] : alert and oriented [de-identified] : irregular;soft systolic murmur

## 2022-05-23 ENCOUNTER — NON-APPOINTMENT (OUTPATIENT)
Age: 57
End: 2022-05-23

## 2022-05-23 ENCOUNTER — EMERGENCY (EMERGENCY)
Facility: HOSPITAL | Age: 57
LOS: 1 days | Discharge: HOME | End: 2022-05-23
Attending: EMERGENCY MEDICINE | Admitting: EMERGENCY MEDICINE
Payer: COMMERCIAL

## 2022-05-23 VITALS
WEIGHT: 250 LBS | DIASTOLIC BLOOD PRESSURE: 60 MMHG | HEART RATE: 93 BPM | RESPIRATION RATE: 18 BRPM | SYSTOLIC BLOOD PRESSURE: 91 MMHG | TEMPERATURE: 98 F | OXYGEN SATURATION: 98 % | HEIGHT: 64 IN

## 2022-05-23 VITALS
DIASTOLIC BLOOD PRESSURE: 77 MMHG | RESPIRATION RATE: 16 BRPM | HEART RATE: 91 BPM | OXYGEN SATURATION: 99 % | TEMPERATURE: 98 F | SYSTOLIC BLOOD PRESSURE: 128 MMHG

## 2022-05-23 DIAGNOSIS — Z79.01 LONG TERM (CURRENT) USE OF ANTICOAGULANTS: ICD-10-CM

## 2022-05-23 DIAGNOSIS — Z95.4 PRESENCE OF OTHER HEART-VALVE REPLACEMENT: ICD-10-CM

## 2022-05-23 DIAGNOSIS — Z87.19 PERSONAL HISTORY OF OTHER DISEASES OF THE DIGESTIVE SYSTEM: ICD-10-CM

## 2022-05-23 DIAGNOSIS — Z95.9 PRESENCE OF CARDIAC AND VASCULAR IMPLANT AND GRAFT, UNSPECIFIED: ICD-10-CM

## 2022-05-23 DIAGNOSIS — Z88.0 ALLERGY STATUS TO PENICILLIN: ICD-10-CM

## 2022-05-23 DIAGNOSIS — Z98.890 OTHER SPECIFIED POSTPROCEDURAL STATES: Chronic | ICD-10-CM

## 2022-05-23 DIAGNOSIS — E66.9 OBESITY, UNSPECIFIED: ICD-10-CM

## 2022-05-23 DIAGNOSIS — M79.601 PAIN IN RIGHT ARM: ICD-10-CM

## 2022-05-23 DIAGNOSIS — I48.91 UNSPECIFIED ATRIAL FIBRILLATION: ICD-10-CM

## 2022-05-23 DIAGNOSIS — I51.7 CARDIOMEGALY: ICD-10-CM

## 2022-05-23 DIAGNOSIS — C92.10 CHRONIC MYELOID LEUKEMIA, BCR/ABL-POSITIVE, NOT HAVING ACHIEVED REMISSION: ICD-10-CM

## 2022-05-23 DIAGNOSIS — Z20.822 CONTACT WITH AND (SUSPECTED) EXPOSURE TO COVID-19: ICD-10-CM

## 2022-05-23 DIAGNOSIS — Q61.3 POLYCYSTIC KIDNEY, UNSPECIFIED: ICD-10-CM

## 2022-05-23 DIAGNOSIS — Z95.2 PRESENCE OF PROSTHETIC HEART VALVE: Chronic | ICD-10-CM

## 2022-05-23 LAB
ALBUMIN SERPL ELPH-MCNC: 4.1 G/DL — SIGNIFICANT CHANGE UP (ref 3.3–5)
ALP SERPL-CCNC: 97 U/L — SIGNIFICANT CHANGE UP (ref 40–120)
ALT FLD-CCNC: 32 U/L — SIGNIFICANT CHANGE UP (ref 10–45)
ANION GAP SERPL CALC-SCNC: 10 MMOL/L — SIGNIFICANT CHANGE UP (ref 5–17)
AST SERPL-CCNC: 43 U/L — HIGH (ref 10–40)
BASOPHILS # BLD AUTO: 0 K/UL — SIGNIFICANT CHANGE UP (ref 0–0.2)
BASOPHILS NFR BLD AUTO: 0 % — SIGNIFICANT CHANGE UP (ref 0–2)
BILIRUB SERPL-MCNC: 0.4 MG/DL — SIGNIFICANT CHANGE UP (ref 0.2–1.2)
BUN SERPL-MCNC: 23 MG/DL — SIGNIFICANT CHANGE UP (ref 7–23)
CALCIUM SERPL-MCNC: 9.3 MG/DL — SIGNIFICANT CHANGE UP (ref 8.4–10.5)
CHLORIDE SERPL-SCNC: 104 MMOL/L — SIGNIFICANT CHANGE UP (ref 96–108)
CO2 SERPL-SCNC: 25 MMOL/L — SIGNIFICANT CHANGE UP (ref 22–31)
CREAT SERPL-MCNC: 1.16 MG/DL — SIGNIFICANT CHANGE UP (ref 0.5–1.3)
EGFR: 55 ML/MIN/1.73M2 — LOW
EOSINOPHIL # BLD AUTO: 0.06 K/UL — SIGNIFICANT CHANGE UP (ref 0–0.5)
EOSINOPHIL NFR BLD AUTO: 0.9 % — SIGNIFICANT CHANGE UP (ref 0–6)
GIANT PLATELETS BLD QL SMEAR: PRESENT — SIGNIFICANT CHANGE UP
GLUCOSE SERPL-MCNC: 110 MG/DL — HIGH (ref 70–99)
HCT VFR BLD CALC: 30 % — LOW (ref 34.5–45)
HGB BLD-MCNC: 9.5 G/DL — LOW (ref 11.5–15.5)
LYMPHOCYTES # BLD AUTO: 1.37 K/UL — SIGNIFICANT CHANGE UP (ref 1–3.3)
LYMPHOCYTES # BLD AUTO: 20.4 % — SIGNIFICANT CHANGE UP (ref 13–44)
MANUAL SMEAR VERIFICATION: SIGNIFICANT CHANGE UP
MCHC RBC-ENTMCNC: 31.7 GM/DL — LOW (ref 32–36)
MCHC RBC-ENTMCNC: 34.4 PG — HIGH (ref 27–34)
MCV RBC AUTO: 108.7 FL — HIGH (ref 80–100)
MONOCYTES # BLD AUTO: 0.44 K/UL — SIGNIFICANT CHANGE UP (ref 0–0.9)
MONOCYTES NFR BLD AUTO: 6.5 % — SIGNIFICANT CHANGE UP (ref 2–14)
NEUTROPHILS # BLD AUTO: 4.85 K/UL — SIGNIFICANT CHANGE UP (ref 1.8–7.4)
NEUTROPHILS NFR BLD AUTO: 72.2 % — SIGNIFICANT CHANGE UP (ref 43–77)
PLAT MORPH BLD: ABNORMAL
PLATELET # BLD AUTO: 263 K/UL — SIGNIFICANT CHANGE UP (ref 150–400)
POTASSIUM SERPL-MCNC: 4.9 MMOL/L — SIGNIFICANT CHANGE UP (ref 3.5–5.3)
POTASSIUM SERPL-SCNC: 4.9 MMOL/L — SIGNIFICANT CHANGE UP (ref 3.5–5.3)
PROT SERPL-MCNC: 7 G/DL — SIGNIFICANT CHANGE UP (ref 6–8.3)
RBC # BLD: 2.76 M/UL — LOW (ref 3.8–5.2)
RBC # FLD: 14.6 % — HIGH (ref 10.3–14.5)
RBC BLD AUTO: NORMAL — SIGNIFICANT CHANGE UP
SARS-COV-2 RNA SPEC QL NAA+PROBE: NEGATIVE — SIGNIFICANT CHANGE UP
SMUDGE CELLS # BLD: PRESENT — SIGNIFICANT CHANGE UP
SODIUM SERPL-SCNC: 139 MMOL/L — SIGNIFICANT CHANGE UP (ref 135–145)
WBC # BLD: 6.72 K/UL — SIGNIFICANT CHANGE UP (ref 3.8–10.5)
WBC # FLD AUTO: 6.72 K/UL — SIGNIFICANT CHANGE UP (ref 3.8–10.5)

## 2022-05-23 PROCEDURE — 36415 COLL VENOUS BLD VENIPUNCTURE: CPT

## 2022-05-23 PROCEDURE — 99285 EMERGENCY DEPT VISIT HI MDM: CPT

## 2022-05-23 PROCEDURE — 85025 COMPLETE CBC W/AUTO DIFF WBC: CPT

## 2022-05-23 PROCEDURE — 99285 EMERGENCY DEPT VISIT HI MDM: CPT | Mod: 25

## 2022-05-23 PROCEDURE — 80053 COMPREHEN METABOLIC PANEL: CPT

## 2022-05-23 PROCEDURE — 71045 X-RAY EXAM CHEST 1 VIEW: CPT | Mod: 26

## 2022-05-23 PROCEDURE — 93971 EXTREMITY STUDY: CPT

## 2022-05-23 PROCEDURE — 93971 EXTREMITY STUDY: CPT | Mod: 26,RT

## 2022-05-23 PROCEDURE — 87635 SARS-COV-2 COVID-19 AMP PRB: CPT

## 2022-05-23 PROCEDURE — 71045 X-RAY EXAM CHEST 1 VIEW: CPT

## 2022-05-23 NOTE — ED PROVIDER NOTE - OBJECTIVE STATEMENT
Pt w/  PMHx aortic regurgitation s/p AVR in 2012, CML on imatinib and allopurinol atrial fibrillation s/p ablation on Xarelto), admission to [facility] 5/10-5/12 for OM  s/p surgical resection of the left metatarsal bone and 5th proximal phalanx resection with clean margins, s/p Left foot first metatarsal medial exostectomy and left fifth toe arthroplasty and excision of ulcer w/ podiatry Dr Kline on 5/12/22. Pt had PICC placed and started on ABx per ID recs, to continue for 6 weeks (Daptomycin 900mg q24h + Cefepime 2g q8h x 6 weeks). She was restarted on her Xarelto on 5/12, which she reports compliance. She reports pain at the site of the PICC line. The PICC was last used at 2pm w/o incident. No f/c. ? swelling. No skin changes

## 2022-05-23 NOTE — ED ADULT TRIAGE NOTE - CHIEF COMPLAINT QUOTE
Pt co PICC problem. PICC to RUE x2 weeks for IV ABX, says arm began to swell thursday and became red and tender to touch. Denies fevers, chills.

## 2022-05-23 NOTE — ED PROVIDER NOTE - NSICDXPASTSURGICALHX_GEN_ALL_CORE_FT
PAST SURGICAL HISTORY:  H/O aortic valve replacement 2012 at [facility]    History of cardiac radiofrequency ablation 2/2020 for afib/flutter    History of gastric surgery

## 2022-05-23 NOTE — ED PROVIDER NOTE - CARE PLAN
1 Principal Discharge DX:	Arm pain  Secondary Diagnosis:	PICC (peripherally inserted central catheter) in place

## 2022-05-23 NOTE — ED PROVIDER NOTE - PHYSICAL EXAMINATION
Constitutional: Well appearing, awake, alert, oriented to person, place, time/situation and in no apparent distress.  ENMT: Airway patent. Normal MM  Eyes: Clear bilaterally  Cardiac: Normal rate, regular rhythm.  Heart sounds S1, S2.  No murmurs, rubs or gallops.  Respiratory: Breaths sounds equal and clear b/l. No increased WOB, tachypnea, hypoxia, or accessory mm use. Pt speaks in full sentences.   Musculoskeletal: Range of motion is not limited. b/l UE's appear symmetric in size. No appreciable swelling / erythema / warmth or skin changes adjacent to PICC. No induration. median / ulnar / radial nn intact distally. 2+ radial pulses b/l  Neuro: Alert and oriented x 3, face symmetric and speech fluent. Strength 5/5 x 4 ext and symmetric, nml gross motor movement, nml gait. No focal deficits noted.  Skin: Skin normal color for race, warm, dry and intact. No evidence of rash.  Psych: Alert and oriented to person, place, time/situation. normal mood and affect. no apparent risk to self or others.

## 2022-05-23 NOTE — ED PROVIDER NOTE - PATIENT PORTAL LINK FT
You can access the FollowMyHealth Patient Portal offered by [facility] by registering at the following website: [email or web address]. By joining [employer]’s FollowMyHealth portal, you will also be able to view your health information using other applications (apps) compatible with our system.

## 2022-05-23 NOTE — ED ADULT NURSE NOTE - NSICDXPASTSURGICALHX_GEN_ALL_CORE_FT
PAST SURGICAL HISTORY:  H/O aortic valve replacement 2012 at [facility]    History of cardiac radiofrequency ablation 2/2020 for afib/flutter    History of gastric surgery

## 2022-05-23 NOTE — ED PROVIDER NOTE - CLINICAL SUMMARY MEDICAL DECISION MAKING FREE TEXT BOX
Referred for r/o DVT from PICC. Low suspicion. Pending labs / US. D/w ID on call Dr Starr rodrigues d/c w/ continued tx if w/u neg

## 2022-05-23 NOTE — ED PROVIDER NOTE - NSFOLLOWUPINSTRUCTIONS_ED_ALL_ED_FT
Your blood work showed no acute abnormalities. Your ultrasound showed NO blood clot.  You xray confirms your PICC line is in place.     CONTINUE all your antibiotics as prescribed and follow up with ID and Podiatry      PICC Home Care Guide      A peripherally inserted central catheter (PICC) is a form of IV access that allows medicines and IV fluids to be quickly distributed throughout the body. The PICC is a long, thin, flexible tube (catheter) that is inserted into a vein in the upper arm. The catheter ends in a large vein in the chest (superior vena cava, or SVC). After the PICC is inserted, a chest X-ray may be done to make sure that it is in the correct place.    A PICC may be placed for different reasons, such as:  •To give medicines and liquid nutrition.      •To give IV fluids and blood products.      •If there is trouble placing a peripheral intravenous (PIV) catheter.      If taken care of properly, a PICC can remain in place for several months. Having a PICC can also allow a person to go home from the hospital sooner. Medicine and PICC care can be managed at home by a family member, caregiver, or home health care team.      What are the risks?    Generally, having a PICC is safe. However, problems may occur, including:  •A blood clot (thrombus) forming in or at the tip of the PICC.      •A blood clot forming in a vein (deep vein thrombosis) or traveling to the lung (pulmonary embolism).      •Inflammation of the vein (phlebitis) in which the PICC is placed.      •Infection. Central line associated blood stream infection (CLABSI) is a serious infection that often requires hospitalization.      •PICC movement (malposition). The PICC tip may move from its original position due to excessive physical activity, forceful coughing, sneezing, or vomiting.      •A break or cut in the PICC. It is important not to use scissors near the PICC.      •Nerve or tendon irritation or injury during PICC insertion.        How to take care of your PICC    Preventing problems   •You and any caregivers should wash your hands often with soap. Wash hands:  •Before touching the PICC line or the infusion device.      •Before changing a bandage (dressing).        •Flush the PICC as told by your health care provider. Let your health care provider know right away if the PICC is hard to flush or does not flush. Do not use force to flush the PICC.      • Do not use a syringe that is less than 10 mL to flush the PICC.      •Avoid blood pressure checks on the arm in which the PICC is placed.      •Never pull or tug on the PICC.      • Do not take the PICC out yourself. Only a trained clinical professional should remove the PICC.      •Use clean and sterile supplies only. Keep the supplies in a dry place. Do not reuse needles, syringes, or any other supplies. Doing that can lead to infection.      •Keep pets and children away from your PICC line.    •Check the PICC insertion site every day for signs of infection. Check for:  •Leakage.      •Redness, swelling, or pain.      •Fluid or blood.      •Warmth.      •Pus or a bad smell.        PICC dressing care     •Keep your PICC bandage (dressing) clean and dry to prevent infection.    • Do not take baths, swim, or use a hot tub until your health care provider approves. Ask your health care provider if you can take showers. You may only be allowed to take sponge baths for bathing. When you are allowed to shower:  •Ask your health care provider to teach you how to wrap the PICC line.      •Cover the PICC line with clear plastic wrap and tape to keep it dry while showering.      •Follow instructions from your health care provider about how to take care of your insertion site and dressing. Make sure you:  •Wash your hands with soap and water before you change your bandage (dressing). If soap and water are not available, use hand .      •Change your dressing as told by your health care provider.      •Leave stitches (sutures), skin glue, or adhesive strips in place. These skin closures may need to stay in place for 2 weeks or longer. If adhesive strip edges start to loosen and curl up, you may trim the loose edges. Do not remove adhesive strips completely unless your health care provider tells you to do that.        •Change your PICC dressing if it becomes loose or wet.        General instructions      •Carry your PICC identification card or wear a medical alert bracelet at all times.      •Keep the tube clamped at all times, unless it is being used.      •Carry a smooth-edge clamp with you at all times to place on the tube if it breaks.      • Do not use scissors or sharp objects near the tube.      •You may bend your arm and move it freely. If your PICC is near or at the bend of your elbow, avoid activity with repeated motion at the elbow.      •Avoid lifting heavy objects as told by your health care provider.      •Keep all follow-up visits as told by your health care provider. This is important.        Disposal of supplies    •Throw away any syringes in a disposal container that is meant for sharp items (sharps container). You can buy a sharps container from a pharmacy, or you can make one by using an empty hard plastic bottle with a cover.      •Place any used dressings or infusion bags into a plastic bag. Throw that bag in the trash.        Contact a health care provider if:    •You have pain in your arm, ear, face, or teeth.      •You have a fever or chills.      •You have redness, swelling, or pain around the insertion site.      •You have fluid or blood coming from the insertion site.      •Your insertion site feels warm to the touch.      •You have pus or a bad smell coming from the insertion site.      •Your skin feels hard and raised around the insertion site.        Get help right away if:    •Your PICC is accidentally pulled all the way out. If this happens, cover the insertion site with a bandage or gauze dressing. Do not throw the PICC away. Your health care provider will need to check it.      •Your PICC was tugged or pulled and has partially come out. Do not  push the PICC back in.      •You cannot flush the PICC, it is hard to flush, or the PICC leaks around the insertion site when it is flushed.      •You hear a "flushing" sound when the PICC is flushed.      •You feel your heart racing or skipping beats.      •There is a hole or tear in the PICC.      •You have swelling in the arm in which the PICC was inserted.      •You have a red streak going up your arm from where the PICC was inserted.        Summary    •A peripherally inserted central catheter (PICC) is a long, thin, flexible tube (catheter) that is inserted into a vein in the upper arm.      •The PICC is inserted using a sterile technique by a specially trained nurse or physician. Only a trained clinical professional should remove it.      •Keep your PICC identification card with you at all times.      •Avoid blood pressure checks on the arm in which the PICC is placed.      •If cared for properly, a PICC can remain in place for several months. Having a PICC can also allow a person to go home from the hospital sooner.      This information is not intended to replace advice given to you by your health care provider. Make sure you discuss any questions you have with your health care provider.

## 2022-05-23 NOTE — ED ADULT NURSE NOTE - OBJECTIVE STATEMENT
Pt received aaox3 c/o discomfort at PICC line site on RUE x1 week. Pt had picc placed on 5/18 for IV ABX for infection on left foot. Pt states that arm began to swell and became red and tender to touch on Thursday 5/19. At this time the insertion site has dried blood around the base of catheter. Skin is warm to the touch, but no redness or swelling noted.  Denies N/V, headache, fevers, chills, CP, or SOB.

## 2022-05-24 ENCOUNTER — NON-APPOINTMENT (OUTPATIENT)
Age: 57
End: 2022-05-24

## 2022-05-27 ENCOUNTER — NON-APPOINTMENT (OUTPATIENT)
Age: 57
End: 2022-05-27

## 2022-06-09 ENCOUNTER — APPOINTMENT (OUTPATIENT)
Dept: INFECTIOUS DISEASE | Facility: CLINIC | Age: 57
End: 2022-06-09
Payer: COMMERCIAL

## 2022-06-09 PROCEDURE — 99213 OFFICE O/P EST LOW 20 MIN: CPT | Mod: 95

## 2022-06-09 NOTE — DATA REVIEWED
[FreeTextEntry1] : --2022--\par 6/8/22 lab notable for\par WBC 6.7\par Hgb 8.9 stable\par ESR 33 downtrending\par Cr 1.11\par  (prior 486)\par CRP 12 stable \par \par 5/10 Tissue 3 L fifth MT: staph caprae (fluid media only): S to cefazo, dapto, linezo, Bact, vanco, R to doxy\par 5/10 Tissue 4 L fifth MT: staph hominis (fluid media only): S to linezo, dapto, ox, Bact, vanco, I to cefazolin; staph caprae (fluid media only) S to cefazo, linezolid, ox, bact, vanco\par 5/10 Tissue 2 L first MT: ngtd\par 5/10 Surgical swab 1 L first MT: ngtd\par \par Path: not available \par \par 5/11 X-ray \par Findings/\par impression: First metatarsal post surgical changes. Hallux valgus, first \par metatarsal/phalangeal degenerative changes, first metatarsal head \par sesamoid hypertrophic changes. Post partial resection fifth middle and \par fifth proximal phalanges.

## 2022-06-09 NOTE — HISTORY OF PRESENT ILLNESS
[FreeTextEntry1] : 55F h/o Afib/aflutter s/p ablation 2/17/21, AS s/p AVR (2012 at [place]), cystic kidney disease, CML. c.diff diarrhea in 3/2021, L foot bunion c/b 1st and 5th toe OM s/p L foot 1st MT bone resection, 5th proximal phalanx resection on 5/10/22 (OR culture grew staph caprae and staph hominis) currently on IV dapto (5/11-6/21) p/w management of OM of 1st and 5th toe.  \par \par She has been having Bunion on L foot for a long time. About 5 months ago she noticed a crack on L bunion which got worse over time. The wound never healed but has discharge. Was seen by Dr. Kline and was told that she has OM and need abx. She was treated with doxycycline on and off for a while, also had keflex. She thinks she has been on/off abx for a few months but cannot tell exact duration. She thinks abx improved the wound but after stopping it the wound soon becomes worse. Denied fever/chills, n/v/d.   She initially refused to get admitted and wanted to have outpatient management.  She was seen by me on 5/5 and was recommended to be admitted after surgery for initiation of empiric IV abx, which she agreed.  She presented to [facility] on 5/10 for scheduled surgery and underwent L foot 1st MT bone resection, 5th proximal phalanx resection on 5/10.  Empiric IV dapto/cefepime was started on 5/11 and was discharged home with 6 weeks course.  The plan was to follow up OR culture as outpatient and adjust abx.\par \par During last visit on 5/17, cefepime was discontinued and only daptomycin was continued since WCx grew staph caprae and staph hominis.  Today patient reports feeling well, denied any pain at her foot, wound healed, has some scabs, no drainage.  Denied fever/chills, n/v/d/abdominal pain, muscleache.

## 2022-06-09 NOTE — DISCUSSION/SUMMARY
[FreeTextEntry1] : Returned a call from the patient.  She asked if her hgb came down since her VNS told her that her Hgb dropped.  I told her that her Hgb is stable and nothing to worry about.  \par \par of note, her CK is 400s but she doesn't have any muscle pain.  Per manufacture recommendation, ok to give dapto up to CK 2000s if asymptomatic.

## 2022-06-09 NOTE — REASON FOR VISIT
[Follow-Up: _____] : a [unfilled] follow-up visit [Other Location: e.g. School (Enter Location, City,State)___] : at [unfilled], at the time of the visit. [Verbal consent obtained from patient] : the patient, [unfilled] [Medical Office: ([facility])___] : at the medical office located in  [FreeTextEntry1] : OM of foot

## 2022-06-09 NOTE — ASSESSMENT
[FreeTextEntry1] : 55F h/o Afib/aflutter s/p ablation 2/17/21, AS s/p AVR (2012 at [place]), cystic kidney disease, CML. c.diff diarrhea in 3/2021, L foot bunion c/b 1st and 5th toe OM s/p L foot 1st MT bone resection, 5th proximal phalanx resection on 5/10/22 (OR culture grew staph caprae and staph hominis) currently on IV dapto (5/11-6/21) p/w management of OM of 1st and 5th toe.   \par \par Patient is doing well on daptomycin.  Will complete 6 weeks course as scheduled.  \par \par - cont daptomycin 900mg IV q24h\par - Duration of antibiotics is 6 weeks ( 5/11 - 6/21)\par - remove PICC on 6/21 \par - f/u Dr. Bruce (podiatry)\par - RTC 2-3 weeks \par \par \par

## 2022-06-14 ENCOUNTER — APPOINTMENT (OUTPATIENT)
Dept: HEART AND VASCULAR | Facility: CLINIC | Age: 57
End: 2022-06-14
Payer: COMMERCIAL

## 2022-06-14 PROCEDURE — 99214 OFFICE O/P EST MOD 30 MIN: CPT | Mod: 95

## 2022-06-14 NOTE — DISCUSSION/SUMMARY
[FreeTextEntry1] : continue entresto +Lasix +Toprol for chf/CM; Xarelto for AF\par she will get ekg\par telehealth visit done using [employer] platform

## 2022-06-14 NOTE — REASON FOR VISIT
[Home] : at home, [unfilled] , at the time of the visit. [Medical Office: ([facility])___] : at the medical office located in  [Spouse] : spouse [Patient] : the patient [Symptom and Test Evaluation] : symptom and test evaluation [Cardiac Failure] : cardiac failure

## 2022-06-14 NOTE — HISTORY OF PRESENT ILLNESS
[FreeTextEntry1] : on IV abx for osteomyelitis- no increase in weight/sob on BIW Lasix. advised to stay off her feet by ID/ortho

## 2022-06-16 ENCOUNTER — APPOINTMENT (OUTPATIENT)
Dept: HEART AND VASCULAR | Facility: CLINIC | Age: 57
End: 2022-06-16
Payer: COMMERCIAL

## 2022-06-16 ENCOUNTER — NON-APPOINTMENT (OUTPATIENT)
Age: 57
End: 2022-06-16

## 2022-06-16 VITALS
BODY MASS INDEX: 41.65 KG/M2 | WEIGHT: 250 LBS | HEART RATE: 90 BPM | DIASTOLIC BLOOD PRESSURE: 63 MMHG | HEIGHT: 65 IN | SYSTOLIC BLOOD PRESSURE: 91 MMHG | OXYGEN SATURATION: 98 % | TEMPERATURE: 97.3 F

## 2022-06-16 VITALS — DIASTOLIC BLOOD PRESSURE: 80 MMHG | SYSTOLIC BLOOD PRESSURE: 106 MMHG

## 2022-06-16 PROCEDURE — 99213 OFFICE O/P EST LOW 20 MIN: CPT | Mod: 25

## 2022-06-16 PROCEDURE — 93000 ELECTROCARDIOGRAM COMPLETE: CPT

## 2022-06-16 RX ORDER — MICONAZOLE NITRATE 1200MG-2%
200-2 KIT VAGINAL
Qty: 1 | Refills: 0 | Status: DISCONTINUED | COMMUNITY
Start: 2022-05-17 | End: 2022-06-16

## 2022-06-16 NOTE — DISCUSSION/SUMMARY
[FreeTextEntry1] : EKG:Probably atrial flutter with regular rate\par off amio- cont BB + Xarelto for AF; toprol + entresto + Lasix for CHF/CM\par currently on abx

## 2022-06-16 NOTE — PHYSICAL EXAM
[Well Developed] : well developed [Well Nourished] : well nourished [No Acute Distress] : no acute distress [Obese] : obese [Normal Conjunctiva] : normal conjunctiva [Normal Venous Pressure] : normal venous pressure [No Carotid Bruit] : no carotid bruit [Clear Lung Fields] : clear lung fields [Good Air Entry] : good air entry [No Respiratory Distress] : no respiratory distress  [Soft] : abdomen soft [Non Tender] : non-tender [Normal Gait] : normal gait [No Edema] : no edema [No Varicosities] : no varicosities [No Rash] : no rash [Moves all extremities] : moves all extremities [Alert and Oriented] : alert and oriented [de-identified] : soft systolic murmur

## 2022-06-21 ENCOUNTER — APPOINTMENT (OUTPATIENT)
Dept: INFECTIOUS DISEASE | Facility: CLINIC | Age: 57
End: 2022-06-21
Payer: COMMERCIAL

## 2022-06-21 ENCOUNTER — NON-APPOINTMENT (OUTPATIENT)
Age: 57
End: 2022-06-21

## 2022-06-21 ENCOUNTER — APPOINTMENT (OUTPATIENT)
Dept: HEART AND VASCULAR | Facility: CLINIC | Age: 57
End: 2022-06-21
Payer: COMMERCIAL

## 2022-06-21 VITALS
TEMPERATURE: 97 F | HEIGHT: 65 IN | SYSTOLIC BLOOD PRESSURE: 103 MMHG | DIASTOLIC BLOOD PRESSURE: 57 MMHG | BODY MASS INDEX: 41.65 KG/M2 | HEART RATE: 91 BPM | WEIGHT: 250 LBS

## 2022-06-21 DIAGNOSIS — I48.92 UNSPECIFIED ATRIAL FLUTTER: ICD-10-CM

## 2022-06-21 DIAGNOSIS — Z79.2 LONG TERM (CURRENT) USE OF ANTIBIOTICS: ICD-10-CM

## 2022-06-21 DIAGNOSIS — M86.9 OSTEOMYELITIS, UNSPECIFIED: ICD-10-CM

## 2022-06-21 DIAGNOSIS — B95.8 UNSPECIFIED STAPHYLOCOCCUS AS THE CAUSE OF DISEASES CLASSIFIED ELSEWHERE: ICD-10-CM

## 2022-06-21 PROCEDURE — 99212 OFFICE O/P EST SF 10 MIN: CPT | Mod: 95

## 2022-06-21 PROCEDURE — 99214 OFFICE O/P EST MOD 30 MIN: CPT

## 2022-06-21 PROCEDURE — 93000 ELECTROCARDIOGRAM COMPLETE: CPT

## 2022-06-21 PROCEDURE — 99202 OFFICE O/P NEW SF 15 MIN: CPT | Mod: 95

## 2022-06-21 NOTE — HISTORY OF PRESENT ILLNESS
[FreeTextEntry1] : 55F h/o Afib/aflutter s/p ablation 2/17/21, AS s/p AVR (2012 at [place]), cystic kidney disease, CML. c.diff diarrhea in 3/2021, L foot bunion c/b 1st and 5th toe OM s/p L foot 1st MT bone resection, 5th proximal phalanx resection on 5/10/22 (OR culture grew staph caprae and staph hominis) currently on IV dapto (5/11-6/21) p/w management of OM of 1st and 5th toe.  \par \par She has been having Bunion on L foot for a long time. About 5 months ago she noticed a crack on L bunion which got worse over time. The wound never healed but has discharge. Was seen by Dr. Kline and was told that she has OM and need abx. She was treated with doxycycline on and off for a while, also had keflex. She thinks she has been on/off abx for a few months but cannot tell exact duration. She thinks abx improved the wound but after stopping it the wound soon becomes worse. Denied fever/chills, n/v/d.   She initially refused to get admitted and wanted to have outpatient management.  She was seen by me on 5/5 and was recommended to be admitted after surgery for initiation of empiric IV abx, which she agreed.  She presented to [facility] on 5/10 for scheduled surgery and underwent L foot 1st MT bone resection, 5th proximal phalanx resection on 5/10.  Empiric IV dapto/cefepime was started on 5/11 and was discharged home with 6 weeks course.  The plan was to follow up OR culture as outpatient and adjust abx.\par \par During the visit of 5/17, Cefepime was discontinued and only daptomycin was continued since WCx grew staph caprae and staph hominis.  Last visit 6/9.   Today patient didn't want to do in-person visit and requested telehealth. She reports feeling well, denied any pain at her foot, wound healed, has some scabs, no drainage.  Denied fever/chills, n/v/d/abdominal pain, muscle ache.  She says wound at 5th toe closed, the wound at 1st toe is opened slightly but improving.

## 2022-06-21 NOTE — HISTORY OF PRESENT ILLNESS
[FreeTextEntry1] : 55 y/o F with h/o congenital right kidney abnormality / cystic kidney disease, morbid obesity s/p gastric sleeve surgery (2016),  atrial fibrillation/flutter and AS s/p AV replacement (2012 @ [facility]), S/P DCCV 1/2019, Atrial flutter ablation 2/2020 with Dr. Conner.  She had recurrent atypical atrial flutter now s/p DCCV 12/2020.  She reports DCCV lasted for a matter of hours and she was recommended to undergo another ablation [place] [facility]   She was started on Multaq 400 mg BID and is s/p ablation of atrial fibrillation and atypical atrial flutter on 2/17/21.  She had a non clinical atrial tachycardia at the end of the case and was cardioverted to SR.  ACE was not started 2/2 hypotension.  EF 40% by ECHO. \par \par She feels fine and was in a normal rhythm in October but noted to be back in and atrial arrhythmia with RVR (100's) in November.  No CP, palpitations, dizziness, presyncope/syncope.  She has been on Xarelto uninterrupted and denies any bleeding issues. Dyspnea on exertion after walking 2 blocks; this is unchanged recently.  Off AMIO given persistent arrhythmia.\par \par Her past history significant for COVID 19 and CDiff infection.  \par \par \par

## 2022-06-21 NOTE — CARDIOLOGY SUMMARY
[___] : [unfilled] [de-identified] : 6/21/22 AT with 2:1 AV conduction, VR ~ 90bpm \par 2/1/22 Atrial tachycardia with 2:1 AV conduction \par 3/1/22 NSR 92 bpm

## 2022-06-21 NOTE — PHYSICAL EXAM
[Well Developed] : well developed [Well Nourished] : well nourished [No Acute Distress] : no acute distress [Normal Venous Pressure] : normal venous pressure [No Carotid Bruit] : no carotid bruit [Normal S1, S2] : normal S1, S2 [No Murmur] : no murmur [No Rub] : no rub [No Gallop] : no gallop [Clear Lung Fields] : clear lung fields [Good Air Entry] : good air entry [No Respiratory Distress] : no respiratory distress  [Soft] : abdomen soft [Non Tender] : non-tender [No Masses/organomegaly] : no masses/organomegaly [Normal Bowel Sounds] : normal bowel sounds [Normal Gait] : normal gait [No Edema] : no edema [No Cyanosis] : no cyanosis [No Clubbing] : no clubbing [No Varicosities] : no varicosities [No Rash] : no rash [No Skin Lesions] : no skin lesions [Moves all extremities] : moves all extremities [No Focal Deficits] : no focal deficits [Normal Speech] : normal speech [Alert and Oriented] : alert and oriented [Normal memory] : normal memory [General Appearance - Well Developed] : well developed [Normal Appearance] : normal appearance [Well Groomed] : well groomed [General Appearance - Well Nourished] : well nourished [No Deformities] : no deformities [General Appearance - In No Acute Distress] : no acute distress [Normal Conjunctiva] : the conjunctiva exhibited no abnormalities [Eyelids - No Xanthelasma] : the eyelids demonstrated no xanthelasmas [Normal Oral Mucosa] : normal oral mucosa [No Oral Pallor] : no oral pallor [No Oral Cyanosis] : no oral cyanosis [Normal Jugular Venous A Waves Present] : normal jugular venous A waves present [Normal Jugular Venous V Waves Present] : normal jugular venous V waves present [No Jugular Venous Hong A Waves] : no jugular venous hong A waves [Abdomen Soft] : soft [Abdomen Tenderness] : non-tender [Abdomen Mass (___ Cm)] : no abdominal mass palpated [Abnormal Walk] : normal gait [Gait - Sufficient For Exercise Testing] : the gait was sufficient for exercise testing [Nail Clubbing] : no clubbing of the fingernails [Cyanosis, Localized] : no localized cyanosis [Petechial Hemorrhages (___cm)] : no petechial hemorrhages [Skin Color & Pigmentation] : normal skin color and pigmentation [] : no rash [No Venous Stasis] : no venous stasis [Skin Lesions] : no skin lesions [No Skin Ulcers] : no skin ulcer [No Xanthoma] : no  xanthoma was observed [Oriented To Time, Place, And Person] : oriented to person, place, and time [Affect] : the affect was normal [Mood] : the mood was normal [No Anxiety] : not feeling anxious [5th Left ICS - MCL] : palpated at the 5th LICS in the midclavicular line [Normal] : normal [Tachycardia] : tachycardic [Regularly Irregular] : regularly irregular [II] : a grade 2 [No Pitting Edema] : no pitting edema present

## 2022-06-21 NOTE — REASON FOR VISIT
[Follow-Up: _____] : a [unfilled] follow-up visit [Other Location: e.g. School (Enter Location, City,State)___] : at [unfilled], at the time of the visit. [Medical Office: ([facility])___] : at the medical office located in  [Verbal consent obtained from patient] : the patient, [unfilled] [FreeTextEntry1] : OM of foot

## 2022-06-21 NOTE — DATA REVIEWED
[FreeTextEntry1] : --2022--\par 6/15 lab notable for\par 5.78>8.6\par ESR 37\par Cr 1.23\par  \par \par 5/10 Tissue 3 L fifth MT: staph caprae (fluid media only): S to cefazo, dapto, linezo, Bact, vanco, R to doxy\par 5/10 Tissue 4 L fifth MT: staph hominis (fluid media only): S to linezo, dapto, ox, Bact, vanco, I to cefazolin; staph caprae (fluid media only) S to cefazo, linezolid, ox, bact, vanco\par 5/10 Tissue 2 L first MT: ngtd\par 5/10 Surgical swab 1 L first MT: ngtd\par \par Path: not available \par \par 5/11 X-ray \par Findings/\par impression: First metatarsal post surgical changes. Hallux valgus, first \par metatarsal/phalangeal degenerative changes, first metatarsal head \par sesamoid hypertrophic changes. Post partial resection fifth middle and \par fifth proximal phalanges.

## 2022-06-21 NOTE — ASSESSMENT
[FreeTextEntry1] : 55F h/o Afib/aflutter s/p ablation 2/17/21, AS s/p AVR (2012 at [place]), cystic kidney disease, CML. c.diff diarrhea in 3/2021, L foot bunion c/b 1st and 5th toe OM s/p L foot 1st MT bone resection, 5th proximal phalanx resection on 5/10/22 (OR culture grew staph caprae and staph hominis) currently on IV dapto (5/11-6/21) p/w management of OM of 1st and 5th toe.   \par \par Patient is doing well on daptomycin.  Will complete 6 weeks course as scheduled.  Per Dr. Kline who saw her last week, 5th toe looks great and the wound at 1st MT head opened slightly but looks better.     \par \par - cont daptomycin 900mg IV q24h\par - Duration of antibiotics is 6 weeks ( 5/11 - 6/21)\par - remove PICC on 6/21 \par - f/u Dr. Bruce (podiatry)\par - will check CK in 2 weeks \par - RTC prn\par \par \par \par

## 2022-08-08 ENCOUNTER — RX RENEWAL (OUTPATIENT)
Age: 57
End: 2022-08-08

## 2022-08-19 ENCOUNTER — RX RENEWAL (OUTPATIENT)
Age: 57
End: 2022-08-19

## 2022-10-16 ENCOUNTER — RX RENEWAL (OUTPATIENT)
Age: 57
End: 2022-10-16

## 2022-10-27 ENCOUNTER — RX RENEWAL (OUTPATIENT)
Age: 57
End: 2022-10-27

## 2022-11-08 VITALS
OXYGEN SATURATION: 100 % | RESPIRATION RATE: 17 BRPM | SYSTOLIC BLOOD PRESSURE: 106 MMHG | TEMPERATURE: 98 F | HEART RATE: 97 BPM | DIASTOLIC BLOOD PRESSURE: 72 MMHG

## 2022-11-08 VITALS
WEIGHT: 250 LBS | HEIGHT: 64 IN | OXYGEN SATURATION: 96 % | DIASTOLIC BLOOD PRESSURE: 63 MMHG | RESPIRATION RATE: 16 BRPM | HEART RATE: 91 BPM | TEMPERATURE: 98 F | SYSTOLIC BLOOD PRESSURE: 100 MMHG

## 2022-11-08 LAB
ALBUMIN SERPL ELPH-MCNC: 4.1 G/DL — SIGNIFICANT CHANGE UP (ref 3.3–5)
ALP SERPL-CCNC: 107 U/L — SIGNIFICANT CHANGE UP (ref 40–120)
ALT FLD-CCNC: 17 U/L — SIGNIFICANT CHANGE UP (ref 10–45)
ANION GAP SERPL CALC-SCNC: 13 MMOL/L — SIGNIFICANT CHANGE UP (ref 5–17)
APPEARANCE UR: ABNORMAL
AST SERPL-CCNC: 25 U/L — SIGNIFICANT CHANGE UP (ref 10–40)
BACTERIA # UR AUTO: ABNORMAL /HPF
BASOPHILS # BLD AUTO: 0.01 K/UL — SIGNIFICANT CHANGE UP (ref 0–0.2)
BASOPHILS NFR BLD AUTO: 0.2 % — SIGNIFICANT CHANGE UP (ref 0–2)
BILIRUB SERPL-MCNC: 0.4 MG/DL — SIGNIFICANT CHANGE UP (ref 0.2–1.2)
BILIRUB UR-MCNC: NEGATIVE — SIGNIFICANT CHANGE UP
BUN SERPL-MCNC: 13 MG/DL — SIGNIFICANT CHANGE UP (ref 7–23)
CALCIUM SERPL-MCNC: 9.1 MG/DL — SIGNIFICANT CHANGE UP (ref 8.4–10.5)
CHLORIDE SERPL-SCNC: 102 MMOL/L — SIGNIFICANT CHANGE UP (ref 96–108)
CO2 SERPL-SCNC: 23 MMOL/L — SIGNIFICANT CHANGE UP (ref 22–31)
COLOR SPEC: YELLOW — SIGNIFICANT CHANGE UP
CREAT SERPL-MCNC: 1.04 MG/DL — SIGNIFICANT CHANGE UP (ref 0.5–1.3)
DIFF PNL FLD: ABNORMAL
EGFR: 62 ML/MIN/1.73M2 — SIGNIFICANT CHANGE UP
EOSINOPHIL # BLD AUTO: 0.15 K/UL — SIGNIFICANT CHANGE UP (ref 0–0.5)
EOSINOPHIL NFR BLD AUTO: 2.3 % — SIGNIFICANT CHANGE UP (ref 0–6)
EPI CELLS # UR: ABNORMAL /HPF (ref 0–5)
GLUCOSE SERPL-MCNC: 113 MG/DL — HIGH (ref 70–99)
GLUCOSE UR QL: NEGATIVE — SIGNIFICANT CHANGE UP
HCT VFR BLD CALC: 29.8 % — LOW (ref 34.5–45)
HGB BLD-MCNC: 9.1 G/DL — LOW (ref 11.5–15.5)
IMM GRANULOCYTES NFR BLD AUTO: 0.2 % — SIGNIFICANT CHANGE UP (ref 0–0.9)
KETONES UR-MCNC: NEGATIVE — SIGNIFICANT CHANGE UP
LACTATE SERPL-SCNC: 1.4 MMOL/L — SIGNIFICANT CHANGE UP (ref 0.5–2)
LEUKOCYTE ESTERASE UR-ACNC: ABNORMAL
LIDOCAIN IGE QN: 60 U/L — SIGNIFICANT CHANGE UP (ref 7–60)
LYMPHOCYTES # BLD AUTO: 1.63 K/UL — SIGNIFICANT CHANGE UP (ref 1–3.3)
LYMPHOCYTES # BLD AUTO: 24.7 % — SIGNIFICANT CHANGE UP (ref 13–44)
MCHC RBC-ENTMCNC: 30.5 GM/DL — LOW (ref 32–36)
MCHC RBC-ENTMCNC: 32.5 PG — SIGNIFICANT CHANGE UP (ref 27–34)
MCV RBC AUTO: 106.4 FL — HIGH (ref 80–100)
MONOCYTES # BLD AUTO: 0.8 K/UL — SIGNIFICANT CHANGE UP (ref 0–0.9)
MONOCYTES NFR BLD AUTO: 12.1 % — SIGNIFICANT CHANGE UP (ref 2–14)
NEUTROPHILS # BLD AUTO: 3.99 K/UL — SIGNIFICANT CHANGE UP (ref 1.8–7.4)
NEUTROPHILS NFR BLD AUTO: 60.5 % — SIGNIFICANT CHANGE UP (ref 43–77)
NITRITE UR-MCNC: POSITIVE
NRBC # BLD: 0 /100 WBCS — SIGNIFICANT CHANGE UP (ref 0–0)
PH UR: 6 — SIGNIFICANT CHANGE UP (ref 5–8)
PLATELET # BLD AUTO: 254 K/UL — SIGNIFICANT CHANGE UP (ref 150–400)
POTASSIUM SERPL-MCNC: 4.4 MMOL/L — SIGNIFICANT CHANGE UP (ref 3.5–5.3)
POTASSIUM SERPL-SCNC: 4.4 MMOL/L — SIGNIFICANT CHANGE UP (ref 3.5–5.3)
PROT SERPL-MCNC: 7.6 G/DL — SIGNIFICANT CHANGE UP (ref 6–8.3)
PROT UR-MCNC: 30 MG/DL
RBC # BLD: 2.8 M/UL — LOW (ref 3.8–5.2)
RBC # FLD: 15 % — HIGH (ref 10.3–14.5)
RBC CASTS # UR COMP ASSIST: ABNORMAL /HPF
SARS-COV-2 RNA SPEC QL NAA+PROBE: NEGATIVE — SIGNIFICANT CHANGE UP
SODIUM SERPL-SCNC: 138 MMOL/L — SIGNIFICANT CHANGE UP (ref 135–145)
SP GR SPEC: 1.02 — SIGNIFICANT CHANGE UP (ref 1–1.03)
UROBILINOGEN FLD QL: 0.2 E.U./DL — SIGNIFICANT CHANGE UP
WBC # BLD: 6.59 K/UL — SIGNIFICANT CHANGE UP (ref 3.8–10.5)
WBC # FLD AUTO: 6.59 K/UL — SIGNIFICANT CHANGE UP (ref 3.8–10.5)
WBC UR QL: > 10 /HPF

## 2022-11-08 PROCEDURE — 74177 CT ABD & PELVIS W/CONTRAST: CPT | Mod: 26,MA

## 2022-11-08 PROCEDURE — 99285 EMERGENCY DEPT VISIT HI MDM: CPT

## 2022-11-08 RX ORDER — OXYCODONE HYDROCHLORIDE 5 MG/1
5 TABLET ORAL EVERY 4 HOURS
Refills: 0 | Status: DISCONTINUED | OUTPATIENT
Start: 2022-11-08 | End: 2022-11-09

## 2022-11-08 RX ORDER — CEFTRIAXONE 500 MG/1
1000 INJECTION, POWDER, FOR SOLUTION INTRAMUSCULAR; INTRAVENOUS ONCE
Refills: 0 | Status: COMPLETED | OUTPATIENT
Start: 2022-11-08 | End: 2022-11-08

## 2022-11-08 RX ORDER — HEPARIN SODIUM 5000 [USP'U]/ML
5000 INJECTION INTRAVENOUS; SUBCUTANEOUS EVERY 8 HOURS
Refills: 0 | Status: DISCONTINUED | OUTPATIENT
Start: 2022-11-08 | End: 2022-11-09

## 2022-11-08 RX ORDER — ACETAMINOPHEN 500 MG
650 TABLET ORAL EVERY 6 HOURS
Refills: 0 | Status: DISCONTINUED | OUTPATIENT
Start: 2022-11-08 | End: 2022-11-09

## 2022-11-08 RX ORDER — SODIUM CHLORIDE 9 MG/ML
1000 INJECTION INTRAMUSCULAR; INTRAVENOUS; SUBCUTANEOUS ONCE
Refills: 0 | Status: COMPLETED | OUTPATIENT
Start: 2022-11-08 | End: 2022-11-08

## 2022-11-08 RX ADMIN — CEFTRIAXONE 100 MILLIGRAM(S): 500 INJECTION, POWDER, FOR SOLUTION INTRAMUSCULAR; INTRAVENOUS at 21:04

## 2022-11-08 RX ADMIN — CEFTRIAXONE 1000 MILLIGRAM(S): 500 INJECTION, POWDER, FOR SOLUTION INTRAMUSCULAR; INTRAVENOUS at 21:49

## 2022-11-08 RX ADMIN — SODIUM CHLORIDE 1000 MILLILITER(S): 9 INJECTION INTRAMUSCULAR; INTRAVENOUS; SUBCUTANEOUS at 21:49

## 2022-11-08 NOTE — ED PROVIDER NOTE - NSICDXPASTSURGICALHX_GEN_ALL_CORE_FT
PAST SURGICAL HISTORY:  H/O aortic valve replacement 2012 at [facility]    History of cardiac radiofrequency ablation 2/2020 for afib/flutter    History of gastric surgery

## 2022-11-08 NOTE — ED PROVIDER NOTE - PATIENT PORTAL LINK FT
You can access the FollowMyHealth Patient Portal offered by [facility] by registering at the following website: [email or web address]. By joining [employer]’s FollowMyHealth portal, you will also be able to view your health information using other applications (apps) compatible with our system.

## 2022-11-08 NOTE — ED PROVIDER NOTE - PROGRESS NOTE DETAILS
Consulted Surgery to evaluate patient for concern for incarcerated Ventral Hernia. Surgery evaluated patient and recommends admission to Dr. Álvarez for observation.   No plan for OR at this time. Patient and  are refusing admission.   Explained I am concerned given she has incarcerated hernia can progress to strangulation and sepsis.   They will think about staying. patient discussed with her  and wish to sign out against medical advice.   The patient possesses decision making capacity by expressing the choice to leave, understands the treatment and plan being offered, and understands the risks of leaving including progression to strangulated hernia, sepsis, permanent disability and death. Patient has had an opportunity to ask questions about their condition. Patient has demonstrated logical reasoning for their decision to leave and has demonstrated comprehension of the treatment options that were offered. Patient has been informed that they may return for care at any time and follow up has been provided.

## 2022-11-08 NOTE — H&P ADULT - NSHPREVIEWOFSYSTEMS_GEN_ALL_CORE
CONSTITUTIONAL:  No weight loss, fever, chills, weakness or fatigue.  HEENT:  Eyes:  No visual loss, blurred vision, double vision or yellow sclerae. Ears, Nose, Throat:  No hearing loss, sneezing, congestion, runny nose or sore throat.  SKIN:  No rash or itching.  CARDIOVASCULAR:  No chest pain, chest pressure or chest discomfort. No palpitations or edema.  RESPIRATORY:  No shortness of breath, cough or sputum.  GENITOURINARY:  No burning on urination.   NEUROLOGICAL:  No headache, dizziness, syncope, paralysis, ataxia, numbness or tingling in the extremities. No change in bowel or bladder control.  MUSCULOSKELETAL:  No muscle, back pain, joint pain or stiffness.  HEMATOLOGIC:  No anemia, bleeding or bruising.  LYMPHATICS:  No enlarged nodes. No history of splenectomy.  PSYCHIATRIC:  No history of depression or anxiety.  ENDOCRINOLOGIC:  No reports of sweating, cold or heat intolerance. No polyuria or polydipsia.  ALLERGIES:  No history of asthma, hives, eczema or rhinitis.
Yes

## 2022-11-08 NOTE — ED ADULT NURSE NOTE - NSICDXPASTSURGICALHX_GEN_ALL_CORE_FT
PAST SURGICAL HISTORY:  H/O aortic valve replacement 2012 at [facility]    History of cardiac radiofrequency ablation 2/2020 for afib/flutter    History of gastric surgery

## 2022-11-08 NOTE — H&P ADULT - HISTORY OF PRESENT ILLNESS
58yo Female pt with PMHx Afib on Xarelto, CML on Imatinib (following oncologist at [facility]), and s/p sleeve gastrectomy in 2016 with Dr. Avitia at [facility], presents to ED c/o abdominal pain since yesterday. Patient indicates that she has had this pain before, but not as severe. She indicates that the hernia has been there for several years after her sleeve gastrectomy, but usually is reducible on it's own. Because of the severity of the pain, and her inability to reduced it, she decided to come the ED. Patient passing flatus, last BM this morning. Denies fever, chills, nausea, emesis, diarrhea, melena, hematochezia, hematemesis.    Last colonoscopy/EGD - Never  Denies family hx of IBS, Crohn's, UC, or colon cancer.    Surgery consulted for incarcerated ventral hernia.    In the ED, pt afebrile, nontachycardic, normotensive, and satting on RA. Labs swbc 6.59, H/H 9.1/29.8, , Lactate 1.4, chem wnl. CT A/P with findings consistent with Redemonstration of a large 17 cm ventral wall hernia containing nonobstructed small bowel and colon. New fat stranding around the hernia neck may indicate incarceration; Recommend correlation with physical exam. Attemtped to reduce at bedside, but unable to do so.    LABS:                        9.1    6.59  )-----------( 254      ( 08 Nov 2022 19:01 )             29.8     11-08    138  |  102  |  13  ----------------------------<  113<H>  4.4   |  23  |  1.04    Ca    9.1      08 Nov 2022 19:01    TPro  7.6  /  Alb  4.1  /  TBili  0.4  /  DBili  x   /  AST  25  /  ALT  17  /  AlkPhos  107  11-08      PMH: CML, Aortic valve prolapse, A. fib s/p ablation (on xarelto);  PSx: Aortic valve replacement, bunionectomy, sleeve gastrectomy (2016)  Social Hx: Denied tobacco or alcohol use  Family Hx: Noncontributory    Meds: Allopurinol, imatinib, entresto, xarelto, metoprolol    Physical Exam    General: NAD, laying comfortably in bed  Neuro: AAOx3, no defitics  Cardio: S1,S2, RRR  Pulm: Nonlabored breathing, lungs bilaterally clear to auscultation  Abdomen: Obese, soft, ND, mildly tender periumbilically, noted non-reducible ventral hernia without overlying skin changes  Extremities: Palpable peripheral pulses

## 2022-11-08 NOTE — H&P ADULT - NSHPPHYSICALEXAM_GEN_ALL_CORE
Physical Exam    General: NAD, laying comfortably in bed  Neuro: AAOx3, no defitics  Cardio: S1,S2, RRR  Pulm: Nonlabored breathing, lungs bilaterally clear to auscultation  Abdomen: Obese, soft, ND, mildly tender periumbilically, noted non-reducible ventral hernia without overlying skin changes  Extremities: Palpable peripheral pulses

## 2022-11-08 NOTE — H&P ADULT - NSHPLABSRESULTS_GEN_ALL_CORE
.  LABS:                         9.1    6.59  )-----------( 254      ( 2022 19:01 )             29.8         138  |  102  |  13  ----------------------------<  113<H>  4.4   |  23  |  1.04    Ca    9.1      2022 19:01    TPro  7.6  /  Alb  4.1  /  TBili  0.4  /  DBili  x   /  AST  25  /  ALT  17  /  AlkPhos  107  11-08      Urinalysis Basic - ( 2022 19:26 )    Color: Yellow / Appearance: SL Cloudy / S.025 / pH: x  Gluc: x / Ketone: NEGATIVE  / Bili: Negative / Urobili: 0.2 E.U./dL   Blood: x / Protein: 30 mg/dL / Nitrite: POSITIVE   Leuk Esterase: Moderate / RBC: 5-10 /HPF / WBC > 10 /HPF   Sq Epi: x / Non Sq Epi: Moderate /HPF / Bacteria: Many /HPF        Lactate, Blood: 1.4 mmol/L ( @ 21:51)      RADIOLOGY, EKG & ADDITIONAL TESTS: Reviewed.

## 2022-11-08 NOTE — ED PROVIDER NOTE - PHYSICAL EXAMINATION
CONSTITUTIONAL: Well-developed; in no acute distress.   HEAD:  normocephalic, atraumatic.  EYES: EOM intact; PERRL. conjunctiva and sclera clear.  ENT: Airway clear. Moist mucus membranes  NECK: Supple; non tender. No JVD.   CARD: S1, S2 normal; no murmurs, gallops, or rubs. Regular rate and rhythm. No chest wall tenderness.    RESP:  Clear to auscultation b/l, no wheezes, rales or rhonchi.  ABD: Ventral hernia palpable with tenderness, reducible. Mild erythema around hernia, no cyanosis or purplish color.   EXT: Normal ROM. No clubbing, cyanosis or edema. 2+ pulses to b/l ue/le.  NEURO: Alert, oriented, no focal neuro deficits   SKIN:  warm and dry, no acute rash. CONSTITUTIONAL: Well-developed; in no acute distress.   HEAD:  normocephalic, atraumatic.  EYES: EOM intact; PERRL. conjunctiva and sclera clear.  ENT: Airway clear. Moist mucus membranes  NECK: Supple; non tender. No JVD.   CARD: S1, S2 normal; no murmurs, gallops, or rubs. Regular rate and rhythm. No chest wall tenderness.    RESP:  Clear to auscultation b/l, no wheezes, rales or rhonchi.  ABD: Ventral hernia palpable with tenderness, not able to entirely reduce hernia. Mild erythema around hernia, no cyanosis. +generalized abdominal tenderness. no guarding, no rebound, no rigidity.   EXT: Normal ROM. No clubbing, cyanosis or edema. 2+ pulses to b/l ue/le.  NEURO: Alert, oriented, no focal neuro deficits   SKIN:  warm and dry, no acute rash.

## 2022-11-08 NOTE — ED PROVIDER NOTE - CLINICAL SUMMARY MEDICAL DECISION MAKING FREE TEXT BOX
56 y/o F pt presents to ED with abdominal pain and ventral hernia. Plan for labs, CT abdomen r/o incarceration or obstruction, or complication of bariatric surgery. 56 y/o F pt presents to ED with abdominal pain and ventral hernia concerned for incarcerated hernia.   Plan for labs, CT abdomen r/o incarceration or obstruction, or complication of bariatric surgery.

## 2022-11-08 NOTE — H&P ADULT - ASSESSMENT
56yo Female pt with PMHx Afib on Xarelto, CML on Imatinib (following oncologist at [facility]), and s/p sleeve gastrectomy in 2016 with Dr. Avitia at [facility], presents to ED c/o abdominal pain since yesterday. Patient indicates that she has had this pain before, but not as severe. She indicates that the hernia has been there for several years after her sleeve gastrectomy, but usually is reducible on it's own. Because of the severity of the pain, and her inability to reduced it, she decided to come the ED. Patient passing flatus, last BM this morning. Denies fever, chills, nausea, emesis, diarrhea, melena, hematochezia, hematemesis. Last colonoscopy/EGD - Never. Denies family hx of IBS, Crohn's, UC, or colon cancer. Surgery consulted for incarcerated ventral hernia. In the ED, pt afebrile, nontachycardic, normotensive, and satting on RA. Labs swbc 6.59, H/H 9.1/29.8, , Lactate 1.4, chem wnl. CT A/P with findings consistent with Redemonstration of a large 17 cm ventral wall hernia containing nonobstructed small bowel and colon. New fat stranding around the hernia neck may indicate incarceration; Recommend correlation with physical exam. Attemtped to reduce at bedside, but unable to do so. Due to current weight and patient without signs of obstruction, patient is not a candidate for ventral hernia repair, will need to lose weight, maybe revision of sleev for bypass, and then hernia repair, unless any changes in clinical status, or if patient became obstructed.    Plan:    -Admit to regional for 23hrs obs under Dr. Álvarez  -Regular diet  -Pain control prn  -Continue home meds  -Dr. Dela Cruz to see in am regarding possible bariatric surgery revision  -Am labs  -Encourage OOB, ambulation  -SCDs 56yo Female pt with PMHx Afib on Xarelto, CML on Imatinib (following oncologist at [facility]), and s/p sleeve gastrectomy in 2016 with Dr. Avitia at [facility], presents to ED c/o abdominal pain since yesterday. Patient indicates that she has had this pain before, but not as severe. She indicates that the hernia has been there for several years after her sleeve gastrectomy, but usually is reducible on it's own. Because of the severity of the pain, and her inability to reduced it, she decided to come the ED. Patient passing flatus, last BM this morning. Denies fever, chills, nausea, emesis, diarrhea, melena, hematochezia, hematemesis. Last colonoscopy/EGD - Never. Denies family hx of IBS, Crohn's, UC, or colon cancer. Surgery consulted for incarcerated ventral hernia. In the ED, pt afebrile, nontachycardic, normotensive, and satting on RA. Labs swbc 6.59, H/H 9.1/29.8, , Lactate 1.4, chem wnl. CT A/P with findings consistent with Redemonstration of a large 17 cm ventral wall hernia containing nonobstructed small bowel and colon. New fat stranding around the hernia neck may indicate incarceration; Recommend correlation with physical exam. Attemtped to reduce at bedside, but unable to do so. Due to current weight and patient without signs of obstruction, patient is not a candidate for ventral hernia repair, will need to lose weight, maybe revision of sleev for bypass, and then hernia repair, unless any changes in clinical status, or if patient became obstructed.    Plan:    -Admit to regional for 23hrs obs under Dr. Álvarez  -Regular diet  -Pain control prn  -Continue home meds  -Dr. Dela Cruz to see in am regarding possible bariatric surgery revision  -Am labs  -Encourage OOB, ambulation  -SCDs    CHIEF RESIDENT ADDENDUM  Agree with above. Delayed note while in the OR; consult resident discussed with attending surgeon directly. 57F with known ventral hernia after gastric bypass presents with inability to reduce her hernia and mild diffuse abdominal pain. No obstructive symptoms, no nausea/vomiting, passing flatus, having BMs, last today. VSS, exam with incarcerated ventral hernia. Regular diet, observation overnight, will discuss elective repair tomorrow morning.

## 2022-11-08 NOTE — ED PROVIDER NOTE - OBJECTIVE STATEMENT
58 y/o F pt with PMhx of Afib on Xarelto, CML on Imatinib (following oncologist at [facility]), and s/p sleeve gastrectomy in 2016 with Dr. Avitia at [facility], presents to ED c/o abdominal pain since yesterday. Pt relates having ventral hernia and saw PMD Dr. Zhu in office today before being sent to ED. She denies nausea, vomiting, diarrhea, fever, chills, burning urination, or any other acute complaints. Pt's last BM was yesterday and she is passing gas normally today.

## 2022-11-08 NOTE — ED ADULT NURSE NOTE - OBJECTIVE STATEMENT
Pt is a 58 y/o female A&Ox4 in NAD ambulatory with steady gait c/o abdominal pain. Hx of Hernia. Pt denies N/V/D, cough, fever/chills. Talking in clear, full sentences.

## 2022-11-09 ENCOUNTER — INPATIENT (INPATIENT)
Facility: HOSPITAL | Age: 57
LOS: 0 days | Discharge: LEFT BEFORE DISCHARGE | DRG: 394 | End: 2022-11-09
Attending: SURGERY | Admitting: SURGERY
Payer: COMMERCIAL

## 2022-11-09 DIAGNOSIS — Z98.890 OTHER SPECIFIED POSTPROCEDURAL STATES: Chronic | ICD-10-CM

## 2022-11-09 DIAGNOSIS — Z95.2 PRESENCE OF PROSTHETIC HEART VALVE: Chronic | ICD-10-CM

## 2022-11-09 PROCEDURE — 85025 COMPLETE CBC W/AUTO DIFF WBC: CPT

## 2022-11-09 PROCEDURE — 80053 COMPREHEN METABOLIC PANEL: CPT

## 2022-11-09 PROCEDURE — 99285 EMERGENCY DEPT VISIT HI MDM: CPT | Mod: 25

## 2022-11-09 PROCEDURE — 36415 COLL VENOUS BLD VENIPUNCTURE: CPT

## 2022-11-09 PROCEDURE — 74177 CT ABD & PELVIS W/CONTRAST: CPT | Mod: MA

## 2022-11-09 PROCEDURE — 87635 SARS-COV-2 COVID-19 AMP PRB: CPT

## 2022-11-09 PROCEDURE — 83605 ASSAY OF LACTIC ACID: CPT

## 2022-11-09 PROCEDURE — 96365 THER/PROPH/DIAG IV INF INIT: CPT

## 2022-11-09 PROCEDURE — 81001 URINALYSIS AUTO W/SCOPE: CPT

## 2022-11-09 PROCEDURE — 83690 ASSAY OF LIPASE: CPT

## 2022-11-09 RX ORDER — ALLOPURINOL 300 MG
100 TABLET ORAL AT BEDTIME
Refills: 0 | Status: DISCONTINUED | OUTPATIENT
Start: 2022-11-09 | End: 2022-11-09

## 2022-11-09 RX ORDER — SACUBITRIL AND VALSARTAN 24; 26 MG/1; MG/1
1 TABLET, FILM COATED ORAL
Refills: 0 | Status: DISCONTINUED | OUTPATIENT
Start: 2022-11-09 | End: 2022-11-09

## 2022-11-09 RX ORDER — IMATINIB MESYLATE 400 MG/1
300 TABLET, FILM COATED ORAL DAILY
Refills: 0 | Status: DISCONTINUED | OUTPATIENT
Start: 2022-11-09 | End: 2022-11-09

## 2022-11-09 RX ORDER — RIVAROXABAN 15 MG-20MG
20 KIT ORAL
Refills: 0 | Status: DISCONTINUED | OUTPATIENT
Start: 2022-11-09 | End: 2022-11-09

## 2022-11-09 RX ORDER — METOPROLOL TARTRATE 50 MG
12.5 TABLET ORAL
Refills: 0 | Status: DISCONTINUED | OUTPATIENT
Start: 2022-11-09 | End: 2022-11-09

## 2022-11-09 NOTE — ED ADULT NURSE REASSESSMENT NOTE - NS ED NURSE REASSESS COMMENT FT1
Patient stated that she does not want to be admitted, made aware of lab and imaging results and the importance of admission, verbalized understanding but still wants to leave. MD and RN at bedside for eval, patient's IV removed and educted on return precautions. Patient signed appropriate paperwork.
Surgery resident is at the bedside with patient for assessment.
Patient endorsed from prior shift and remains resting on the chair comfortably. Patient presented for abd abd pain, awaiting imaging results, made aware of plan at this time.

## 2022-11-15 DIAGNOSIS — C92.10 CHRONIC MYELOID LEUKEMIA, BCR/ABL-POSITIVE, NOT HAVING ACHIEVED REMISSION: ICD-10-CM

## 2022-11-15 DIAGNOSIS — K43.6 OTHER AND UNSPECIFIED VENTRAL HERNIA WITH OBSTRUCTION, WITHOUT GANGRENE: ICD-10-CM

## 2022-11-15 DIAGNOSIS — I48.91 UNSPECIFIED ATRIAL FIBRILLATION: ICD-10-CM

## 2022-11-20 ENCOUNTER — RX RENEWAL (OUTPATIENT)
Age: 57
End: 2022-11-20

## 2022-11-29 ENCOUNTER — APPOINTMENT (OUTPATIENT)
Dept: HEART AND VASCULAR | Facility: CLINIC | Age: 57
End: 2022-11-29

## 2022-11-29 ENCOUNTER — NON-APPOINTMENT (OUTPATIENT)
Age: 57
End: 2022-11-29

## 2022-11-29 VITALS
WEIGHT: 252 LBS | DIASTOLIC BLOOD PRESSURE: 74 MMHG | BODY MASS INDEX: 41.99 KG/M2 | HEIGHT: 65 IN | HEART RATE: 101 BPM | SYSTOLIC BLOOD PRESSURE: 108 MMHG

## 2022-11-29 PROCEDURE — 99214 OFFICE O/P EST MOD 30 MIN: CPT | Mod: 25

## 2022-11-29 PROCEDURE — 93000 ELECTROCARDIOGRAM COMPLETE: CPT

## 2022-11-29 RX ORDER — FUROSEMIDE 20 MG/1
20 TABLET ORAL
Qty: 45 | Refills: 0 | Status: DISCONTINUED | COMMUNITY
Start: 2022-02-27 | End: 2022-11-29

## 2022-11-29 RX ORDER — CIPROFLOXACIN HYDROCHLORIDE 500 MG/1
500 TABLET, FILM COATED ORAL
Qty: 20 | Refills: 0 | Status: DISCONTINUED | COMMUNITY
Start: 2022-08-07

## 2022-11-29 RX ORDER — CEPHALEXIN 500 MG/1
500 TABLET ORAL
Qty: 14 | Refills: 0 | Status: DISCONTINUED | COMMUNITY
Start: 2022-08-23

## 2022-11-29 NOTE — PHYSICAL EXAM
[Well Developed] : well developed [Well Nourished] : well nourished [No Acute Distress] : no acute distress [Obese] : obese [Normal Conjunctiva] : normal conjunctiva [Normal Venous Pressure] : normal venous pressure [No Carotid Bruit] : no carotid bruit [Clear Lung Fields] : clear lung fields [Good Air Entry] : good air entry [No Respiratory Distress] : no respiratory distress  [Soft] : abdomen soft [Non Tender] : non-tender [Normal Gait] : normal gait [No Edema] : no edema [No Varicosities] : no varicosities [No Rash] : no rash [Moves all extremities] : moves all extremities [Alert and Oriented] : alert and oriented [de-identified] : soft systolic murmur

## 2022-11-29 NOTE — DISCUSSION/SUMMARY
[FreeTextEntry1] : EKG:probable AT\par advised to f/u with EP and cont toprol for AT\par continue toprol + entresto for CM; Xarelto for PAF

## 2022-11-29 NOTE — HISTORY OF PRESENT ILLNESS
[FreeTextEntry1] : stable AGUILAR- no cp/palpitations \par BP runs low- no change in Sx since off Lasix

## 2022-12-06 ENCOUNTER — RX RENEWAL (OUTPATIENT)
Age: 57
End: 2022-12-06

## 2023-01-16 ENCOUNTER — RX RENEWAL (OUTPATIENT)
Age: 58
End: 2023-01-16

## 2023-04-03 ENCOUNTER — RX RENEWAL (OUTPATIENT)
Age: 58
End: 2023-04-03

## 2023-04-22 ENCOUNTER — RX RENEWAL (OUTPATIENT)
Age: 58
End: 2023-04-22

## 2023-05-08 NOTE — HISTORY OF PRESENT ILLNESS
May 8, 2023  EMPLOYEE INFORMATION: EMPLOYER INFORMATION:   NAME: Akshat SARGENT ( ALL SITES)   : 1966 [phone]    DATE OF INJURY/EVENT: 2023           [facility]   Treating Provider: Dionisio Camarillo MD  Time In:  10:28 AM Time Out:  12:41 PM      DIAGNOSIS:   1. Right shoulder pain, unspecified chronicity      STATUS: This injury is determined to be NOT WORK RELATED.    RETURN TO WORK:Employee may return to work with restrictions. Employee is discharged from care. Return Date: 2023           RESTRICTIONS: Restrictions are to be followed at work and at home.  Restrictions are in effect until next follow-up visit.    Even though you are not required to honor this request, please accept these restrictions for the next 2 weeks while the patient establishes care with a new provider of his choice and receives new restrictions.    No lifting, carrying, pushing or pulling more than 20 lbs with RIGHT shoulder/arm.  No work at or above shoulder height.  No high force, high repetitious, high pace work.  May try to do a little bit more each day as tolerated.  Apply ice and/or heat to affected area(s) per his discretion and 3 times per shift.  Maximum 20 minutes on, minimum 20 minutes off.  Do NOT apply the ice pack directly to the skin.  Do NOT apply the heat directly to the skin.   Do NOT use extreme heat.  Do NOT sleep with ice or heat applied to affected area(s).           TREATMENT PLAN:   Medications for this injury/condition:   OTC pain medications as needed.        Referral/Consult: None  Diagnostic Testing: XR SHOULDER 4 VW BILATERAL       Instructions: None     NEXT RETURN VISIT: None    Thank you for the privilege of providing medical care for this injury/condition.  If there are any questions, please call the occupational health clinic at Dept: [phone].      Electronically signed on 2023 at 12:41 PM by:   MD Isabella Adams  [facility]       [FreeTextEntry1] : started Gleevec for CML and developed bruising and fluid retention- had bruising. states breathing much better- can walk 5-6 blocks w/o Sx(no cp/sob)- just started yesterday on Lasix- she was told bruising and edema due to Gleevec and dosage reduced

## 2023-05-11 ENCOUNTER — APPOINTMENT (OUTPATIENT)
Dept: HEART AND VASCULAR | Facility: CLINIC | Age: 58
End: 2023-05-11
Payer: COMMERCIAL

## 2023-05-11 ENCOUNTER — NON-APPOINTMENT (OUTPATIENT)
Age: 58
End: 2023-05-11

## 2023-05-11 VITALS
HEART RATE: 90 BPM | DIASTOLIC BLOOD PRESSURE: 62 MMHG | HEIGHT: 65 IN | OXYGEN SATURATION: 97 % | TEMPERATURE: 97.1 F | SYSTOLIC BLOOD PRESSURE: 97 MMHG

## 2023-05-11 DIAGNOSIS — I36.1 NONRHEUMATIC TRICUSPID (VALVE) INSUFFICIENCY: ICD-10-CM

## 2023-05-11 PROCEDURE — 36415 COLL VENOUS BLD VENIPUNCTURE: CPT

## 2023-05-11 PROCEDURE — 99214 OFFICE O/P EST MOD 30 MIN: CPT | Mod: 25

## 2023-05-11 PROCEDURE — 93000 ELECTROCARDIOGRAM COMPLETE: CPT

## 2023-05-11 PROCEDURE — 93306 TTE W/DOPPLER COMPLETE: CPT

## 2023-05-11 NOTE — PHYSICAL EXAM
[Well Developed] : well developed [Well Nourished] : well nourished [No Acute Distress] : no acute distress [Obese] : obese [Normal Conjunctiva] : normal conjunctiva [Normal Venous Pressure] : normal venous pressure [No Carotid Bruit] : no carotid bruit [Clear Lung Fields] : clear lung fields [Good Air Entry] : good air entry [No Respiratory Distress] : no respiratory distress  [Soft] : abdomen soft [Non Tender] : non-tender [Normal Gait] : normal gait [No Edema] : no edema [No Varicosities] : no varicosities [No Rash] : no rash [Moves all extremities] : moves all extremities [Alert and Oriented] : alert and oriented [de-identified] : soft systolic murmur ;irregular

## 2023-05-11 NOTE — DISCUSSION/SUMMARY
[FreeTextEntry1] : EKG:AF\par TTE: LVEF improved ,55%\par TR/MR\par will check BNP for sanchez-if high will add diuretics\par continue Entersto + Toprol  for CM\par Xarelto for AF\par sob may be due to anemia\par reviewed albs, mild LDL elevation in LDL- diet

## 2023-05-12 LAB — NT-PROBNP SERPL-MCNC: 1136 PG/ML

## 2023-06-05 ENCOUNTER — APPOINTMENT (OUTPATIENT)
Dept: HEART AND VASCULAR | Facility: CLINIC | Age: 58
End: 2023-06-05
Payer: COMMERCIAL

## 2023-06-05 ENCOUNTER — NON-APPOINTMENT (OUTPATIENT)
Age: 58
End: 2023-06-05

## 2023-06-05 VITALS
HEART RATE: 104 BPM | WEIGHT: 252 LBS | DIASTOLIC BLOOD PRESSURE: 68 MMHG | OXYGEN SATURATION: 99 % | BODY MASS INDEX: 41.99 KG/M2 | SYSTOLIC BLOOD PRESSURE: 98 MMHG | TEMPERATURE: 97.8 F | HEIGHT: 65 IN

## 2023-06-05 DIAGNOSIS — D64.9 ANEMIA, UNSPECIFIED: ICD-10-CM

## 2023-06-05 PROCEDURE — 99214 OFFICE O/P EST MOD 30 MIN: CPT | Mod: 25

## 2023-06-05 PROCEDURE — 93000 ELECTROCARDIOGRAM COMPLETE: CPT

## 2023-06-05 PROCEDURE — 36415 COLL VENOUS BLD VENIPUNCTURE: CPT

## 2023-06-05 NOTE — DISCUSSION/SUMMARY
[FreeTextEntry1] : EKG:prob af(regular)\par continue Xarelto +Toprol for PAF;Lasix +entresto+Toprol for CHF\par check labs\par med refilled

## 2023-06-05 NOTE — PHYSICAL EXAM
[Well Developed] : well developed [Well Nourished] : well nourished [No Acute Distress] : no acute distress [Obese] : obese [Normal Conjunctiva] : normal conjunctiva [Normal Venous Pressure] : normal venous pressure [No Carotid Bruit] : no carotid bruit [Clear Lung Fields] : clear lung fields [Good Air Entry] : good air entry [No Respiratory Distress] : no respiratory distress  [Soft] : abdomen soft [Non Tender] : non-tender [Normal Gait] : normal gait [No Edema] : no edema [No Varicosities] : no varicosities [No Rash] : no rash [Moves all extremities] : moves all extremities [Alert and Oriented] : alert and oriented [de-identified] : soft systolic murmur ;irregular

## 2023-06-06 LAB
ANION GAP SERPL CALC-SCNC: 13 MMOL/L
BUN SERPL-MCNC: 23 MG/DL
CALCIUM SERPL-MCNC: 9.4 MG/DL
CHLORIDE SERPL-SCNC: 102 MMOL/L
CO2 SERPL-SCNC: 25 MMOL/L
CREAT SERPL-MCNC: 1.24 MG/DL
EGFR: 50 ML/MIN/1.73M2
GLUCOSE SERPL-MCNC: 113 MG/DL
HCT VFR BLD CALC: 31.2 %
HGB BLD-MCNC: 9.5 G/DL
MAGNESIUM SERPL-MCNC: 2.2 MG/DL
NT-PROBNP SERPL-MCNC: 1433 PG/ML
POTASSIUM SERPL-SCNC: 4.5 MMOL/L
SODIUM SERPL-SCNC: 140 MMOL/L

## 2023-07-17 NOTE — ED ADULT NURSE NOTE - NS ED NURSE IV DC DT
19-Sep-2021 07:05 Post-Care Instructions: I reviewed with the patient in detail post-care instructions. Patient is to keep the biopsy site dry overnight, and then apply bacitracin twice daily until healed. Patient may apply hydrogen peroxide soaks to remove any crusting.

## 2023-09-26 ENCOUNTER — RX RENEWAL (OUTPATIENT)
Age: 58
End: 2023-09-26

## 2023-10-19 ENCOUNTER — APPOINTMENT (OUTPATIENT)
Dept: HEART AND VASCULAR | Facility: CLINIC | Age: 58
End: 2023-10-19
Payer: COMMERCIAL

## 2023-10-19 VITALS
OXYGEN SATURATION: 97 % | DIASTOLIC BLOOD PRESSURE: 58 MMHG | WEIGHT: 252 LBS | SYSTOLIC BLOOD PRESSURE: 90 MMHG | TEMPERATURE: 97 F | BODY MASS INDEX: 41.99 KG/M2 | HEART RATE: 108 BPM | HEIGHT: 65 IN

## 2023-10-19 DIAGNOSIS — I47.19 OTHER SUPRAVENTRICULAR TACHYCARDIA: ICD-10-CM

## 2023-10-19 DIAGNOSIS — R00.2 PALPITATIONS: ICD-10-CM

## 2023-10-19 DIAGNOSIS — I48.0 PAROXYSMAL ATRIAL FIBRILLATION: ICD-10-CM

## 2023-10-19 PROCEDURE — 99214 OFFICE O/P EST MOD 30 MIN: CPT | Mod: 25

## 2023-10-19 PROCEDURE — 36415 COLL VENOUS BLD VENIPUNCTURE: CPT

## 2023-10-19 PROCEDURE — 93000 ELECTROCARDIOGRAM COMPLETE: CPT

## 2023-10-20 LAB
ALBUMIN SERPL ELPH-MCNC: 3.7 G/DL
ALP BLD-CCNC: 111 U/L
ALT SERPL-CCNC: 11 U/L
ANION GAP SERPL CALC-SCNC: 13 MMOL/L
AST SERPL-CCNC: 16 U/L
BILIRUB SERPL-MCNC: 0.3 MG/DL
BUN SERPL-MCNC: 25 MG/DL
CALCIUM SERPL-MCNC: 9.1 MG/DL
CHLORIDE SERPL-SCNC: 102 MMOL/L
CHOLEST SERPL-MCNC: 151 MG/DL
CO2 SERPL-SCNC: 24 MMOL/L
CREAT SERPL-MCNC: 1.27 MG/DL
EGFR: 48 ML/MIN/1.73M2
GLUCOSE SERPL-MCNC: 113 MG/DL
HDLC SERPL-MCNC: 32 MG/DL
LDLC SERPL CALC-MCNC: 92 MG/DL
NONHDLC SERPL-MCNC: 119 MG/DL
POTASSIUM SERPL-SCNC: 4.5 MMOL/L
PROT SERPL-MCNC: 7 G/DL
SODIUM SERPL-SCNC: 139 MMOL/L
TRIGL SERPL-MCNC: 153 MG/DL

## 2023-10-25 ENCOUNTER — NON-APPOINTMENT (OUTPATIENT)
Age: 58
End: 2023-10-25

## 2024-01-04 ENCOUNTER — APPOINTMENT (OUTPATIENT)
Dept: HEART AND VASCULAR | Facility: CLINIC | Age: 59
End: 2024-01-04
Payer: COMMERCIAL

## 2024-01-04 VITALS
HEIGHT: 72 IN | WEIGHT: 252 LBS | SYSTOLIC BLOOD PRESSURE: 101 MMHG | HEART RATE: 128 BPM | BODY MASS INDEX: 34.13 KG/M2 | DIASTOLIC BLOOD PRESSURE: 65 MMHG | OXYGEN SATURATION: 98 %

## 2024-01-04 DIAGNOSIS — E78.00 PURE HYPERCHOLESTEROLEMIA, UNSPECIFIED: ICD-10-CM

## 2024-01-04 DIAGNOSIS — R06.09 OTHER FORMS OF DYSPNEA: ICD-10-CM

## 2024-01-04 DIAGNOSIS — I48.91 UNSPECIFIED ATRIAL FIBRILLATION: ICD-10-CM

## 2024-01-04 DIAGNOSIS — I50.22 CHRONIC SYSTOLIC (CONGESTIVE) HEART FAILURE: ICD-10-CM

## 2024-01-04 DIAGNOSIS — I42.9 CARDIOMYOPATHY, UNSPECIFIED: ICD-10-CM

## 2024-01-04 PROCEDURE — 99215 OFFICE O/P EST HI 40 MIN: CPT | Mod: 25

## 2024-01-04 PROCEDURE — 93000 ELECTROCARDIOGRAM COMPLETE: CPT

## 2024-01-04 RX ORDER — ATORVASTATIN CALCIUM 40 MG/1
40 TABLET, FILM COATED ORAL
Refills: 0 | Status: ACTIVE | COMMUNITY

## 2024-01-04 RX ORDER — APIXABAN 5 MG/1
5 TABLET, FILM COATED ORAL
Refills: 0 | Status: ACTIVE | COMMUNITY

## 2024-01-04 RX ORDER — RIVAROXABAN 20 MG/1
20 TABLET, FILM COATED ORAL
Qty: 90 | Refills: 1 | Status: DISCONTINUED | COMMUNITY
Start: 2022-04-17 | End: 2024-01-04

## 2024-01-04 RX ORDER — SACUBITRIL AND VALSARTAN 24; 26 MG/1; MG/1
24-26 TABLET, FILM COATED ORAL
Qty: 180 | Refills: 1 | Status: DISCONTINUED | COMMUNITY
Start: 2021-06-08 | End: 2024-01-04

## 2024-01-04 NOTE — HISTORY OF PRESENT ILLNESS
[FreeTextEntry1] : post hospitalization was hospitalized with urosepsis and rapid AF- was on pressors  had R/L cath with dRCA disease. no cp- has sob and feels weak entresto stopped due to shock- was on amiodarone which was "trialed off"/?- anticoag was not interrupted

## 2024-01-04 NOTE — END OF VISIT
Pt called in requesting a call back to discuss today's appt. She can be reached at [phone]. [Time Spent: ___ minutes] : I have spent [unfilled] minutes of time on the encounter.

## 2024-01-04 NOTE — PHYSICAL EXAM
[Well Developed] : well developed [Well Nourished] : well nourished [No Acute Distress] : no acute distress [Obese] : obese [Normal Conjunctiva] : normal conjunctiva [Normal Venous Pressure] : normal venous pressure [No Carotid Bruit] : no carotid bruit [Clear Lung Fields] : clear lung fields [Good Air Entry] : good air entry [No Respiratory Distress] : no respiratory distress  [Soft] : abdomen soft [Non Tender] : non-tender [Normal Gait] : normal gait [No Edema] : no edema [No Varicosities] : no varicosities [No Rash] : no rash [Moves all extremities] : moves all extremities [Alert and Oriented] : alert and oriented [de-identified] : soft systolic murmur ;irregular

## 2024-01-04 NOTE — DISCUSSION/SUMMARY
[FreeTextEntry1] : EKG:rapid AF continue eliquis + toprol and restart amiodarone for AF continue spironolactone + lasix +Toprol for CM/CHF_ decrease lasix lipitor for HLD i discussed with dr hoffman who will see her next week- ? RFA/DCC would restart entresto once BP satbilized time spent included review of records( i [facility] to [facility]

## 2024-01-08 ENCOUNTER — INPATIENT (INPATIENT)
Facility: HOSPITAL | Age: 59
LOS: 0 days | Discharge: HOME | DRG: 918 | End: 2024-01-09
Attending: INTERNAL MEDICINE | Admitting: INTERNAL MEDICINE
Payer: COMMERCIAL

## 2024-01-08 VITALS
OXYGEN SATURATION: 99 % | DIASTOLIC BLOOD PRESSURE: 56 MMHG | WEIGHT: 250 LBS | HEART RATE: 54 BPM | RESPIRATION RATE: 16 BRPM | SYSTOLIC BLOOD PRESSURE: 106 MMHG

## 2024-01-08 DIAGNOSIS — C92.10 CHRONIC MYELOID LEUKEMIA, BCR/ABL-POSITIVE, NOT HAVING ACHIEVED REMISSION: ICD-10-CM

## 2024-01-08 DIAGNOSIS — N39.0 URINARY TRACT INFECTION, SITE NOT SPECIFIED: ICD-10-CM

## 2024-01-08 DIAGNOSIS — Z29.9 ENCOUNTER FOR PROPHYLACTIC MEASURES, UNSPECIFIED: ICD-10-CM

## 2024-01-08 DIAGNOSIS — Z98.890 OTHER SPECIFIED POSTPROCEDURAL STATES: Chronic | ICD-10-CM

## 2024-01-08 DIAGNOSIS — Z95.2 PRESENCE OF PROSTHETIC HEART VALVE: Chronic | ICD-10-CM

## 2024-01-08 DIAGNOSIS — Z86.79 PERSONAL HISTORY OF OTHER DISEASES OF THE CIRCULATORY SYSTEM: ICD-10-CM

## 2024-01-08 DIAGNOSIS — N17.9 ACUTE KIDNEY FAILURE, UNSPECIFIED: ICD-10-CM

## 2024-01-08 DIAGNOSIS — R00.1 BRADYCARDIA, UNSPECIFIED: ICD-10-CM

## 2024-01-08 DIAGNOSIS — I48.91 UNSPECIFIED ATRIAL FIBRILLATION: ICD-10-CM

## 2024-01-08 LAB
ANION GAP SERPL CALC-SCNC: 15 MMOL/L — SIGNIFICANT CHANGE UP (ref 5–17)
ANION GAP SERPL CALC-SCNC: 15 MMOL/L — SIGNIFICANT CHANGE UP (ref 5–17)
APPEARANCE UR: ABNORMAL
APPEARANCE UR: ABNORMAL
BACTERIA # UR AUTO: ABNORMAL /HPF
BACTERIA # UR AUTO: ABNORMAL /HPF
BILIRUB UR-MCNC: ABNORMAL
BILIRUB UR-MCNC: ABNORMAL
BUN SERPL-MCNC: 41 MG/DL — HIGH (ref 7–23)
BUN SERPL-MCNC: 41 MG/DL — HIGH (ref 7–23)
CALCIUM SERPL-MCNC: 8.8 MG/DL — SIGNIFICANT CHANGE UP (ref 8.4–10.5)
CALCIUM SERPL-MCNC: 8.8 MG/DL — SIGNIFICANT CHANGE UP (ref 8.4–10.5)
CAST: 4 /LPF — SIGNIFICANT CHANGE UP (ref 0–4)
CAST: 4 /LPF — SIGNIFICANT CHANGE UP (ref 0–4)
CHLORIDE SERPL-SCNC: 100 MMOL/L — SIGNIFICANT CHANGE UP (ref 96–108)
CHLORIDE SERPL-SCNC: 100 MMOL/L — SIGNIFICANT CHANGE UP (ref 96–108)
CO2 SERPL-SCNC: 19 MMOL/L — LOW (ref 22–31)
CO2 SERPL-SCNC: 19 MMOL/L — LOW (ref 22–31)
COLOR SPEC: SIGNIFICANT CHANGE UP
COLOR SPEC: SIGNIFICANT CHANGE UP
CREAT SERPL-MCNC: 1.81 MG/DL — HIGH (ref 0.5–1.3)
CREAT SERPL-MCNC: 1.81 MG/DL — HIGH (ref 0.5–1.3)
DIFF PNL FLD: ABNORMAL
DIFF PNL FLD: ABNORMAL
EGFR: 32 ML/MIN/1.73M2 — LOW
EGFR: 32 ML/MIN/1.73M2 — LOW
FINE GRAN CASTS #/AREA URNS AUTO: PRESENT
FINE GRAN CASTS #/AREA URNS AUTO: PRESENT
GLUCOSE SERPL-MCNC: 180 MG/DL — HIGH (ref 70–99)
GLUCOSE SERPL-MCNC: 180 MG/DL — HIGH (ref 70–99)
GLUCOSE UR QL: NEGATIVE MG/DL — SIGNIFICANT CHANGE UP
GLUCOSE UR QL: NEGATIVE MG/DL — SIGNIFICANT CHANGE UP
HCT VFR BLD CALC: 29.1 % — LOW (ref 34.5–45)
HCT VFR BLD CALC: 29.1 % — LOW (ref 34.5–45)
HGB BLD-MCNC: 8.8 G/DL — LOW (ref 11.5–15.5)
HGB BLD-MCNC: 8.8 G/DL — LOW (ref 11.5–15.5)
KETONES UR-MCNC: ABNORMAL MG/DL
KETONES UR-MCNC: ABNORMAL MG/DL
LACTATE SERPL-SCNC: 4.3 MMOL/L — CRITICAL HIGH (ref 0.5–2)
LACTATE SERPL-SCNC: 4.3 MMOL/L — CRITICAL HIGH (ref 0.5–2)
LACTATE SERPL-SCNC: 4.9 MMOL/L — CRITICAL HIGH (ref 0.5–2)
LACTATE SERPL-SCNC: 4.9 MMOL/L — CRITICAL HIGH (ref 0.5–2)
LEUKOCYTE ESTERASE UR-ACNC: ABNORMAL
LEUKOCYTE ESTERASE UR-ACNC: ABNORMAL
MCHC RBC-ENTMCNC: 30.2 GM/DL — LOW (ref 32–36)
MCHC RBC-ENTMCNC: 30.2 GM/DL — LOW (ref 32–36)
MCHC RBC-ENTMCNC: 31.7 PG — SIGNIFICANT CHANGE UP (ref 27–34)
MCHC RBC-ENTMCNC: 31.7 PG — SIGNIFICANT CHANGE UP (ref 27–34)
MCV RBC AUTO: 104.7 FL — HIGH (ref 80–100)
MCV RBC AUTO: 104.7 FL — HIGH (ref 80–100)
NITRITE UR-MCNC: NEGATIVE — SIGNIFICANT CHANGE UP
NITRITE UR-MCNC: NEGATIVE — SIGNIFICANT CHANGE UP
NRBC # BLD: 0 /100 WBCS — SIGNIFICANT CHANGE UP (ref 0–0)
NRBC # BLD: 0 /100 WBCS — SIGNIFICANT CHANGE UP (ref 0–0)
PH UR: 5.5 — SIGNIFICANT CHANGE UP (ref 5–8)
PH UR: 5.5 — SIGNIFICANT CHANGE UP (ref 5–8)
PLATELET # BLD AUTO: 304 K/UL — SIGNIFICANT CHANGE UP (ref 150–400)
PLATELET # BLD AUTO: 304 K/UL — SIGNIFICANT CHANGE UP (ref 150–400)
POTASSIUM SERPL-MCNC: 4 MMOL/L — SIGNIFICANT CHANGE UP (ref 3.5–5.3)
POTASSIUM SERPL-MCNC: 4 MMOL/L — SIGNIFICANT CHANGE UP (ref 3.5–5.3)
POTASSIUM SERPL-SCNC: 4 MMOL/L — SIGNIFICANT CHANGE UP (ref 3.5–5.3)
POTASSIUM SERPL-SCNC: 4 MMOL/L — SIGNIFICANT CHANGE UP (ref 3.5–5.3)
PROT UR-MCNC: 300 MG/DL
PROT UR-MCNC: 300 MG/DL
RBC # BLD: 2.78 M/UL — LOW (ref 3.8–5.2)
RBC # BLD: 2.78 M/UL — LOW (ref 3.8–5.2)
RBC # FLD: 14.7 % — HIGH (ref 10.3–14.5)
RBC # FLD: 14.7 % — HIGH (ref 10.3–14.5)
RBC CASTS # UR COMP ASSIST: 10 /HPF — HIGH (ref 0–4)
RBC CASTS # UR COMP ASSIST: 10 /HPF — HIGH (ref 0–4)
SODIUM SERPL-SCNC: 134 MMOL/L — LOW (ref 135–145)
SODIUM SERPL-SCNC: 134 MMOL/L — LOW (ref 135–145)
SP GR SPEC: 1.02 — SIGNIFICANT CHANGE UP (ref 1–1.03)
SP GR SPEC: 1.02 — SIGNIFICANT CHANGE UP (ref 1–1.03)
SQUAMOUS # UR AUTO: 4 /HPF — SIGNIFICANT CHANGE UP (ref 0–5)
SQUAMOUS # UR AUTO: 4 /HPF — SIGNIFICANT CHANGE UP (ref 0–5)
TROPONIN T, HIGH SENSITIVITY RESULT: 26 NG/L — SIGNIFICANT CHANGE UP (ref 0–51)
TROPONIN T, HIGH SENSITIVITY RESULT: 26 NG/L — SIGNIFICANT CHANGE UP (ref 0–51)
UROBILINOGEN FLD QL: 1 MG/DL — SIGNIFICANT CHANGE UP (ref 0.2–1)
UROBILINOGEN FLD QL: 1 MG/DL — SIGNIFICANT CHANGE UP (ref 0.2–1)
WBC # BLD: 8.4 K/UL — SIGNIFICANT CHANGE UP (ref 3.8–10.5)
WBC # BLD: 8.4 K/UL — SIGNIFICANT CHANGE UP (ref 3.8–10.5)
WBC # FLD AUTO: 8.4 K/UL — SIGNIFICANT CHANGE UP (ref 3.8–10.5)
WBC # FLD AUTO: 8.4 K/UL — SIGNIFICANT CHANGE UP (ref 3.8–10.5)
WBC UR QL: 366 /HPF — HIGH (ref 0–5)
WBC UR QL: 366 /HPF — HIGH (ref 0–5)

## 2024-01-08 PROCEDURE — 71045 X-RAY EXAM CHEST 1 VIEW: CPT | Mod: 26

## 2024-01-08 PROCEDURE — 99291 CRITICAL CARE FIRST HOUR: CPT

## 2024-01-08 PROCEDURE — 99233 SBSQ HOSP IP/OBS HIGH 50: CPT | Mod: GC

## 2024-01-08 PROCEDURE — 99223 1ST HOSP IP/OBS HIGH 75: CPT

## 2024-01-08 PROCEDURE — 93306 TTE W/DOPPLER COMPLETE: CPT | Mod: 26

## 2024-01-08 RX ORDER — SODIUM CHLORIDE 9 MG/ML
1000 INJECTION INTRAMUSCULAR; INTRAVENOUS; SUBCUTANEOUS ONCE
Refills: 0 | Status: COMPLETED | OUTPATIENT
Start: 2024-01-08 | End: 2024-01-08

## 2024-01-08 RX ORDER — ATORVASTATIN CALCIUM 80 MG/1
40 TABLET, FILM COATED ORAL AT BEDTIME
Refills: 0 | Status: DISCONTINUED | OUTPATIENT
Start: 2024-01-08 | End: 2024-01-09

## 2024-01-08 RX ORDER — IMATINIB MESYLATE 400 MG/1
300 TABLET, FILM COATED ORAL
Qty: 0 | Refills: 0 | DISCHARGE

## 2024-01-08 RX ORDER — ALLOPURINOL 300 MG
1 TABLET ORAL
Qty: 0 | Refills: 0 | DISCHARGE

## 2024-01-08 RX ORDER — ATORVASTATIN CALCIUM 80 MG/1
1 TABLET, FILM COATED ORAL
Refills: 0 | DISCHARGE

## 2024-01-08 RX ORDER — CEFTRIAXONE 500 MG/1
1000 INJECTION, POWDER, FOR SOLUTION INTRAMUSCULAR; INTRAVENOUS ONCE
Refills: 0 | Status: COMPLETED | OUTPATIENT
Start: 2024-01-08 | End: 2024-01-08

## 2024-01-08 RX ORDER — SACUBITRIL AND VALSARTAN 24; 26 MG/1; MG/1
1 TABLET, FILM COATED ORAL
Qty: 0 | Refills: 0 | DISCHARGE

## 2024-01-08 RX ORDER — METOCLOPRAMIDE HCL 10 MG
10 TABLET ORAL ONCE
Refills: 0 | Status: DISCONTINUED | OUTPATIENT
Start: 2024-01-08 | End: 2024-01-08

## 2024-01-08 RX ORDER — HEPARIN SODIUM 5000 [USP'U]/ML
5000 INJECTION INTRAVENOUS; SUBCUTANEOUS EVERY 8 HOURS
Refills: 0 | Status: DISCONTINUED | OUTPATIENT
Start: 2024-01-08 | End: 2024-01-08

## 2024-01-08 RX ORDER — INFLUENZA VIRUS VACCINE 15; 15; 15; 15 UG/.5ML; UG/.5ML; UG/.5ML; UG/.5ML
0.5 SUSPENSION INTRAMUSCULAR ONCE
Refills: 0 | Status: DISCONTINUED | OUTPATIENT
Start: 2024-01-08 | End: 2024-01-09

## 2024-01-08 RX ORDER — ONDANSETRON 8 MG/1
4 TABLET, FILM COATED ORAL EVERY 8 HOURS
Refills: 0 | Status: DISCONTINUED | OUTPATIENT
Start: 2024-01-08 | End: 2024-01-09

## 2024-01-08 RX ORDER — ACETAMINOPHEN 500 MG
650 TABLET ORAL EVERY 6 HOURS
Refills: 0 | Status: DISCONTINUED | OUTPATIENT
Start: 2024-01-08 | End: 2024-01-09

## 2024-01-08 RX ORDER — ASPIRIN/CALCIUM CARB/MAGNESIUM 324 MG
1 TABLET ORAL
Refills: 0 | DISCHARGE

## 2024-01-08 RX ORDER — IMATINIB MESYLATE 400 MG/1
300 TABLET, FILM COATED ORAL DAILY
Refills: 0 | Status: DISCONTINUED | OUTPATIENT
Start: 2024-01-08 | End: 2024-01-09

## 2024-01-08 RX ORDER — LANOLIN ALCOHOL/MO/W.PET/CERES
3 CREAM (GRAM) TOPICAL AT BEDTIME
Refills: 0 | Status: DISCONTINUED | OUTPATIENT
Start: 2024-01-08 | End: 2024-01-09

## 2024-01-08 RX ORDER — CEFTRIAXONE 500 MG/1
2000 INJECTION, POWDER, FOR SOLUTION INTRAMUSCULAR; INTRAVENOUS EVERY 24 HOURS
Refills: 0 | Status: DISCONTINUED | OUTPATIENT
Start: 2024-01-09 | End: 2024-01-09

## 2024-01-08 RX ORDER — ASPIRIN/CALCIUM CARB/MAGNESIUM 324 MG
81 TABLET ORAL DAILY
Refills: 0 | Status: DISCONTINUED | OUTPATIENT
Start: 2024-01-08 | End: 2024-01-09

## 2024-01-08 RX ORDER — ONDANSETRON 8 MG/1
4 TABLET, FILM COATED ORAL ONCE
Refills: 0 | Status: COMPLETED | OUTPATIENT
Start: 2024-01-08 | End: 2024-01-08

## 2024-01-08 RX ORDER — ALLOPURINOL 300 MG
100 TABLET ORAL DAILY
Refills: 0 | Status: DISCONTINUED | OUTPATIENT
Start: 2024-01-08 | End: 2024-01-09

## 2024-01-08 RX ORDER — RIVAROXABAN 15 MG-20MG
1 KIT ORAL
Qty: 0 | Refills: 0 | DISCHARGE

## 2024-01-08 RX ORDER — METOCLOPRAMIDE HCL 10 MG
10 TABLET ORAL ONCE
Refills: 0 | Status: COMPLETED | OUTPATIENT
Start: 2024-01-08 | End: 2024-01-08

## 2024-01-08 RX ORDER — ENOXAPARIN SODIUM 100 MG/ML
40 INJECTION SUBCUTANEOUS EVERY 24 HOURS
Refills: 0 | Status: DISCONTINUED | OUTPATIENT
Start: 2024-01-08 | End: 2024-01-08

## 2024-01-08 RX ORDER — APIXABAN 2.5 MG/1
5 TABLET, FILM COATED ORAL EVERY 12 HOURS
Refills: 0 | Status: DISCONTINUED | OUTPATIENT
Start: 2024-01-08 | End: 2024-01-09

## 2024-01-08 RX ADMIN — SODIUM CHLORIDE 1000 MILLILITER(S): 9 INJECTION INTRAMUSCULAR; INTRAVENOUS; SUBCUTANEOUS at 09:29

## 2024-01-08 RX ADMIN — SODIUM CHLORIDE 1000 MILLILITER(S): 9 INJECTION INTRAMUSCULAR; INTRAVENOUS; SUBCUTANEOUS at 09:13

## 2024-01-08 RX ADMIN — Medication 81 MILLIGRAM(S): at 18:50

## 2024-01-08 RX ADMIN — CEFTRIAXONE 100 MILLIGRAM(S): 500 INJECTION, POWDER, FOR SOLUTION INTRAMUSCULAR; INTRAVENOUS at 09:28

## 2024-01-08 RX ADMIN — ONDANSETRON 4 MILLIGRAM(S): 8 TABLET, FILM COATED ORAL at 09:15

## 2024-01-08 RX ADMIN — Medication 100 MILLIGRAM(S): at 18:50

## 2024-01-08 RX ADMIN — ATORVASTATIN CALCIUM 40 MILLIGRAM(S): 80 TABLET, FILM COATED ORAL at 21:59

## 2024-01-08 RX ADMIN — APIXABAN 5 MILLIGRAM(S): 2.5 TABLET, FILM COATED ORAL at 18:50

## 2024-01-08 RX ADMIN — Medication 10 MILLIGRAM(S): at 10:51

## 2024-01-08 RX ADMIN — SODIUM CHLORIDE 1000 MILLILITER(S): 9 INJECTION INTRAMUSCULAR; INTRAVENOUS; SUBCUTANEOUS at 13:47

## 2024-01-08 NOTE — H&P ADULT - NSHPPHYSICALEXAM_GEN_ALL_CORE
.  VITAL SIGNS:  T(C): 36.9 (01-08-24 @ 09:07), Max: 36.9 (01-08-24 @ 09:07)  T(F): 98.5 (01-08-24 @ 09:07), Max: 98.5 (01-08-24 @ 09:07)  HR: 55 (01-08-24 @ 15:39) (48 - 55)  BP: 115/- (01-08-24 @ 15:39) (93/54 - 159/73)  BP(mean): 77 (01-08-24 @ 11:29) (77 - 77)  RR: 17 (01-08-24 @ 15:39) (16 - 18)  SpO2: 100% (01-08-24 @ 15:39) (97% - 100%)  Wt(kg): --    PHYSICAL EXAM:    Constitutional: WDWN resting comfortably in bed; NAD  Head: NC/AT  Eyes: PERRL, EOMI, anicteric sclera  ENT: no nasal discharge; uvula midline, no oropharyngeal erythema or exudates; MMM  Neck: supple; no JVD or thyromegaly  Respiratory: CTA B/L; no W/R/R, no retractions  Cardiac: +S1/S2; RRR; +sytolic murmur   Gastrointestinal: abdomen soft, NT/ND; +palpable hernia in LLQ  Extremities: WWP, no clubbing or cyanosis; +1 pitting edema   Musculoskeletal: NROM x4; no joint swelling, tenderness or erythema  Neurologic: AAOx3; no focal deficits

## 2024-01-08 NOTE — ED PROVIDER NOTE - PHYSICAL EXAMINATION
VITAL SIGNS: I have reviewed nursing notes and confirm.  CONSTITUTIONAL:  ill appearing, pale  SKIN:  warm and dry, no acute rash.   HEAD:  normocephalic, atraumatic.  EYES: PERRL, EOM intact; conjunctiva and sclera clear.  ENT: No nasal discharge; airway clear. dry lips  NECK: Supple; non tender.  CARD: S1, S2 normal; no murmurs, gallops, or rubs. Slow, regular rate and rhythm.   RESP:  Clear to auscultation b/l, no wheezes, rales or rhonchi.  ABD: Normal bowel sounds; soft; non-distended; non-tender; no guarding/ rebound.  MSK: Normal ROM. No clubbing, cyanosis or edema. no ttp bilat le  NEURO: Alert, oriented, grossly unremarkable  PSYCH: Cooperative, mood and affect appropriate.

## 2024-01-08 NOTE — ED ADULT NURSE NOTE - NSFALLHARMRISKINTERV_ED_ALL_ED
Assistance OOB with selected safe patient handling equipment if applicable/Assistance with ambulation/Communicate risk of Fall with Harm to all staff, patient, and family/Provide visual cue: red socks, yellow wristband, yellow gown, etc/Reinforce activity limits and safety measures with patient and family/Bed in lowest position, wheels locked, appropriate side rails in place/Call bell, personal items and telephone in reach/Instruct patient to call for assistance before getting out of bed/chair/stretcher/Non-slip footwear applied when patient is off stretcher/[place] to call system/Physically safe environment - no spills, clutter or unnecessary equipment/Purposeful Proactive Rounding/Room/bathroom lighting operational, light cord in reach Assistance OOB with selected safe patient handling equipment if applicable/Assistance with ambulation/Communicate risk of Fall with Harm to all staff, patient, and family/Provide visual cue: red socks, yellow wristband, yellow gown, etc/Reinforce activity limits and safety measures with patient and family/Bed in lowest position, wheels locked, appropriate side rails in place/Call bell, personal items and telephone in reach/Instruct patient to call for assistance before getting out of bed/chair/stretcher/Non-slip footwear applied when patient is off stretcher/[place] to call system/Physically safe environment - no spills, clutter or unnecessary equipment/Purposeful Proactive Rounding/Room/bathroom lighting operational, light cord in reach

## 2024-01-08 NOTE — H&P ADULT - ATTENDING COMMENTS
Pt is 59 yo woman with A. Fib, on AC, Aortic stenosis, s/p valve replacement, recent urosepsis, admitted with bradycardia and hypotension in the setting of recent initiation of amiodarone and continuation of beta blocker therapy.   ---currently pt is off amiodarone and beta blockers, VS to be done [id number]  ---EKG with prolonged qTc to 550. Likely sinus  but can't r/out accelerated junctional rhythm. Would get cardiology input if patient needs monitoring   ---for pyuria with mild suprapubic discomfort pt is on Ceftriaxone therapy and cultures to be followed Pt is 59 yo woman with A. Fib, on AC, Aortic stenosis, s/p valve replacement, recent urosepsis, admitted with bradycardia and hypotension in the setting of recent initiation of amiodarone and continuation of beta blocker therapy.   ---currently pt is off amiodarone and beta blockers, VS to be done [id number]  ---EKG with prolonged qTc to 550. Likely sinus  but can't r/out accelerated junctional rhythm. Would get cardiology input if patient needs monitoring   ---for pyuria with mild suprapubic discomfort pt is on Ceftriaxone therapy and cultures to be followed

## 2024-01-08 NOTE — CONSULT NOTE ADULT - ASSESSMENT
68 y/o F with morbid obesity, CML (in remission), bioprosthetic AVR, CMP with LVEF 40%, and AFIB / flutter.  She had AFlutter ablation at [facility] in 2/2020, and PVI with atypical aflutter ablation at [facility] on 2021 with Dr. Nolen.  Pt has recent AF RVR in setting of urosepsis (admitted to [facility]).  She was started on Amio load outpatient on 1/4/24 along with her Metoprolol  mg bid.    - Rhythm strips and EKG from EMS and ER were reviewed.  She has sinus  with occasional junctional rhythm with overall HR in the high 30-50.  This is likely iatrogenic (from amio load and too much Metoprolol). Would hold off Amio and Bb today.  Continue workup for possible UTI.    68 y/o F with morbid obesity, CML (in remission), bioprosthetic AVR, CMP with LVEF 40%, and AFIB / flutter.  She had AFlutter ablation at [facility] in 2/2020, and PVI with atypical aflutter ablation at [facility] on 2021 with Dr. Nolen.  Pt has recent AF RVR in setting of urosepsis (admitted to [facility]).  She was started on Amio load outpatient on 1/4/24 along with her Metoprolol  mg bid.    - Rhythm strips and EKG from EMS and ER were reviewed.  She has sinus  with occasional junctional rhythm with overall HR in the high 30-50.  This is likely iatrogenic (from amio load and too much Metoprolol). Would hold off Amio and Bb today.  Continue workup for possible UTI.    66 y/o F with morbid obesity, CML (in remission), bioprosthetic AVR, CMP with LVEF 40%, and AFIB / flutter.  She had AFlutter ablation at [facility] in 2/2020, and PVI with atypical aflutter ablation at [facility] on 2021 with Dr. Nolen.  Pt has recent AF RVR in setting of urosepsis (admitted to [facility]).  She was started on Amio load outpatient on 1/4/24 along with her Metoprolol  mg bid.    - Rhythm strips and EKG from EMS and ER were reviewed.  She has sinus  with occasional junctional rhythm with overall HR in the high 30-50.  This is likely iatrogenic (from amio load and too much Metoprolol). Would hold off Amio and Bb today.  Continue workup for possible UTI/urosepsis.   - d/w Dr. Nolen  66 y/o F with morbid obesity, CML (in remission), bioprosthetic AVR, CMP with LVEF 40%, and AFIB / flutter.  She had AFlutter ablation at [facility] in 2/2020, and PVI with atypical aflutter ablation at [facility] on 2021 with Dr. Nolen.  Pt has recent AF RVR in setting of urosepsis (admitted to [facility]).  She was started on Amio load outpatient on 1/4/24 along with her Metoprolol  mg bid.    - Rhythm strips and EKG from EMS and ER were reviewed.  She has sinus  with occasional junctional rhythm with overall HR in the high 30-50.  This is likely iatrogenic (from amio load and too much Metoprolol). Would hold off Amio and Bb today.  Continue workup for possible UTI/urosepsis.   - d/w Dr. Nolen

## 2024-01-08 NOTE — H&P ADULT - PROBLEM SELECTOR PLAN 5
-Continue with home imatinib 300 mg PO Qd and allopurinol 100 mg qd H/o og A-fib status post ablation, aortic stenosis status post aortic valve replacement, heart failure (echo 10/23 with a EF 40%, AV VR in place, no effusion per her cardiologist Dr Nava)  Home meds: Aspirin 81 mg, Atorvastatin 40 mg qd, furosemide 40 mg qd, metoprolol 100 BID, Spironolactone 25 mg qd, Amioderone 400 BID    Plan:   -Hold BB, Amio and diuretics for now i/s/o of hypotension and RHODA  -Cont. Aspirin, Atorvastatin

## 2024-01-08 NOTE — H&P ADULT - PROBLEM SELECTOR PLAN 3
Patient with a longstanding history of atrial fibrillation refractory to ablation, bioprosthetic AVR, CMP with LVEF 40%, and AFIB / flutter.  She had AFlutter ablation at [facility] in 2/2020, and PVI with atypical aflutter ablation at [facility] on 2021 with Dr. Nolen with recent admission to [facility] for urosepsis and rapid AFib.   Home meds: Eliquis 5 mg BID, Amiodarone 400 BID load since 1/4/24, Metoprolol 100 BID    Plan:  -Hold BB and Amioderone in the setting of bradycardia   -Cont. Eliquis  -Daily EKGS Patient with a longstanding history of atrial fibrillation refractory to ablation, bioprosthetic AVR, CMP with LVEF 40%, and AFIB / flutter.  She had AFlutter ablation at [facility] in 2/2020, and PVI with atypical aflutter ablation at [facility] on 2021 with Dr. Nolen with recent admission to [facility] for urosepsis and rapid AFib.   Home meds: Eliquis 5 mg BID, Amiodarone 400 BID load since 1/4/24, Metoprolol 100 BID    Plan:  -Hold BB and Amioderone in the setting of bradycardia   -Cont. Eliquis  -Daily EKGS Pt with Cr 1.81 (baseline .9-1)  Likely 2/2 to hypotension  vs uti    Plan:  -f/u urine luytes  -Cont to trend  -Avoid nephrotoxic meds Pt with Cr 1.81 (baseline .9-1)  Likely 2/2 to hypotension  vs uti    Plan:  -f/u urine lytes  -Cont to trend  -Avoid nephrotoxic meds

## 2024-01-08 NOTE — ED PROVIDER NOTE - CLINICAL SUMMARY MEDICAL DECISION MAKING FREE TEXT BOX
patient brought in by EMS for bradycardia and hypotension.  Patient with sinus bradycardia on her EKG and the monitor and blood pressure initially soft but improving in the ER.  Patient status post atropine and amiodarone with EMS.  EKG without ischemic changes, no complaints of chest pain.  Patient with recent medication changes-question if this is due to her medications.  Patient also with recent urosepsis.  Patient denying symptoms to suggest infection but question if patient's hypotension is related to sepsis.  Plan for labs, cultures, IV fluids, cardiac monitor.  Reassess. See progress notes for further mdm related documentation.

## 2024-01-08 NOTE — CONSULT NOTE ADULT - SUBJECTIVE AND OBJECTIVE BOX
Electrophysiology Consult Note:     CHIEF COMPLAINT:  Patient is a 58y old  Female who presents with a chief complaint of       HISTORY OF PRESENT ILLNESS:   66 y/o F with morbid obesity, CML (in remission), bioprosthetic AVR, CMP with LVEF 40%, and AFIB / flutter.  She had AFlutter ablation at [facility] in 2020, and PVI with atypical aflutter ablation at [facility] on  with Dr. Nolen.  She recently was admitted to [facility] for 10 days in December for urosepsis.  Had AFIB at that time in setting of urosepsis.  She followed up with Dr. Nava last 24 when she was still in AFIB.  She was prescribed Amio 400 mg BID load for 5 days and was supposed to decrease on Metoprolol ER from 100 mg BID to 50 mg BID.  However she didn't decrease her Metoprolol.  She states called EMS today because she felt very weak over the weekend.    Per EMS, her HR was in the 30-40s bpm with hypotension (70mmgh systolic). Was given IVF.  Lactate here 4.9 with repeat 4.3 after fluid.    EPS is called to see her for  and questionable AFIB in the ER.    Currently still weak but not as bad as earlier. No chest pain. No syncope or SOB.  Has some dysuria which she attribute to yeast infection.   She is taking Eliquis       PAST MEDICAL & SURGICAL HISTORY:  H/O aortic valve stenosis      Congenital polycystic kidney      Obesity      Atrial fibrillation and flutter      Afib      CML (chronic myelocytic leukemia)      History of cardiac radiofrequency ablation  2020 for afib/flutter      History of gastric surgery      H/O aortic valve replacement   at [facility]          FAMILY HISTORY:      SOCIAL HISTORY:    no etoh/ tob / illicit drug    Allergies    penicillin (Unknown)    Intolerances    HOME MEDS:   Eliquis 5 mg bid  Amio 200 mg 2 tab BID (for 5 days then amio 200 mg daily thereafter)  Metoprolol  mg bid  Lasix 40 mg daily  SPironolactone  ASA  Allopurinol  Imatinib  lipitor        REVIEW OF SYSTEMS:  CONSTITUTIONAL: + fatigue. Denies fever / chills  EYES: No eye pain, visual disturbances, or discharge  ENMT:  No difficulty hearing, tinnitus, vertigo; No sinus or throat pain  NECK: No pain or stiffness  RESPIRATORY: No cough, wheezing, chills or hemoptysis; No Shortness of Breath  CARDIOVASCULAR: See hpi.   GASTROINTESTINAL: No abdominal or epigastric pain. No nausea, vomiting, or hematemesis; No diarrhea or constipation. No melena or hematochezia.  GENITOURINARY: Some dysuria.  NEUROLOGICAL: No headaches, memory loss, loss of strength, numbness, or tremors  SKIN: No itching, burning, rashes, or lesions   LYMPH Nodes: No enlarged glands  ENDOCRINE: No heat or cold intolerance; No hair loss  MUSCULOSKELETAL: No joint pain or swelling; No muscle, back, or extremity pain  PSYCHIATRIC: No depression, anxiety, mood swings, or difficulty sleeping  HEME/LYMPH: No easy bruising, or bleeding gums  ALLERY AND IMMUNOLOGIC: No hives or eczema	      PHYSICAL EXAM:  Vital Signs Last 24 Hrs  T(C): 36.9 (2024 09:07), Max: 36.9 (2024 09:07)  T(F): 98.5 (2024 09:07), Max: 98.5 (2024 09:07)  HR: 52 (2024 12:43) (48 - 54)  BP: 100/55 (2024 12:43) (93/54 - 159/73)  BP(mean): 77 (2024 11:29) (77 - 77)  RR: 16 (2024 12:43) (16 - 18)  SpO2: 97% (2024 12:43) (97% - 99%)    Parameters below as of 2024 12:43  Patient On (Oxygen Delivery Method): room air    Constitutional: NAD but appears very tired	  Neck: No JVD  CVS: Normal S1 / S2, , No murmurs  Pulm: CTA. No wheeze or rale  GI:  + BS, soft, NT / ND   Ext: No LE edema  Neurologic: A&O x 3, Non-focal  Psych: Pleasant, has good insight  Skin: No rash or lesion       	  LABS:	                         8.8    8.40  )-----------( 304      ( 2024 08:25 )             29.1     01-08    134<L>  |  100  |  41<H>  ----------------------------<  180<H>  4.0   |  19<L>  |  1.81<H>    Ca    8.8      2024 08:25    EK24 (outpatient) AFIB 128 bpm.   EK24 (ER): Junctional rhythm 52 bpm.   EMS rhythm strips and EKGs: junctional / sinus  with HR ~39 bpm on initial rhythm strip.     Telemetry in ER:  Sinus  currently HR 47-50 bpm at rest.     < from: TTE Echo Complete w/o Contrast w/ Doppler (24 @ 10:18) >   1. Technically difficult study.   2. Abnormal septal motion seen due to previous cardiac surgery. Left   ventricular endocardium is not well visualized. Grossly, left ventricular   function appears normal.   3. Mildly dilated right ventricular size with mildly reduced right   ventricular systolic function.   4. Biatrial enlargement.   5. A bioprosthetic valve noted in the aortic position. Leaflets appear   thickened.The peak transvalvular velocity is 3.30 m/s, the mean   transvalvular gradient is 26.00 mmHg, and the LVOT/AV velocity ratio is   0.19.   6. No pericardial effusion.   7. Left pleural effusion.   8. Compared to the previous ADALGISA performed on 2021, the peak and   mean gradients across the aortic valve were previously reported to be 25   mm Hg and 13 mm Hg, respectively.   9. Leaflets appear thickened.         Electrophysiology Consult Note:     CHIEF COMPLAINT:  Patient is a 58y old  Female who presents with a chief complaint of       HISTORY OF PRESENT ILLNESS:   68 y/o F with morbid obesity, CML (in remission), bioprosthetic AVR, CMP with LVEF 40%, and AFIB / flutter.  She had AFlutter ablation at [facility] in 2020, and PVI with atypical aflutter ablation at [facility] on  with Dr. Nolen.  She recently was admitted to [facility] for 10 days in December for urosepsis.  Had AFIB at that time in setting of urosepsis.  She followed up with Dr. Nava last 24 when she was still in AFIB.  She was prescribed Amio 400 mg BID load for 5 days and was supposed to decrease on Metoprolol ER from 100 mg BID to 50 mg BID.  However she didn't decrease her Metoprolol.  She states called EMS today because she felt very weak over the weekend.    Per EMS, her HR was in the 30-40s bpm with hypotension (70mmgh systolic). Was given IVF.  Lactate here 4.9 with repeat 4.3 after fluid.    EPS is called to see her for  and questionable AFIB in the ER.    Currently still weak but not as bad as earlier. No chest pain. No syncope or SOB.  Has some dysuria which she attribute to yeast infection.   She is taking Eliquis       PAST MEDICAL & SURGICAL HISTORY:  H/O aortic valve stenosis      Congenital polycystic kidney      Obesity      Atrial fibrillation and flutter      Afib      CML (chronic myelocytic leukemia)      History of cardiac radiofrequency ablation  2020 for afib/flutter      History of gastric surgery      H/O aortic valve replacement   at [facility]          FAMILY HISTORY:      SOCIAL HISTORY:    no etoh/ tob / illicit drug    Allergies    penicillin (Unknown)    Intolerances    HOME MEDS:   Eliquis 5 mg bid  Amio 200 mg 2 tab BID (for 5 days then amio 200 mg daily thereafter)  Metoprolol  mg bid  Lasix 40 mg daily  SPironolactone  ASA  Allopurinol  Imatinib  lipitor        REVIEW OF SYSTEMS:  CONSTITUTIONAL: + fatigue. Denies fever / chills  EYES: No eye pain, visual disturbances, or discharge  ENMT:  No difficulty hearing, tinnitus, vertigo; No sinus or throat pain  NECK: No pain or stiffness  RESPIRATORY: No cough, wheezing, chills or hemoptysis; No Shortness of Breath  CARDIOVASCULAR: See hpi.   GASTROINTESTINAL: No abdominal or epigastric pain. No nausea, vomiting, or hematemesis; No diarrhea or constipation. No melena or hematochezia.  GENITOURINARY: Some dysuria.  NEUROLOGICAL: No headaches, memory loss, loss of strength, numbness, or tremors  SKIN: No itching, burning, rashes, or lesions   LYMPH Nodes: No enlarged glands  ENDOCRINE: No heat or cold intolerance; No hair loss  MUSCULOSKELETAL: No joint pain or swelling; No muscle, back, or extremity pain  PSYCHIATRIC: No depression, anxiety, mood swings, or difficulty sleeping  HEME/LYMPH: No easy bruising, or bleeding gums  ALLERY AND IMMUNOLOGIC: No hives or eczema	      PHYSICAL EXAM:  Vital Signs Last 24 Hrs  T(C): 36.9 (2024 09:07), Max: 36.9 (2024 09:07)  T(F): 98.5 (2024 09:07), Max: 98.5 (2024 09:07)  HR: 52 (2024 12:43) (48 - 54)  BP: 100/55 (2024 12:43) (93/54 - 159/73)  BP(mean): 77 (2024 11:29) (77 - 77)  RR: 16 (2024 12:43) (16 - 18)  SpO2: 97% (2024 12:43) (97% - 99%)    Parameters below as of 2024 12:43  Patient On (Oxygen Delivery Method): room air    Constitutional: NAD but appears very tired	  Neck: No JVD  CVS: Normal S1 / S2, , No murmurs  Pulm: CTA. No wheeze or rale  GI:  + BS, soft, NT / ND   Ext: No LE edema  Neurologic: A&O x 3, Non-focal  Psych: Pleasant, has good insight  Skin: No rash or lesion       	  LABS:	                         8.8    8.40  )-----------( 304      ( 2024 08:25 )             29.1     01-08    134<L>  |  100  |  41<H>  ----------------------------<  180<H>  4.0   |  19<L>  |  1.81<H>    Ca    8.8      2024 08:25    EK24 (outpatient) AFIB 128 bpm.   EK24 (ER): Junctional rhythm 52 bpm.   EMS rhythm strips and EKGs: junctional / sinus  with HR ~39 bpm on initial rhythm strip.     Telemetry in ER:  Sinus  currently HR 47-50 bpm at rest.     < from: TTE Echo Complete w/o Contrast w/ Doppler (24 @ 10:18) >   1. Technically difficult study.   2. Abnormal septal motion seen due to previous cardiac surgery. Left   ventricular endocardium is not well visualized. Grossly, left ventricular   function appears normal.   3. Mildly dilated right ventricular size with mildly reduced right   ventricular systolic function.   4. Biatrial enlargement.   5. A bioprosthetic valve noted in the aortic position. Leaflets appear   thickened.The peak transvalvular velocity is 3.30 m/s, the mean   transvalvular gradient is 26.00 mmHg, and the LVOT/AV velocity ratio is   0.19.   6. No pericardial effusion.   7. Left pleural effusion.   8. Compared to the previous ADALGISA performed on 2021, the peak and   mean gradients across the aortic valve were previously reported to be 25   mm Hg and 13 mm Hg, respectively.   9. Leaflets appear thickened.         Electrophysiology Consult Note:     CHIEF COMPLAINT:  Patient is a 58y old  Female who presents with a chief complaint of weakness       HISTORY OF PRESENT ILLNESS:   68 y/o F with morbid obesity, CML (in remission), bioprosthetic AVR, CMP with LVEF 40%, and AFIB / flutter.  She had AFlutter ablation at [facility] in 2020, and PVI with atypical aflutter ablation at [facility] on  with Dr. Nolen.  She recently was admitted to [facility] for 10 days in December for urosepsis.  Had AFIB at that time in setting of urosepsis.  She followed up with Dr. Nava last 24 when she was still in AFIB.  She was prescribed Amio 400 mg BID load for 5 days and was supposed to decrease on Metoprolol ER from 100 mg BID to 50 mg BID.  However she didn't decrease her Metoprolol.  She states called EMS today because she felt very weak over the weekend.    Per EMS, her HR was in the 30-40s bpm with hypotension (70mmgh systolic). Was given IVF.  Lactate here 4.9 with repeat 4.3 after fluid.    EPS is called to see her for  and questionable AFIB in the ER.    Currently still weak but not as bad as earlier. No chest pain. No syncope or SOB.  Has some dysuria which she attribute to yeast infection.   She is taking Eliquis.   Didn't take her cardiac meds today.       PAST MEDICAL & SURGICAL HISTORY:  H/O aortic valve stenosis      Congenital polycystic kidney      Obesity      Atrial fibrillation and flutter      Afib      CML (chronic myelocytic leukemia)      History of cardiac radiofrequency ablation  2020 for afib/flutter      History of gastric surgery      H/O aortic valve replacement   at [facility]          FAMILY HISTORY:      SOCIAL HISTORY:    no etoh/ tob / illicit drug    Allergies    penicillin (Unknown)    Intolerances    HOME MEDS:   Eliquis 5 mg bid  Amio 200 mg 2 tab BID (for 5 days then amio 200 mg daily thereafter)  Metoprolol  mg bid  Lasix 40 mg daily  SPironolactone  ASA  Allopurinol  Imatinib  lipitor        REVIEW OF SYSTEMS:  CONSTITUTIONAL: + fatigue. Denies fever / chills  EYES: No eye pain, visual disturbances, or discharge  ENMT:  No difficulty hearing, tinnitus, vertigo; No sinus or throat pain  NECK: No pain or stiffness  RESPIRATORY: No cough, wheezing, chills or hemoptysis; No Shortness of Breath  CARDIOVASCULAR: See hpi.   GASTROINTESTINAL: No abdominal or epigastric pain. No nausea, vomiting, or hematemesis; No diarrhea or constipation. No melena or hematochezia.  GENITOURINARY: Some dysuria.  NEUROLOGICAL: No headaches, memory loss, loss of strength, numbness, or tremors  SKIN: No itching, burning, rashes, or lesions   LYMPH Nodes: No enlarged glands  ENDOCRINE: No heat or cold intolerance; No hair loss  MUSCULOSKELETAL: No joint pain or swelling; No muscle, back, or extremity pain  PSYCHIATRIC: No depression, anxiety, mood swings, or difficulty sleeping  HEME/LYMPH: No easy bruising, or bleeding gums  ALLERY AND IMMUNOLOGIC: No hives or eczema	      PHYSICAL EXAM:  Vital Signs Last 24 Hrs  T(C): 36.9 (2024 09:07), Max: 36.9 (2024 09:07)  T(F): 98.5 (2024 09:07), Max: 98.5 (2024 09:07)  HR: 52 (2024 12:43) (48 - 54)  BP: 100/55 (2024 12:43) (93/54 - 159/73)  BP(mean): 77 (2024 11:29) (77 - 77)  RR: 16 (2024 12:43) (16 - 18)  SpO2: 97% (2024 12:43) (97% - 99%)    Parameters below as of 2024 12:43  Patient On (Oxygen Delivery Method): room air    Constitutional: NAD but appears very tired	  Neck: No JVD  CVS: Normal S1 / S2, , No murmurs  Pulm: CTA. No wheeze or rale  GI:  + BS, soft, NT / ND   Ext: No LE edema  Neurologic: A&O x 3, Non-focal  Psych: Pleasant, has good insight  Skin: No rash or lesion       	  LABS:	                         8.8    8.40  )-----------( 304      ( 2024 08:25 )             29.1     01-08    134<L>  |  100  |  41<H>  ----------------------------<  180<H>  4.0   |  19<L>  |  1.81<H>    Ca    8.8      2024 08:25    EK24 (outpatient) AFIB 128 bpm.   EK24 (ER): Junctional rhythm 52 bpm.   EMS rhythm strips and EKGs: junctional / sinus  with HR ~39 bpm on initial rhythm strip.     Telemetry in ER:  Sinus  currently HR 47-50 bpm at rest.     < from: TTE Echo Complete w/o Contrast w/ Doppler (24 @ 10:18) >   1. Technically difficult study.   2. Abnormal septal motion seen due to previous cardiac surgery. Left   ventricular endocardium is not well visualized. Grossly, left ventricular   function appears normal.   3. Mildly dilated right ventricular size with mildly reduced right   ventricular systolic function.   4. Biatrial enlargement.   5. A bioprosthetic valve noted in the aortic position. Leaflets appear   thickened.The peak transvalvular velocity is 3.30 m/s, the mean   transvalvular gradient is 26.00 mmHg, and the LVOT/AV velocity ratio is   0.19.   6. No pericardial effusion.   7. Left pleural effusion.   8. Compared to the previous ADALGISA performed on 2021, the peak and   mean gradients across the aortic valve were previously reported to be 25   mm Hg and 13 mm Hg, respectively.   9. Leaflets appear thickened.         Electrophysiology Consult Note:     CHIEF COMPLAINT:  Patient is a 58y old  Female who presents with a chief complaint of weakness       HISTORY OF PRESENT ILLNESS:   66 y/o F with morbid obesity, CML (in remission), bioprosthetic AVR, CMP with LVEF 40%, and AFIB / flutter.  She had AFlutter ablation at [facility] in 2020, and PVI with atypical aflutter ablation at [facility] on  with Dr. Nolen.  She recently was admitted to [facility] for 10 days in December for urosepsis.  Had AFIB at that time in setting of urosepsis.  She followed up with Dr. Nava last 24 when she was still in AFIB.  She was prescribed Amio 400 mg BID load for 5 days and was supposed to decrease on Metoprolol ER from 100 mg BID to 50 mg BID.  However she didn't decrease her Metoprolol.  She states called EMS today because she felt very weak over the weekend.    Per EMS, her HR was in the 30-40s bpm with hypotension (70mmgh systolic). Was given IVF.  Lactate here 4.9 with repeat 4.3 after fluid.    EPS is called to see her for  and questionable AFIB in the ER.    Currently still weak but not as bad as earlier. No chest pain. No syncope or SOB.  Has some dysuria which she attribute to yeast infection.   She is taking Eliquis.   Didn't take her cardiac meds today.       PAST MEDICAL & SURGICAL HISTORY:  H/O aortic valve stenosis      Congenital polycystic kidney      Obesity      Atrial fibrillation and flutter      Afib      CML (chronic myelocytic leukemia)      History of cardiac radiofrequency ablation  2020 for afib/flutter      History of gastric surgery      H/O aortic valve replacement   at [facility]          FAMILY HISTORY:      SOCIAL HISTORY:    no etoh/ tob / illicit drug    Allergies    penicillin (Unknown)    Intolerances    HOME MEDS:   Eliquis 5 mg bid  Amio 200 mg 2 tab BID (for 5 days then amio 200 mg daily thereafter)  Metoprolol  mg bid  Lasix 40 mg daily  SPironolactone  ASA  Allopurinol  Imatinib  lipitor        REVIEW OF SYSTEMS:  CONSTITUTIONAL: + fatigue. Denies fever / chills  EYES: No eye pain, visual disturbances, or discharge  ENMT:  No difficulty hearing, tinnitus, vertigo; No sinus or throat pain  NECK: No pain or stiffness  RESPIRATORY: No cough, wheezing, chills or hemoptysis; No Shortness of Breath  CARDIOVASCULAR: See hpi.   GASTROINTESTINAL: No abdominal or epigastric pain. No nausea, vomiting, or hematemesis; No diarrhea or constipation. No melena or hematochezia.  GENITOURINARY: Some dysuria.  NEUROLOGICAL: No headaches, memory loss, loss of strength, numbness, or tremors  SKIN: No itching, burning, rashes, or lesions   LYMPH Nodes: No enlarged glands  ENDOCRINE: No heat or cold intolerance; No hair loss  MUSCULOSKELETAL: No joint pain or swelling; No muscle, back, or extremity pain  PSYCHIATRIC: No depression, anxiety, mood swings, or difficulty sleeping  HEME/LYMPH: No easy bruising, or bleeding gums  ALLERY AND IMMUNOLOGIC: No hives or eczema	      PHYSICAL EXAM:  Vital Signs Last 24 Hrs  T(C): 36.9 (2024 09:07), Max: 36.9 (2024 09:07)  T(F): 98.5 (2024 09:07), Max: 98.5 (2024 09:07)  HR: 52 (2024 12:43) (48 - 54)  BP: 100/55 (2024 12:43) (93/54 - 159/73)  BP(mean): 77 (2024 11:29) (77 - 77)  RR: 16 (2024 12:43) (16 - 18)  SpO2: 97% (2024 12:43) (97% - 99%)    Parameters below as of 2024 12:43  Patient On (Oxygen Delivery Method): room air    Constitutional: NAD but appears very tired	  Neck: No JVD  CVS: Normal S1 / S2, , No murmurs  Pulm: CTA. No wheeze or rale  GI:  + BS, soft, NT / ND   Ext: No LE edema  Neurologic: A&O x 3, Non-focal  Psych: Pleasant, has good insight  Skin: No rash or lesion       	  LABS:	                         8.8    8.40  )-----------( 304      ( 2024 08:25 )             29.1     01-08    134<L>  |  100  |  41<H>  ----------------------------<  180<H>  4.0   |  19<L>  |  1.81<H>    Ca    8.8      2024 08:25    EK24 (outpatient) AFIB 128 bpm.   EK24 (ER): Junctional rhythm 52 bpm.   EMS rhythm strips and EKGs: junctional / sinus  with HR ~39 bpm on initial rhythm strip.     Telemetry in ER:  Sinus  currently HR 47-50 bpm at rest.     < from: TTE Echo Complete w/o Contrast w/ Doppler (24 @ 10:18) >   1. Technically difficult study.   2. Abnormal septal motion seen due to previous cardiac surgery. Left   ventricular endocardium is not well visualized. Grossly, left ventricular   function appears normal.   3. Mildly dilated right ventricular size with mildly reduced right   ventricular systolic function.   4. Biatrial enlargement.   5. A bioprosthetic valve noted in the aortic position. Leaflets appear   thickened.The peak transvalvular velocity is 3.30 m/s, the mean   transvalvular gradient is 26.00 mmHg, and the LVOT/AV velocity ratio is   0.19.   6. No pericardial effusion.   7. Left pleural effusion.   8. Compared to the previous ADALGISA performed on 2021, the peak and   mean gradients across the aortic valve were previously reported to be 25   mm Hg and 13 mm Hg, respectively.   9. Leaflets appear thickened.

## 2024-01-08 NOTE — ED PROVIDER NOTE - NSICDXPASTSURGICALHX_GEN_ALL_CORE_FT
PAST SURGICAL HISTORY:  H/O aortic valve replacement 2012 at [facility]    History of cardiac radiofrequency ablation 2/2020 for afib/flutter    History of gastric surgery      PAST SURGICAL HISTORY:  H/O aortic valve replacement 2012 at [facility]    History of cardiac radiofrequency ablation 2/2020 for afib/flutter    History of gastric surgery

## 2024-01-08 NOTE — ED ADULT NURSE NOTE - OBJECTIVE STATEMENT
PT bibems for hypotension and bradycardia. Upgraded, MD Director at bedside. Pt noted to be drowsy but aaox3. PT reports generalized weakness for 3 days, takes metoprolol (per  at bedside may have been taking too much medication). Pt given atropine and 500 ml bolus on bus. Reported lowest heart rate 40. ekg obtained. pt noted to be sinus . pt placed on pads, on ccm. PT bibems for hypotension and bradycardia. Upgraded, MD Director at bedside. Pt noted to be drowsy but aaox3. PT reports generalized weakness for 3 days, takes metoprolol (per  at bedside may have been taking too much medication). Pt given atropine and 500 ml bolus on bus. Reported lowest heart rate 40. ekg obtained. pt noted to be sinus . pt placed on pads, on ccm. Pt reports hx of recent admit and treatment of urosepsis. Pt on cardiac pads. PT bibems for hypotension and bradycardia. Pt poor historian. Upgraded, MD Director at bedside. Pt noted to be drowsy but aaox3. PT reports generalized weakness for 3 days, takes metoprolol (per  at bedside may have been taking too much medication). Pt given atropine and 500 ml bolus on bus. Reported lowest heart rate 40. ekg obtained. pt noted to be sinus . pt placed on pads, on ccm. Pt reports hx of recent admit and treatment of urosepsis. Pt on cardiac pads. PT bibems for hypotension and bradycardia. Pt poor historian. Upgraded, MD Director at bedside. Pt noted to be drowsy but aaox3. PT reports generalized weakness for 3 days, takes metoprolol (per  at bedside may have been taking too much medication). Pt given atropine and 500 ml bolus on bus. Reported lowest heart rate 40. ekg obtained. pt noted to be sinus . pt placed on pads, on ccm. Pt reports hx of recent admit and treatment of urosepsis. Pt on cardiac pads. Noted skin breakdown under breast.

## 2024-01-08 NOTE — ED PROVIDER NOTE - PROGRESS NOTE DETAILS
Patient with elevated lactate of 4.9.?  If this is due to infection (patient denies localizing symptoms) or hypoperfusion from her cardiac issues earlier this morning with her bradycardia and hypotension.  Labs also notable for elevated creatinine of 1.8.  Unclear if this is due to possible dehydration, patient's recent Lasix and spironolactone,  issue.  Patient pending urinalysis.  Patient given empiric antibiotics for possible UTI since she had a recent UTI/urosepsis and had elevated lactate.  Patient also ordered for IV fluids.  Chest x-ray notable for cardiomegaly which is similar to 2021 but echo ordered to evaluate for any kind of tamponade or effusion issue.  Patient's heart rate and blood pressure have been stable and actually with improved blood pressure after fluids.  Patient discussed with Dr. Nava.  Initial Trop 26, repeat 23; he was not concerned about ACS.  Patient continues with sinus bradycardia on the monitor.  EP consulted and will evaluate.  At this time it is unclear if patient's weakness and hypotension are related to possible infection or cardiac in nature.  Continue to await urine, Pt seen by and discussed w Dr Nolen and discussed w Dr Nava - cleared from card for floor admit w rec to decrease her amio and metoprolol.  + uti.  Will admit to med w card following as consult.

## 2024-01-08 NOTE — CONSULT NOTE ADULT - NS ATTEND AMEND GEN_ALL_CORE FT
I saw and evaluated the patient. I agree with the findings and the plan of care as documented in the PHYSICIAN ASSISTANT’s note.  We will resume AMIO and reduce the dose of Toprol in the next day or so.  She will be admitted for treatment of her UTI

## 2024-01-08 NOTE — H&P ADULT - PROBLEM SELECTOR PLAN 1
-P/w HR was in the 30-40s bpm with hypotension (70mmgh systolic) per EMS. S/P fluids and Atropine by EMS  -H/o bioprosthetic AVR, CMP with LVEF 40%, and AFIB / flutter (s/p AFlutter ablation at [facility] in 2/2020, and PVI with atypical aflutter ablation at [facility] on 2021 with Dr. Nolen).    -Recently admitted to [facility] for 10 days in December for urosepsis and rapid AFIB. Was prescribed Amio 400 mg BID load for 5 days on 1/4/24 and was supposed to decrease on Metoprolol ER from 100 mg BID to 50 mg BID by her outpatient Cardiologist.  However she didn't decrease her Metoprolol.   -EP was consulted in ED. EKG reviewed.  Currently HR in 50's. States feeling better and denies chest pain, sib, palpations. Likely 2/2 from Amio load and too much Metoprolol.     Plan:   -Hold off Amio and Bb today. Continue workup for possible UTI  -Daily EKGS. -P/w HR was in the 30-40s bpm with hypotension (70mmgh systolic) per EMS. S/P fluids and Atropine by EMS  -H/o bioprosthetic AVR, CMP with LVEF 40%, and AFIB / flutter (s/p AFlutter ablation at [facility] in 2/2020, and PVI with atypical aflutter ablation at [facility] on 2021 with Dr. Nolen).    -Recently admitted to [facility] for 10 days in December for urosepsis and rapid AFIB. Was prescribed Amio 400 mg BID load for 5 days on 1/4/24 and was supposed to decrease on Metoprolol ER from 100 mg BID to 50 mg BID by her outpatient Cardiologist.  However she didn't decrease her Metoprolol.   -EP was consulted in ED. EKG reviewed.  Currently HR in 50's. States feeling better and denies chest pain, sib, palpations. Likely 2/2 from Amio load and too much Metoprolol.     Plan:   -Hold off Amio and Bb today. Continue workup for possible UTI  -Daily EKGS. -P/w HR was in the 30-40s bpm with hypotension (70mmgh systolic) per EMS. S/P fluids and Atropine by EMS  -H/o bioprosthetic AVR, CMP with LVEF 40%, and AFIB / flutter (s/p AFlutter ablation at [facility] in 2/2020, and PVI with atypical aflutter ablation at [facility] on 2021 with Dr. Nolen).    -Recently admitted to [facility] for 10 days in December for urosepsis and rapid AFIB. Was prescribed Amio 400 mg BID load for 5 days on 1/4/24 and was supposed to decrease on Metoprolol ER from 100 mg BID to 50 mg BID by her outpatient Cardiologist.  However she didn't decrease her Metoprolol.   -EP was consulted in ED. EKG reviewed.  Currently HR in 50's with /56. States feeling better and denies chest pain, sib, palpations. Likely 2/2 from Amio load and too much Metoprolol.     Plan:   -Hold off Amio and Bb today. Continue workup for possible UTI  -Daily EKGS. -P/w HR was in the 30-40s bpm with hypotension (70mmgh systolic) per EMS. S/P fluids and Atropine by EMS  -H/o bioprosthetic AVR, CMP with LVEF 40%, and AFIB / flutter (s/p AFlutter ablation at [facility] in 2/2020, and PVI with atypical aflutter ablation at [facility] on 2021 with Dr. Nolen).    -Recently admitted to [facility] for 10 days in December for urosepsis and rapid AFIB. Was prescribed Amio 400 mg BID load for 5 days on 1/4/24 and was supposed to decrease on Metoprolol ER from 100 mg BID to 50 mg BID by her outpatient Cardiologist.  However she didn't decrease her Metoprolol.   -EP was consulted in ED. EKG reviewed.  Currently HR in 50's with /56. States feeling better and denies chest pain, sib, palpations. Likely 2/2 from Amio load and too much Metoprolol.     Plan:   -Hold off Amio and Bb today. Continue workup for possible UTI  -Daily EKGS.

## 2024-01-08 NOTE — H&P ADULT - NSHPLABSRESULTS_GEN_ALL_CORE
8.8    8.40  )-----------( 304      ( 2024 08:25 )             29.1           134<L>  |  100  |  41<H>  ----------------------------<  180<H>  4.0   |  19<L>  |  1.81<H>    Ca    8.8      2024 08:25                Urinalysis Basic - ( 2024 08:35 )    Color: Dark Yellow / Appearance: Cloudy / S.024 / pH: x  Gluc: x / Ketone: Trace mg/dL  / Bili: Small / Urobili: 1.0 mg/dL   Blood: x / Protein: 300 mg/dL / Nitrite: Negative   Leuk Esterase: Moderate / RBC: 10 /HPF /  /HPF   Sq Epi: x / Non Sq Epi: 4 /HPF / Bacteria: Many /HPF            Lactate Trend   @ 09:32 Lactate:4.3    @ 08:51 Lactate:4.9             CAPILLARY BLOOD GLUCOSE

## 2024-01-08 NOTE — PATIENT PROFILE ADULT - FALL HARM RISK - HARM RISK INTERVENTIONS
Communicate Risk of Fall with Harm to all staff/Reinforce activity limits and safety measures with patient and family/Tailored Fall Risk Interventions/Visual Cue: Yellow wristband and red socks/Bed in lowest position, wheels locked, appropriate side rails in place/Call bell, personal items and telephone in reach/Instruct patient to call for assistance before getting out of bed or chair/Non-slip footwear when patient is out of bed/[place] to call system/Physically safe environment - no spills, clutter or unnecessary equipment/Purposeful Proactive Rounding/Room/bathroom lighting operational, light cord in reach Communicate Risk of Fall with Harm to all staff/Reinforce activity limits and safety measures with patient and family/Tailored Fall Risk Interventions/Visual Cue: Yellow wristband and red socks/Bed in lowest position, wheels locked, appropriate side rails in place/Call bell, personal items and telephone in reach/Instruct patient to call for assistance before getting out of bed or chair/Non-slip footwear when patient is out of bed/[place] to call system/Physically safe environment - no spills, clutter or unnecessary equipment/Purposeful Proactive Rounding/Room/bathroom lighting operational, light cord in reach

## 2024-01-08 NOTE — H&P ADULT - PROBLEM SELECTOR PLAN 4
H/o og A-fib status post ablation, aortic stenosis status post aortic valve replacement, heart failure (echo 10/23 with a EF 40%, AV VR in place, no effusion per her cardiologist Dr Nava)  Home meds: Aspirin 81 mg, Atorvastatin 40 mg qd, furosemide 40 mg qd, metoprolol 100 BID, Spironolactone 25 mg qd, Amioderone 400 BID    Plan:   -Hold BB and Amio for now  -Cont. Aspirin, Atorvastatin, furosemide 40 mg qd, [place] 25 H/o og A-fib status post ablation, aortic stenosis status post aortic valve replacement, heart failure (echo 10/23 with a EF 40%, AV VR in place, no effusion per her cardiologist Dr Nava)  Home meds: Aspirin 81 mg, Atorvastatin 40 mg qd, furosemide 40 mg qd, metoprolol 100 BID, Spironolactone 25 mg qd, Amioderone 400 BID    Plan:   -Hold BB and Amio for now  -Cont. Aspirin, Atorvastatin, furosemide 40 mg qd, [place] 25 Patient with a longstanding history of atrial fibrillation refractory to ablation, bioprosthetic AVR, CMP with LVEF 40%, and AFIB / flutter.  She had AFlutter ablation at [facility] in 2/2020, and PVI with atypical aflutter ablation at [facility] on 2021 with Dr. Nolen with recent admission to [facility] for urosepsis and rapid AFib.   Home meds: Eliquis 5 mg BID, Amiodarone 400 BID load since 1/4/24, Metoprolol 100 BID    Plan:  -Hold BB and Amiodarone in the setting of bradycardia   -Cont. Eliquis 5 BID   -Daily EKGS Patient with a longstanding history of atrial fibrillation refractory to ablation, bioprosthetic AVR, CMP with LVEF 40%, and AFIB / flutter.  She had AFlutter ablation at [facility] in 2/2020, and PVI with atypical aflutter ablation at [facility] on 2021 with Dr. Nolen with recent admission to [facility] for urosepsis and rapid AFib.   Home meds: Eliquis 5 mg BID, Amiodarone 400 BID load since 1/4/24, Metoprolol 100 BID    Plan:  -Hold BB and Amiodarone in the setting of bradycardia   -Cont. Eliquis 5 BID   -Daily EKGS

## 2024-01-08 NOTE — ED PROVIDER NOTE - OBJECTIVE STATEMENT
58-year-old female history of A-fib status post ablation, aortic stenosis status post aortic valve replacement, polycystic kidney disease, status post gastric bypass, high cholesterol, heart failure (echo 10/23 with a EF 40%, AV VR in place, no effusion per her cardiologist Dr Nava) status post recent admission at another hospital for urosepsis and rapid A-fib.  EMS brought patient in for bradycardia and hypotension.  Dr. Nava and EMS reported that patient has been taking metoprolol, amiodarone, Lasix, spironolactone.  Patient had been increased from metoprolol 50 to 100 mg as well as Lasix 40 daily to twice daily during her hospitalization.  When she saw Dr. Nava on January 4, he told her that he was starting her on amiodarone and to decrease her metoprolol.  Patient did start the amiodarone but did not decrease her metoprolol.  EMS states they found the patient with a slow A-fib, 39-40 and hypotension 70s.  They started her on 750 cc of fluid and gave her atropine with improvement in her blood pressure and heart rate.  Patient complains of generalized weakness for the past few days.  Patient denies headache, URI symptoms, cough, chest pain, shortness of breath, abdominal pain, vomiting, diarrhea, dysuria.  Patient notes some vaginal itching that she feels is yeast.

## 2024-01-08 NOTE — H&P ADULT - PROBLEM SELECTOR PLAN 2
Patient recently admitted (12/17/23-12/27/23) to [facility] for Urosepsis and rapid afib.  Still c/o of mild dysuria today. UA + cloudy, leuk esterase, wbc  S/P CTX x1 in ED (11/8)     Plan:  F/U Urine Cx and Bx Cx  Cont with CTX 1g qd Patient recently admitted (12/17/23-12/27/23) to [facility] for Urosepsis and rapid afib.  Still c/o of mild dysuria today. Denies fever, chills, abd pain, other urinary complaints.   UA + cloudy, leuk esterase, wbc. Lactate 4.9 -> 4.3   S/P CTX x1 in ED (11/8)     Plan:  -F/U Urine Cx and Bx Cx  -Cont with CTX 2g qd  -Trend lactate

## 2024-01-08 NOTE — H&P ADULT - PROBLEM SELECTOR PLAN 6
F: s/p 1l NS x3  E: Replete as needed  N: DASH/TLC  D: Heparin sq  Dispo: RMF -Continue with home imatinib 300 mg PO Qd and allopurinol 100 mg qd

## 2024-01-08 NOTE — ED ADULT TRIAGE NOTE - CHIEF COMPLAINT QUOTE
Pt brought in by EMS for weakness x3 days with bradycardia and hypotension today. On arrival, pt awake and alert but lethargic. Denies chest pain, shortness of breath, fevers. On Amiodarone and Metoprolol for hx of atrial flutter, reports she took normal doses.

## 2024-01-08 NOTE — H&P ADULT - ASSESSMENT
Patient is a 58-year-old female history of A-fib status post ablation, CML on imatinib, aortic stenosis status post aortic valve replacement, polycystic kidney disease, status post gastric bypass, high cholesterol, heart failure (echo 10/23 with a EF 40%, AV VR in place, no effusion per her cardiologist Dr Nava) status post recent admission (12/17/23-12/27/23) at [facility] for urosepsis and rapid A-fib, presenting with generalized weakness, found to be bradycardic and hypotensive per EMS. Admitted for further management of bradycardia and UTI.  Patient is a 58-year-old female history of A-fib status post ablation, CML on imatinib, aortic stenosis status post aortic valve replacement, polycystic kidney disease, status post gastric bypass, high cholesterol, heart failure (echo 10/23 with a EF 40%, AV VR in place, no effusion per her cardiologist Dr Nava) status post recent admission (12/17/23-12/27/23) at [facility] for urosepsis and rapid A-fib, presenting with generalized weakness, found to be bradycardic and hypotensive per EMS. Admitted for further management of bradycardia and UTI.

## 2024-01-08 NOTE — H&P ADULT - HISTORY OF PRESENT ILLNESS
Patient is a 58-year-old female history of A-fib status post ablation, aortic stenosis status post aortic valve replacement, polycystic kidney disease, status post gastric bypass, high cholesterol, heart failure (echo 10/23 with a EF 40%, AV VR in place, no effusion per her cardiologist Dr Nava) status post recent admission (12/17/23-12/27/23) at [facility] for urosepsis and rapid A-fib, presenting with generalized weakness and found to be bradycardic and hypotensive per EMS. Dr. Nava and EMS reported that patient has been taking metoprolol, amiodarone, Lasix, spironolactone.  Patient had been increased from metoprolol 50 to 100 mg as well as Lasix 40 daily to twice daily during her hospitalization.  When she saw Dr. Nava on January 4, he told her that he was starting her on amiodarone 800 qd and to decrease her metoprolol.  Patient did start the amiodarone but continued to take metoprolol 100 BID.  EMS states they found the patient with a slow A-fib, 39-40 and hypotension to 70s.  They started her on 750 cc of fluid and gave her atropine with improvement in her blood pressure and heart rate.  Patient complains of mild dysuria and generalized weakness for the past few days since her hospitalization  Patient denies headache, URI symptoms, cough, chest pain, shortness of breath, abdominal pain, vomiting, diarrhea, dysuria.  Patient notes some vaginal itching that she feels is yeast.    In the ED:  Initial vital signs: T: 98.5 F, HR: 54, BP: 106/56, R: 16, SpO2: 99 % on RA  ED course:   Labs: significant for Hgb 8.8. .7, lactate 4.9 -> 4.3, Bun 41, Cr 1.81, UA + cloudy, moderate leuk esterase, wbc, fine granular cast  Imaging:  CXR: Monitoring device Left basilar focal atelectasis. Stable cardiomegaly, status post median sternotomy, aortic valve replacement. Unremarkable mediastinum. Scoliosis.  EKG: Sinus , 52 bpm. QTc 552  Medications:   CTX x1, 1L NS bolus x3, Reglan x1, zofran x1  Patient is a 58-year-old female history of A-fib status post ablation, aortic stenosis status post aortic valve replacement, polycystic kidney disease, status post gastric bypass, high cholesterol, heart failure (echo 10/23 with a EF 40%, AV VR in place, no effusion per her cardiologist Dr Nava) status post recent admission (12/17/23-12/27/23) at [facility] for urosepsis and rapid A-fib, presenting with generalized weakness and found to be bradycardic and hypotensive per EMS. Dr. Nava and EMS reported that patient has been taking metoprolol, amiodarone, Lasix, spironolactone.  Patient had been increased from metoprolol 50 to 100 mg as well as Lasix 40 daily to twice daily during her hospitalization.  When she saw Dr. Nava on January 4, he told her that he was starting her on amiodarone 800 qd and to decrease her metoprolol.  Patient did start the amiodarone but continued to take metoprolol 100 BID.  EMS states they found the patient with a slow A-fib, 39-40 and hypotension to 70s.  They started her on 750 cc of fluid and gave her atropine with improvement in her blood pressure and heart rate.  Patient complains of mild dysuria and generalized weakness for the past few days since her hospitalization  Patient denies headache, URI symptoms, cough, chest pain, shortness of breath, abdominal pain, vomiting, diarrhea, dysuria.  Patient notes some vaginal itching that she feels is yeast.    In the ED:  Initial vital signs: T: 98.5 F, HR: 54, BP: 106/56, R: 16, SpO2: 99 % on RA  ED course:   Labs: significant for Hgb 8.8. .7, lactate 4.9 -> 4.3, Bun 41, Cr 1.81, UA + cloudy, moderate leuk esterase, wbc, fine granular cast  Imaging:  CXR: Monitoring device Left basilar focal atelectasis. Stable cardiomegaly, status post median sternotomy, aortic valve replacement. Unremarkable mediastinum. Scoliosis.  EKG: Sinus , 52 bpm. QTc 552  Medications:   CTX x1, 1L NS bolus x3, Reglan x1, zofran x1  Patient is a 58-year-old female history of A-fib status post ablation, CML on imatinib, aortic stenosis status post aortic valve replacement, polycystic kidney disease, status post gastric bypass, high cholesterol, heart failure (echo 10/23 with a EF 40%, AV VR in place, no effusion per her cardiologist Dr Nava) status post recent admission (12/17/23-12/27/23) at [facility] for urosepsis and rapid A-fib, presenting with generalized weakness and found to be bradycardic and hypotensive per EMS. Dr. Nava and EMS reported that patient has been taking metoprolol, amiodarone, Lasix, spironolactone.  Patient had been increased from metoprolol 50 to 100 mg as well as Lasix 40 daily to twice daily during her hospitalization.  When she saw Dr. Nava on January 4, he told her that he was starting her on amiodarone 800 qd and to decrease her metoprolol.  Patient did start the amiodarone but continued to take metoprolol 100 BID.  EMS states they found the patient with a slow A-fib, 39-40 and hypotension to 70s.  They started her on 750 cc of fluid and gave her atropine with improvement in her blood pressure and heart rate.  Patient complains of mild dysuria and generalized weakness for the past few days since her hospitalization  Patient denies headache, URI symptoms, cough, chest pain, shortness of breath, abdominal pain, vomiting, diarrhea, dysuria.  Patient notes some vaginal itching that she feels is yeast.    In the ED:  Initial vital signs: T: 98.5 F, HR: 54, BP: 106/56, R: 16, SpO2: 99 % on RA  ED course:   Labs: significant for Hgb 8.8. .7, lactate 4.9 -> 4.3, Bun 41, Cr 1.81, UA + cloudy, moderate leuk esterase, wbc, fine granular cast  Imaging:  CXR: Monitoring device Left basilar focal atelectasis. Stable cardiomegaly, status post median sternotomy, aortic valve replacement. Unremarkable mediastinum. Scoliosis.  EKG: Sinus , 52 bpm. QTc 552  Medications:   CTX x1, 1L NS bolus x3, Reglan x1, zofran x1  Patient is a 58-year-old female history of A-fib status post ablation, CML on imatinib, aortic stenosis status post aortic valve replacement, polycystic kidney disease, status post gastric bypass, high cholesterol, heart failure (echo 10/23 with a EF 40%, AV VR in place, no effusion per her cardiologist Dr Nava) status post recent admission (12/17/23-12/27/23) at [facility] for urosepsis and rapid A-fib, presenting with generalized weakness and found to be bradycardic and hypotensive per EMS. Dr. Nava and EMS reported that patient has been taking metoprolol, amiodarone, Lasix, spironolactone.  Patient had been increased from metoprolol 50 to 100 mg as well as Lasix 40 daily to twice daily during her hospitalization.  When she saw Dr. Nava on January 4, he told her that he was starting her on amiodarone 800 qd and to decrease her metoprolol.  Patient did start the amiodarone but continued to take metoprolol 100 BID.  EMS states they found the patient with a slow A-fib, 39-40 and hypotension to 70s.  They started her on 750 cc of fluid and gave her atropine with improvement in her blood pressure and heart rate.  Patient complains of mild dysuria and generalized weakness for the past few days since her hospitalization  Patient denies headache, URI symptoms, cough, chest pain, shortness of breath, abdominal pain, vomiting, diarrhea, dysuria.  Patient notes some vaginal itching that she feels is yeast.    In the ED:  Initial vital signs: T: 98.5 F, HR: 54, BP: 106/56, R: 16, SpO2: 99 % on RA  ED course:   Labs: significant for Hgb 8.8. .7, lactate 4.9 -> 4.3, Bun 41, Cr 1.81, UA + cloudy, moderate leuk esterase, wbc, fine granular cast  Imaging:  CXR: Monitoring device Left basilar focal atelectasis. Stable cardiomegaly, status post median sternotomy, aortic valve replacement. Unremarkable mediastinum. Scoliosis.  EKG: Sinus , 52 bpm. QTc 552  Medications:   CTX x1, 1L NS bolus x3, Reglan x1, zofran x1  Patient is a 58-year-old female history of A-fib status post ablation, CML on imatinib, aortic stenosis status post aortic valve replacement, polycystic kidney disease, status post gastric bypass, high cholesterol, heart failure (echo 10/23 with a EF 40%, AV VR in place, no effusion per her cardiologist Dr Nava) status post recent admission (12/17/23-12/27/23) at [facility] for urosepsis and rapid A-fib, presenting with generalized weakness and found to be bradycardic and hypotensive per EMS. Dr. Nava and EMS reported that patient has been taking metoprolol, amiodarone, Lasix, spironolactone.  Patient had been increased from metoprolol 50 to 100 mg as well as Lasix 40 daily to twice daily during her hospitalization.  When she saw Dr. Nava on January 4, he told her that he was starting her on amiodarone 800 qd and to decrease her metoprolol.  Patient did start the amiodarone but continued to take metoprolol 100 BID.  EMS states they found the patient with a slow A-fib, 39-40 and hypotension to 70s.  They started her on 750 cc of fluid and gave her atropine with improvement in her blood pressure and heart rate.  Patient complains of mild dysuria and generalized weakness for the past few days since her hospitalization  Patient denies headache, URI symptoms, cough, chest pain, shortness of breath, abdominal pain, vomiting, diarrhea, dysuria.  Patient notes some vaginal itching that she feels is yeast.    In the ED:  Initial vital signs: T: 98.5 F, HR: 54, BP: 106/56, R: 16, SpO2: 99 % on RA  ED course:   Labs: significant for Hgb 8.8. .7, lactate 4.9 -> 4.3, Bun 41, Cr 1.81, UA + cloudy, moderate leuk esterase, wbc, fine granular cast  Imaging:  CXR: Monitoring device Left basilar focal atelectasis. Stable cardiomegaly, status post median sternotomy, aortic valve replacement. Unremarkable mediastinum. Scoliosis.  EKG: Sinus , 52 bpm. QTc 552  Medications:   CTX x1, 1L NS bolus x3, Reglan x1, Zofran x1  Patient is a 58-year-old female history of A-fib status post ablation, CML on imatinib, aortic stenosis status post aortic valve replacement, polycystic kidney disease, status post gastric bypass, high cholesterol, heart failure (echo 10/23 with a EF 40%, AV VR in place, no effusion per her cardiologist Dr Nava) status post recent admission (12/17/23-12/27/23) at [facility] for urosepsis and rapid A-fib, presenting with generalized weakness and found to be bradycardic and hypotensive per EMS. Dr. Nava and EMS reported that patient has been taking metoprolol, amiodarone, Lasix, spironolactone.  Patient had been increased from metoprolol 50 to 100 mg as well as Lasix 40 daily to twice daily during her hospitalization.  When she saw Dr. Nava on January 4, he told her that he was starting her on amiodarone 800 qd and to decrease her metoprolol.  Patient did start the amiodarone but continued to take metoprolol 100 BID.  EMS states they found the patient with a slow A-fib, 39-40 and hypotension to 70s.  They started her on 750 cc of fluid and gave her atropine with improvement in her blood pressure and heart rate.  Patient complains of mild dysuria and generalized weakness for the past few days since her hospitalization  Patient denies headache, URI symptoms, cough, chest pain, shortness of breath, abdominal pain, vomiting, diarrhea, dysuria.  Patient notes some vaginal itching that she feels is yeast.    In the ED:  Initial vital signs: T: 98.5 F, HR: 54, BP: 106/56, R: 16, SpO2: 99 % on RA  ED course:   Labs: significant for Hgb 8.8. .7, lactate 4.9 -> 4.3, Bun 41, Cr 1.81, UA + cloudy, moderate leuk esterase, wbc, fine granular cast  Imaging:  CXR: Monitoring device Left basilar focal atelectasis. Stable cardiomegaly, status post median sternotomy, aortic valve replacement. Unremarkable mediastinum. Scoliosis.  EKG: Sinus , 52 bpm. QTc 552  Medications:   CTX x1, 1L NS bolus x3, Reglan x1, Zofran x1

## 2024-01-09 ENCOUNTER — TRANSCRIPTION ENCOUNTER (OUTPATIENT)
Age: 59
End: 2024-01-09

## 2024-01-09 VITALS
TEMPERATURE: 98 F | DIASTOLIC BLOOD PRESSURE: 66 MMHG | RESPIRATION RATE: 18 BRPM | HEART RATE: 57 BPM | OXYGEN SATURATION: 98 % | SYSTOLIC BLOOD PRESSURE: 107 MMHG

## 2024-01-09 DIAGNOSIS — Z79.899 OTHER LONG TERM (CURRENT) DRUG THERAPY: ICD-10-CM

## 2024-01-09 DIAGNOSIS — R82.71 BACTERIURIA: ICD-10-CM

## 2024-01-09 LAB
-  STAPHYLOCOCCUS EPIDERMIDIS, METHICILLIN RESISTANT: SIGNIFICANT CHANGE UP
-  STAPHYLOCOCCUS EPIDERMIDIS, METHICILLIN RESISTANT: SIGNIFICANT CHANGE UP
ALBUMIN SERPL ELPH-MCNC: 3.4 G/DL — SIGNIFICANT CHANGE UP (ref 3.3–5)
ALBUMIN SERPL ELPH-MCNC: 3.4 G/DL — SIGNIFICANT CHANGE UP (ref 3.3–5)
ALP SERPL-CCNC: 94 U/L — SIGNIFICANT CHANGE UP (ref 40–120)
ALP SERPL-CCNC: 94 U/L — SIGNIFICANT CHANGE UP (ref 40–120)
ALT FLD-CCNC: 35 U/L — SIGNIFICANT CHANGE UP (ref 10–45)
ALT FLD-CCNC: 35 U/L — SIGNIFICANT CHANGE UP (ref 10–45)
ANION GAP SERPL CALC-SCNC: 14 MMOL/L — SIGNIFICANT CHANGE UP (ref 5–17)
ANION GAP SERPL CALC-SCNC: 14 MMOL/L — SIGNIFICANT CHANGE UP (ref 5–17)
AST SERPL-CCNC: 36 U/L — SIGNIFICANT CHANGE UP (ref 10–40)
AST SERPL-CCNC: 36 U/L — SIGNIFICANT CHANGE UP (ref 10–40)
BASOPHILS # BLD AUTO: 0.02 K/UL — SIGNIFICANT CHANGE UP (ref 0–0.2)
BASOPHILS # BLD AUTO: 0.02 K/UL — SIGNIFICANT CHANGE UP (ref 0–0.2)
BASOPHILS NFR BLD AUTO: 0.2 % — SIGNIFICANT CHANGE UP (ref 0–2)
BASOPHILS NFR BLD AUTO: 0.2 % — SIGNIFICANT CHANGE UP (ref 0–2)
BILIRUB DIRECT SERPL-MCNC: 0.3 MG/DL — SIGNIFICANT CHANGE UP (ref 0–0.3)
BILIRUB DIRECT SERPL-MCNC: 0.3 MG/DL — SIGNIFICANT CHANGE UP (ref 0–0.3)
BILIRUB INDIRECT FLD-MCNC: 0.4 MG/DL — SIGNIFICANT CHANGE UP (ref 0.2–1)
BILIRUB INDIRECT FLD-MCNC: 0.4 MG/DL — SIGNIFICANT CHANGE UP (ref 0.2–1)
BILIRUB SERPL-MCNC: 0.7 MG/DL — SIGNIFICANT CHANGE UP (ref 0.2–1.2)
BLD GP AB SCN SERPL QL: NEGATIVE — SIGNIFICANT CHANGE UP
BLD GP AB SCN SERPL QL: NEGATIVE — SIGNIFICANT CHANGE UP
BUN SERPL-MCNC: 48 MG/DL — HIGH (ref 7–23)
BUN SERPL-MCNC: 48 MG/DL — HIGH (ref 7–23)
CALCIUM SERPL-MCNC: 8.9 MG/DL — SIGNIFICANT CHANGE UP (ref 8.4–10.5)
CALCIUM SERPL-MCNC: 8.9 MG/DL — SIGNIFICANT CHANGE UP (ref 8.4–10.5)
CHLORIDE SERPL-SCNC: 101 MMOL/L — SIGNIFICANT CHANGE UP (ref 96–108)
CHLORIDE SERPL-SCNC: 101 MMOL/L — SIGNIFICANT CHANGE UP (ref 96–108)
CO2 SERPL-SCNC: 18 MMOL/L — LOW (ref 22–31)
CO2 SERPL-SCNC: 18 MMOL/L — LOW (ref 22–31)
CREAT SERPL-MCNC: 2.01 MG/DL — HIGH (ref 0.5–1.3)
CREAT SERPL-MCNC: 2.01 MG/DL — HIGH (ref 0.5–1.3)
EGFR: 28 ML/MIN/1.73M2 — LOW
EGFR: 28 ML/MIN/1.73M2 — LOW
EOSINOPHIL # BLD AUTO: 0.02 K/UL — SIGNIFICANT CHANGE UP (ref 0–0.5)
EOSINOPHIL # BLD AUTO: 0.02 K/UL — SIGNIFICANT CHANGE UP (ref 0–0.5)
EOSINOPHIL NFR BLD AUTO: 0.2 % — SIGNIFICANT CHANGE UP (ref 0–6)
EOSINOPHIL NFR BLD AUTO: 0.2 % — SIGNIFICANT CHANGE UP (ref 0–6)
GLUCOSE SERPL-MCNC: 149 MG/DL — HIGH (ref 70–99)
GLUCOSE SERPL-MCNC: 149 MG/DL — HIGH (ref 70–99)
GRAM STN FLD: ABNORMAL
GRAM STN FLD: ABNORMAL
HCT VFR BLD CALC: 26.3 % — LOW (ref 34.5–45)
HCT VFR BLD CALC: 26.3 % — LOW (ref 34.5–45)
HGB BLD-MCNC: 8 G/DL — LOW (ref 11.5–15.5)
HGB BLD-MCNC: 8 G/DL — LOW (ref 11.5–15.5)
IMM GRANULOCYTES NFR BLD AUTO: 0.5 % — SIGNIFICANT CHANGE UP (ref 0–0.9)
IMM GRANULOCYTES NFR BLD AUTO: 0.5 % — SIGNIFICANT CHANGE UP (ref 0–0.9)
LACTATE SERPL-SCNC: 3.6 MMOL/L — HIGH (ref 0.5–2)
LACTATE SERPL-SCNC: 3.6 MMOL/L — HIGH (ref 0.5–2)
LACTATE SERPL-SCNC: 3.8 MMOL/L — HIGH (ref 0.5–2)
LACTATE SERPL-SCNC: 3.8 MMOL/L — HIGH (ref 0.5–2)
LYMPHOCYTES # BLD AUTO: 1.74 K/UL — SIGNIFICANT CHANGE UP (ref 1–3.3)
LYMPHOCYTES # BLD AUTO: 1.74 K/UL — SIGNIFICANT CHANGE UP (ref 1–3.3)
LYMPHOCYTES # BLD AUTO: 19.9 % — SIGNIFICANT CHANGE UP (ref 13–44)
LYMPHOCYTES # BLD AUTO: 19.9 % — SIGNIFICANT CHANGE UP (ref 13–44)
MAGNESIUM SERPL-MCNC: 2.4 MG/DL — SIGNIFICANT CHANGE UP (ref 1.6–2.6)
MAGNESIUM SERPL-MCNC: 2.4 MG/DL — SIGNIFICANT CHANGE UP (ref 1.6–2.6)
MCHC RBC-ENTMCNC: 30.4 GM/DL — LOW (ref 32–36)
MCHC RBC-ENTMCNC: 30.4 GM/DL — LOW (ref 32–36)
MCHC RBC-ENTMCNC: 31.6 PG — SIGNIFICANT CHANGE UP (ref 27–34)
MCHC RBC-ENTMCNC: 31.6 PG — SIGNIFICANT CHANGE UP (ref 27–34)
MCV RBC AUTO: 104 FL — HIGH (ref 80–100)
MCV RBC AUTO: 104 FL — HIGH (ref 80–100)
METHOD TYPE: SIGNIFICANT CHANGE UP
METHOD TYPE: SIGNIFICANT CHANGE UP
MONOCYTES # BLD AUTO: 1.08 K/UL — HIGH (ref 0–0.9)
MONOCYTES # BLD AUTO: 1.08 K/UL — HIGH (ref 0–0.9)
MONOCYTES NFR BLD AUTO: 12.3 % — SIGNIFICANT CHANGE UP (ref 2–14)
MONOCYTES NFR BLD AUTO: 12.3 % — SIGNIFICANT CHANGE UP (ref 2–14)
NEUTROPHILS # BLD AUTO: 5.85 K/UL — SIGNIFICANT CHANGE UP (ref 1.8–7.4)
NEUTROPHILS # BLD AUTO: 5.85 K/UL — SIGNIFICANT CHANGE UP (ref 1.8–7.4)
NEUTROPHILS NFR BLD AUTO: 66.9 % — SIGNIFICANT CHANGE UP (ref 43–77)
NEUTROPHILS NFR BLD AUTO: 66.9 % — SIGNIFICANT CHANGE UP (ref 43–77)
NRBC # BLD: 0 /100 WBCS — SIGNIFICANT CHANGE UP (ref 0–0)
NRBC # BLD: 0 /100 WBCS — SIGNIFICANT CHANGE UP (ref 0–0)
PHOSPHATE SERPL-MCNC: 4.9 MG/DL — HIGH (ref 2.5–4.5)
PHOSPHATE SERPL-MCNC: 4.9 MG/DL — HIGH (ref 2.5–4.5)
PLATELET # BLD AUTO: 253 K/UL — SIGNIFICANT CHANGE UP (ref 150–400)
PLATELET # BLD AUTO: 253 K/UL — SIGNIFICANT CHANGE UP (ref 150–400)
POTASSIUM SERPL-MCNC: 5 MMOL/L — SIGNIFICANT CHANGE UP (ref 3.5–5.3)
POTASSIUM SERPL-MCNC: 5 MMOL/L — SIGNIFICANT CHANGE UP (ref 3.5–5.3)
POTASSIUM SERPL-SCNC: 5 MMOL/L — SIGNIFICANT CHANGE UP (ref 3.5–5.3)
POTASSIUM SERPL-SCNC: 5 MMOL/L — SIGNIFICANT CHANGE UP (ref 3.5–5.3)
PROT SERPL-MCNC: 6.1 G/DL — SIGNIFICANT CHANGE UP (ref 6–8.3)
PROT SERPL-MCNC: 6.1 G/DL — SIGNIFICANT CHANGE UP (ref 6–8.3)
RBC # BLD: 2.53 M/UL — LOW (ref 3.8–5.2)
RBC # BLD: 2.53 M/UL — LOW (ref 3.8–5.2)
RBC # FLD: 14.9 % — HIGH (ref 10.3–14.5)
RBC # FLD: 14.9 % — HIGH (ref 10.3–14.5)
RH IG SCN BLD-IMP: POSITIVE — SIGNIFICANT CHANGE UP
RH IG SCN BLD-IMP: POSITIVE — SIGNIFICANT CHANGE UP
SODIUM SERPL-SCNC: 133 MMOL/L — LOW (ref 135–145)
SODIUM SERPL-SCNC: 133 MMOL/L — LOW (ref 135–145)
WBC # BLD: 8.75 K/UL — SIGNIFICANT CHANGE UP (ref 3.8–10.5)
WBC # BLD: 8.75 K/UL — SIGNIFICANT CHANGE UP (ref 3.8–10.5)
WBC # FLD AUTO: 8.75 K/UL — SIGNIFICANT CHANGE UP (ref 3.8–10.5)
WBC # FLD AUTO: 8.75 K/UL — SIGNIFICANT CHANGE UP (ref 3.8–10.5)

## 2024-01-09 PROCEDURE — 86900 BLOOD TYPING SEROLOGIC ABO: CPT

## 2024-01-09 PROCEDURE — 99233 SBSQ HOSP IP/OBS HIGH 50: CPT | Mod: GC

## 2024-01-09 PROCEDURE — 87186 SC STD MICRODIL/AGAR DIL: CPT

## 2024-01-09 PROCEDURE — 99233 SBSQ HOSP IP/OBS HIGH 50: CPT

## 2024-01-09 PROCEDURE — 86850 RBC ANTIBODY SCREEN: CPT

## 2024-01-09 PROCEDURE — 84484 ASSAY OF TROPONIN QUANT: CPT

## 2024-01-09 PROCEDURE — 84100 ASSAY OF PHOSPHORUS: CPT

## 2024-01-09 PROCEDURE — 83735 ASSAY OF MAGNESIUM: CPT

## 2024-01-09 PROCEDURE — 93306 TTE W/DOPPLER COMPLETE: CPT

## 2024-01-09 PROCEDURE — 36415 COLL VENOUS BLD VENIPUNCTURE: CPT

## 2024-01-09 PROCEDURE — 96375 TX/PRO/DX INJ NEW DRUG ADDON: CPT

## 2024-01-09 PROCEDURE — 82248 BILIRUBIN DIRECT: CPT

## 2024-01-09 PROCEDURE — 87150 DNA/RNA AMPLIFIED PROBE: CPT

## 2024-01-09 PROCEDURE — 99291 CRITICAL CARE FIRST HOUR: CPT | Mod: 25

## 2024-01-09 PROCEDURE — 80053 COMPREHEN METABOLIC PANEL: CPT

## 2024-01-09 PROCEDURE — 96374 THER/PROPH/DIAG INJ IV PUSH: CPT

## 2024-01-09 PROCEDURE — 87086 URINE CULTURE/COLONY COUNT: CPT

## 2024-01-09 PROCEDURE — 87040 BLOOD CULTURE FOR BACTERIA: CPT

## 2024-01-09 PROCEDURE — 83605 ASSAY OF LACTIC ACID: CPT

## 2024-01-09 PROCEDURE — 87181 SC STD AGAR DILUTION PER AGT: CPT

## 2024-01-09 PROCEDURE — 86901 BLOOD TYPING SEROLOGIC RH(D): CPT

## 2024-01-09 PROCEDURE — 85025 COMPLETE CBC W/AUTO DIFF WBC: CPT

## 2024-01-09 PROCEDURE — 82247 BILIRUBIN TOTAL: CPT

## 2024-01-09 PROCEDURE — 81001 URINALYSIS AUTO W/SCOPE: CPT

## 2024-01-09 PROCEDURE — 71045 X-RAY EXAM CHEST 1 VIEW: CPT

## 2024-01-09 PROCEDURE — 85027 COMPLETE CBC AUTOMATED: CPT

## 2024-01-09 PROCEDURE — 80048 BASIC METABOLIC PNL TOTAL CA: CPT

## 2024-01-09 RX ORDER — APIXABAN 2.5 MG/1
1 TABLET, FILM COATED ORAL
Refills: 0 | DISCHARGE

## 2024-01-09 RX ORDER — AMIODARONE HYDROCHLORIDE 400 MG/1
1 TABLET ORAL
Qty: 30 | Refills: 0
Start: 2024-01-09 | End: 2024-02-07

## 2024-01-09 RX ORDER — METOPROLOL TARTRATE 50 MG
1 TABLET ORAL
Qty: 30 | Refills: 0
Start: 2024-01-09 | End: 2024-02-07

## 2024-01-09 RX ORDER — METOPROLOL TARTRATE 50 MG
1 TABLET ORAL
Refills: 0 | DISCHARGE

## 2024-01-09 RX ORDER — AMIODARONE HYDROCHLORIDE 400 MG/1
0 TABLET ORAL
Refills: 0 | DISCHARGE

## 2024-01-09 RX ORDER — APIXABAN 2.5 MG/1
1 TABLET, FILM COATED ORAL
Qty: 0 | Refills: 0 | DISCHARGE
Start: 2024-01-09

## 2024-01-09 RX ORDER — AMIODARONE HYDROCHLORIDE 400 MG/1
1 TABLET ORAL
Qty: 0 | Refills: 0 | DISCHARGE
Start: 2024-01-09

## 2024-01-09 RX ORDER — FUROSEMIDE 40 MG
1 TABLET ORAL
Refills: 0 | DISCHARGE

## 2024-01-09 RX ORDER — AMIODARONE HYDROCHLORIDE 400 MG/1
200 TABLET ORAL EVERY 24 HOURS
Refills: 0 | Status: DISCONTINUED | OUTPATIENT
Start: 2024-01-09 | End: 2024-01-09

## 2024-01-09 RX ORDER — METOPROLOL TARTRATE 50 MG
50 TABLET ORAL EVERY 24 HOURS
Refills: 0 | Status: DISCONTINUED | OUTPATIENT
Start: 2024-01-09 | End: 2024-01-09

## 2024-01-09 RX ORDER — SPIRONOLACTONE 25 MG/1
1 TABLET, FILM COATED ORAL
Refills: 0 | DISCHARGE

## 2024-01-09 RX ADMIN — ONDANSETRON 4 MILLIGRAM(S): 8 TABLET, FILM COATED ORAL at 05:22

## 2024-01-09 RX ADMIN — APIXABAN 5 MILLIGRAM(S): 2.5 TABLET, FILM COATED ORAL at 05:28

## 2024-01-09 NOTE — CHART NOTE - NSCHARTNOTEFT_GEN_A_CORE
Pt is 59 yo woman with A. Fib, on AC, Aortic stenosis, s/p valve replacement, recent urosepsis, admitted with bradycardia and hypotension in the setting of recent initiation of amiodarone and continuation of beta blocker therapy.   --- amiodarone and beta blockers stopped on admission with HR gradually improving. As per EP recs pt to be restarted on Amiodarone 200daily and Toprol 50 daily  ---for pyuria without symptoms of UTI, antibiotic therapy is held  Pt likely ready for discharge today

## 2024-01-09 NOTE — PROGRESS NOTE ADULT - PROBLEM SELECTOR PLAN 2
Patient recently admitted (12/17/23-12/27/23) to [facility] for Urosepsis and rapid afib.  Still c/o of mild dysuria today. Denies fever, chills, abd pain, other urinary complaints.   UA + cloudy, leuk esterase, wbc. Lactate 4.9 -> 4.3   S/P CTX x1 in ED (11/8)     Plan:  -F/U Urine Cx --> pending  - f/u Bx Cx --> NGTD  -Cont with CTX 2g qd  -Trend lactate Patient recently admitted (12/17/23-12/27/23) to [facility] for Urosepsis and rapid afib.  Still c/o of mild dysuria today. Denies fever, chills, abd pain, other urinary complaints.   UA + cloudy, leuk esterase, wbc. Lactate 4.9 -> 4.3   S/P CTX x1 in ED (11/8)     Plan:  - F/u Urine Cx --> pending  - f/u Bx Cx --> NGTD  - D/c CTX 2g qd  - D/c lactate monitoring

## 2024-01-09 NOTE — DISCHARGE NOTE PROVIDER - NSDCCPGOAL_GEN_ALL_CORE_FT
impairments found/functional limitations in following categories/predicted duration of therapy intervention/anticipated equipment needs at discharge/anticipated discharge recommendation
To get better and follow your care plan as instructed.

## 2024-01-09 NOTE — PROGRESS NOTE ADULT - ASSESSMENT
Patient is a 58-year-old female history of A-fib status post ablation, CML on imatinib, aortic stenosis status post aortic valve replacement, polycystic kidney disease, status post gastric bypass, high cholesterol, heart failure (echo 10/23 with a EF 40%, AV VR in place, no effusion per her cardiologist Dr Nava) status post recent admission (12/17/23-12/27/23) at [facility] for urosepsis and rapid A-fib, presenting with generalized weakness, found to be bradycardic and hypotensive per EMS. Admitted for further management of bradycardia and UTI.  Patient is a 58-year-old female history of A-fib status post ablation, CML on imatinib, aortic stenosis status post aortic valve replacement, polycystic kidney disease, status post gastric bypass, high cholesterol, heart failure (echo 10/23 with a EF 40%, AV VR in place, no effusion per her cardiologist Dr Nava) status post recent admission (12/17/23-12/27/23) at [facility] for urosepsis and rapid A-fib, presenting with generalized weakness, found to be bradycardic and hypotensive per EMS. Admitted for further management of bradycardia and UTI.

## 2024-01-09 NOTE — DISCHARGE NOTE PROVIDER - CARE PROVIDERS DIRECT ADDRESSES
[email or web address]

## 2024-01-09 NOTE — PROGRESS NOTE ADULT - NS ATTEND AMEND GEN_ALL_CORE FT
I saw and evaluated the patient. I agree with the findings and the plan of care as documented in the PHYSICIAN ASSISTANT’s note.

## 2024-01-09 NOTE — PROGRESS NOTE ADULT - PROBLEM SELECTOR PLAN 1
-P/w HR was in the 30-40s bpm with hypotension (70mmgh systolic) per EMS. S/P fluids and Atropine by EMS  -H/o bioprosthetic AVR, CMP with LVEF 40%, and AFIB / flutter (s/p AFlutter ablation at [facility] in 2/2020, and PVI with atypical aflutter ablation at [facility] on 2021 with Dr. Nolen).    -Recently admitted to [facility] for 10 days in December for urosepsis and rapid AFIB. Was prescribed Amio 400 mg BID load for 5 days on 1/4/24 and was supposed to decrease on Metoprolol ER from 100 mg BID to 50 mg BID by her outpatient Cardiologist.  However she didn't decrease her Metoprolol.   -EP was consulted in ED. EKG reviewed.  Currently HR in 50's with /56. States feeling better and denies chest pain, sib, palpations. Likely 2/2 from Amio load and too much Metoprolol.     Plan:   -Held amio, metoprolol, lasix & spironolactone for now  - f/u TTE --> in Afib, grossly normal LV, dilated RV w/ reduced RV function (performed 1/8)  -Daily EKGS. -P/w HR was in the 30-40s bpm with hypotension (70mmgh systolic) per EMS. S/P fluids and Atropine by EMS  -H/o bioprosthetic AVR, CMP with LVEF 40%, and AFIB / flutter (s/p AFlutter ablation at [facility] in 2/2020, and PVI with atypical aflutter ablation at [facility] on 2021 with Dr. Nolen).    -Recently admitted to [facility] for 10 days in December for urosepsis and rapid AFIB. Was prescribed Amio 400 mg BID load for 5 days on 1/4/24 and was supposed to decrease on Metoprolol ER from 100 mg BID to 50 mg BID by her outpatient Cardiologist.  However she didn't decrease her Metoprolol.   -EP was consulted in ED. EKG reviewed.  Currently HR in 50's with /56. States feeling better and denies chest pain, sib, palpations. Likely 2/2 from Amio load and too much Metoprolol.     Plan:   -Held amio, metoprolol, lasix & spironolactone for now  - f/u TTE --> in Afib, grossly normal LV, dilated RV w/ reduced RV function (performed 1/8)  -Daily EKGS. -P/w HR was in the 30-40s bpm with hypotension (70mmgh systolic) per EMS. S/P fluids and Atropine by EMS  -H/o bioprosthetic AVR, CMP with LVEF 40%, and AFIB / flutter (s/p AFlutter ablation at [facility] in 2/2020, and PVI with atypical aflutter ablation at [facility] on 2021 with Dr. Nolen).    -Recently admitted to [facility] for 10 days in December for urosepsis and rapid AFIB. Was prescribed Amio 400 mg BID load for 5 days on 1/4/24 and was supposed to decrease on Metoprolol ER from 100 mg BID to 50 mg BID by her outpatient Cardiologist.  However she didn't decrease her Metoprolol.   -EP was consulted in ED. EKG reviewed.  Currently HR in 50's with /56. States feeling better and denies chest pain, sib, palpations. Likely 2/2 from Amio load and too much Metoprolol.  EKG: NSR. HR 64. Qtc 472ms.    Plan:   - EP consulted and recommendations appreciated  - Per EP, resume toprol 50 mg qd and amiodarone 200 mg qd  - F/u PCP (Dr. Nava) within a week  - F/u EP (Dr. Nolen) on 2/13 at 1:40 PM -P/w HR was in the 30-40s bpm with hypotension (70mmgh systolic) per EMS. S/P fluids and Atropine by EMS  -H/o bioprosthetic AVR, CMP with LVEF 40%, and AFIB / flutter (s/p AFlutter ablation at [facility] in 2/2020, and PVI with atypical aflutter ablation at [facility] on 2021 with Dr. Nolen).    -Recently admitted to [facility] for 10 days in December for urosepsis and rapid AFIB. Was prescribed Amio 400 mg BID load for 5 days on 1/4/24 and was supposed to decrease on Metoprolol ER from 100 mg BID to 50 mg BID by her outpatient Cardiologist.  However she didn't decrease her Metoprolol.   -EP was consulted in ED. EKG reviewed.  Currently HR in 50's with /56. States feeling better and denies chest pain, sib, palpations. Likely 2/2 from Amio load and too much Metoprolol.  EKG: NSR. HR 64. Qtc 472ms.    Plan:   - EP consulted and recommendations appreciated  - Per EP, resume toprol 50 mg qd and amiodarone 200 mg qd  - F/u PCP (Dr. Nava) within a week  - F/u EP (Dr. Nolen) on 2/13 at 1:40 PM

## 2024-01-09 NOTE — DISCHARGE NOTE NURSING/CASE MANAGEMENT/SOCIAL WORK - NSDCPEFALRISK_GEN_ALL_CORE
For information on Fall & Injury Prevention, visit: [email or web address] OR  [email or web address] OR  https://www.cdc.gov/steadi/patient.html For information on Fall & Injury Prevention, visit: [email or web address] OR  [email or web address] OR  https://www.cdc.gov/steadi/patient.html

## 2024-01-09 NOTE — PROGRESS NOTE ADULT - PROBLEM SELECTOR PLAN 3
Pt with Cr 1.81 (baseline .9-1)  Likely 2/2 to hypotension  vs uti    Plan:  -f/u urine lytes  -Cont to trend  -Avoid nephrotoxic meds Pt with Cr 1.81 (baseline .9-1)  Likely 2/2 to hypotension vs uti  Cr 2.01    Plan:  -f/u urine lytes  -Cont to trend  -Avoid nephrotoxic meds

## 2024-01-09 NOTE — DISCHARGE NOTE PROVIDER - HOSPITAL COURSE
#Discharge: do not delete    Patient is 58F w/ PMH of A-fib status post ablation, CML on imatinib, aortic stenosis status post aortic valve replacement, polycystic kidney disease, status post gastric bypass, high cholesterol, heart failure (echo 10/23 with a EF 40%, AV VR in place, no effusion per her cardiologist Dr Nava) status post recent admission (12/17/23-12/27/23) at [facility] for urosepsis and rapid A-fib, presenting with generalized weakness, found to be bradycardic and hypotensive per EMS. Admitted for further management of bradycardia and UTI.    Hospital course (by problem):    #Bradycardia.   -P/w HR was in the 30-40s bpm with hypotension (70mmgh systolic) per EMS. S/P fluids and Atropine by EMS  -H/o bioprosthetic AVR, CMP with LVEF 40%, and AFIB / flutter (s/p AFlutter ablation at [facility] in 2/2020, and PVI with atypical aflutter ablation at [facility] on 2021 with Dr. Nolen).    -Recently admitted to [facility] for 10 days in December for urosepsis and rapid AFIB. Was prescribed Amio 400 mg BID load for 5 days on 1/4/24 and was supposed to decrease on Metoprolol ER from 100 mg BID to 50 mg BID by her outpatient Cardiologist.  However she didn't decrease her Metoprolol.   -EP was consulted in ED. EKG reviewed.  Currently HR in 50's with /56. States feeling better and denies chest pain, sib, palpations. Likely 2/2 from Amio load and too much Metoprolol.     - restarting Amio   - restarting metoprolol  - f/u TTE --> in Afib, grossly normal LV, dilated RV w/ reduced RV function (performed 1/8)  -Daily EKGS.      #Urinary tract infection.   Patient recently admitted (12/17/23-12/27/23) to [facility] for Urosepsis and rapid afib.  Still c/o of mild dysuria today. Denies fever, chills, abd pain, other urinary complaints.   UA + cloudy, leuk esterase, wbc. Lactate 4.9 -> 4.3   S/P CTX x1 in ED (11/8)     -F/U Urine Cx --> pending  - f/u Bx Cx --> NGTD  -Cont with CTX 2g qd  -Trend lactate.      #RHODA (acute kidney injury).   Pt with Cr 1.81 (baseline .9-1)  Likely 2/2 to hypotension  vs uti    -f/u urine lytes  -Cont to trend  -Avoid nephrotoxic meds.    #Afib.   Patient with a longstanding history of atrial fibrillation refractory to ablation, bioprosthetic AVR, CMP with LVEF 40%, and AFIB / flutter.  She had AFlutter ablation at [facility] in 2/2020, and PVI with atypical aflutter ablation at [facility] on 2021 with Dr. Nolen with recent admission to [facility] for urosepsis and rapid AFib.   Home meds: Eliquis 5 mg BID, Amiodarone 400 BID load since 1/4/24, Metoprolol 100 BID    -Holding BB and Amiodarone as hypotension and bradycardia likely iatrogenic  -Cont. Eliquis 5 BID   -Daily EKGS.      #H/O aortic valve stenosis.   H/o of A-fib status post ablation, aortic stenosis status post aortic valve replacement, heart failure (echo 10/23 with a EF 40%, AV VR in place, no effusion per her cardiologist Dr Nava)  Home meds: Aspirin 81 mg, Atorvastatin 40 mg qd, furosemide 40 mg qd, metoprolol 100 BID, Spironolactone 25 mg qd, Amioderone 400 BID    -Hold BB, Amio and diuretics for now i/s/o of hypotension and RHODA  -Cont. Aspirin, Atorvastatin.      #CML (chronic myelocytic leukemia).    -Continue with home imatinib 300 mg PO Qd and allopurinol 100 mg qd.      Patient was discharged to: (home/[facility]/acute rehab/hospice, etc, and with what services – home health PT/RN? Home O2?)    New medications:   Changes to old medications:  Medications that were stopped:    Items to follow up as outpatient:    Physical exam at the time of discharge:       #Discharge: do not delete    Patient is 58F w/ PMH of A-fib status post ablation, CML on imatinib, aortic stenosis status post aortic valve replacement, polycystic kidney disease, status post gastric bypass, high cholesterol, heart failure (echo 10/23 with a EF 40%, AV VR in place, no effusion per her cardiologist Dr Nava) status post recent admission (12/17/23-12/27/23) at [facility] for urosepsis and rapid A-fib, presenting with generalized weakness, found to be bradycardic and hypotensive per EMS. Admitted for further management of bradycardia and UTI.    Hospital course (by problem):    #Bradycardia.   -P/w HR was in the 30-40s bpm with hypotension (70mmgh systolic) per EMS. S/P fluids and Atropine by EMS  -H/o bioprosthetic AVR, CMP with LVEF 40%, and AFIB / flutter (s/p AFlutter ablation at [facility] in 2/2020, and PVI with atypical aflutter ablation at [facility] on 2021 with Dr. Nolen).    -Recently admitted to [facility] for 10 days in December for urosepsis and rapid AFIB. Was prescribed Amio 400 mg BID load for 5 days on 1/4/24 and was supposed to decrease on Metoprolol ER from 100 mg BID to 50 mg BID by her outpatient Cardiologist.  However she didn't decrease her Metoprolol.   -EP was consulted in ED. EKG reviewed.  Currently HR in 50's with /56. States feeling better and denies chest pain, sib, palpations. Likely 2/2 from Amio load and too much Metoprolol.     - restarting Amio   - restarting metoprolol  - f/u TTE --> in Afib, grossly normal LV, dilated RV w/ reduced RV function (performed 1/8)  -Daily EKGS.      #Urinary tract infection.   Patient recently admitted (12/17/23-12/27/23) to [facility] for Urosepsis and rapid afib.  Still c/o of mild dysuria today. Denies fever, chills, abd pain, other urinary complaints.   UA + cloudy, leuk esterase, wbc. Lactate 4.9 -> 4.3   S/P CTX x1 in ED (11/8)     -F/U Urine Cx --> pending  - f/u Bx Cx --> NGTD  -Cont with CTX 2g qd  -Trend lactate.      #RHODA (acute kidney injury).   Pt with Cr 1.81 (baseline .9-1)  Likely 2/2 to hypotension  vs uti    -f/u urine lytes  -Cont to trend  -Avoid nephrotoxic meds.    #Afib.   Patient with a longstanding history of atrial fibrillation refractory to ablation, bioprosthetic AVR, CMP with LVEF 40%, and AFIB / flutter.  She had AFlutter ablation at [facility] in 2/2020, and PVI with atypical aflutter ablation at [facility] on 2021 with Dr. Nolen with recent admission to [facility] for urosepsis and rapid AFib.   Home meds: Eliquis 5 mg BID, Amiodarone 400 BID load since 1/4/24, Metoprolol 100 BID    -Holding BB and Amiodarone as hypotension and bradycardia likely iatrogenic  -Cont. Eliquis 5 BID   -Daily EKGS.      #H/O aortic valve stenosis.   H/o of A-fib status post ablation, aortic stenosis status post aortic valve replacement, heart failure (echo 10/23 with a EF 40%, AV VR in place, no effusion per her cardiologist Dr Nava)  Home meds: Aspirin 81 mg, Atorvastatin 40 mg qd, furosemide 40 mg qd, metoprolol 100 BID, Spironolactone 25 mg qd, Amioderone 400 BID    -Hold BB, Amio and diuretics for now i/s/o of hypotension and RHODA  -Cont. Aspirin, Atorvastatin.      #CML (chronic myelocytic leukemia).    -Continue with home imatinib 300 mg PO Qd and allopurinol 100 mg qd.      Patient was discharged to: (home/[facility]/acute rehab/hospice, etc, and with what services – home health PT/RN? Home O2?)    New medications:   Changes to old medications:  Medications that were stopped:    Items to follow up as outpatient:    Physical exam at the time of discharge:       #Discharge: do not delete    Patient is 58F w/ PMH of A-fib status post ablation, CML on imatinib, aortic stenosis status post aortic valve replacement, polycystic kidney disease, status post gastric bypass, high cholesterol, heart failure (echo 10/23 with a EF 40%, AV VR in place, no effusion per her cardiologist Dr Nava) status post recent admission (12/17/23-12/27/23) at [facility] for urosepsis and rapid A-fib, presenting with generalized weakness, found to be bradycardic and hypotensive per EMS. Admitted for further management of bradycardia and UTI.    Hospital course (by problem):    #Bradycardia.   -P/w HR was in the 30-40s bpm with hypotension (70mmgh systolic) per EMS. S/P fluids and Atropine by EMS  -H/o bioprosthetic AVR, CMP with LVEF 40%, and AFIB / flutter (s/p AFlutter ablation at [facility] in 2/2020, and PVI with atypical aflutter ablation at [facility] on 2021 with Dr. Nolen).    -Recently admitted to [facility] for 10 days in December for urosepsis and rapid AFIB. Was prescribed Amio 400 mg BID load for 5 days on 1/4/24 and was supposed to decrease on Metoprolol ER from 100 mg BID to 50 mg BID by her outpatient Cardiologist.  However she didn't decrease her Metoprolol.   -EP was consulted in ED. EKG reviewed.  Currently HR in 50's with /56. States feeling better and denies chest pain, sib, palpations. Likely 2/2 from Amio load and too much Metoprolol.     - should restart Amiodarone 200mg qd tomorrow (1/10)  - should restart Toprol 50mg qd tomorrow (1/10)  - f/u TTE --> in Afib, grossly normal LV, dilated RV w/ reduced RV function (performed 1/8)  -Daily EKGS.      #Urinary tract infection.   Patient recently admitted (12/17/23-12/27/23) to [facility] for Urosepsis and rapid afib.  Still c/o of mild dysuria today. Denies fever, chills, abd pain, other urinary complaints.   UA + cloudy, leuk esterase, wbc. Lactate 4.9 -> 4.3   S/P CTX x1 in ED (11/8)     -F/U Urine Cx --> pending  - f/u Bx Cx --> NGTD  -Cont with CTX 2g qd  -Trend lactate.      #RHODA (acute kidney injury).   Pt with Cr 1.81 (baseline .9-1)  Likely 2/2 to hypotension  vs uti    -f/u urine lytes  -Cont to trend  -Avoid nephrotoxic meds.    #Afib.   Patient with a longstanding history of atrial fibrillation refractory to ablation, bioprosthetic AVR, CMP with LVEF 40%, and AFIB / flutter.  She had AFlutter ablation at [facility] in 2/2020, and PVI with atypical aflutter ablation at [facility] on 2021 with Dr. Nolen with recent admission to [facility] for urosepsis and rapid AFib.   Home meds: Eliquis 5 mg BID, Amiodarone 400 BID load since 1/4/24, Metoprolol 100 BID    -Holding BB and Amiodarone as hypotension and bradycardia likely iatrogenic  -Cont. Eliquis 5 BID   -Daily EKGS.      #H/O aortic valve stenosis.   H/o of A-fib status post ablation, aortic stenosis status post aortic valve replacement, heart failure (echo 10/23 with a EF 40%, AV VR in place, no effusion per her cardiologist Dr Nava)  Home meds: Aspirin 81 mg, Atorvastatin 40 mg qd, furosemide 40 mg qd, metoprolol 100 BID, Spironolactone 25 mg qd, Amioderone 400 BID    -Hold BB, Amio and diuretics for now i/s/o of hypotension and RHODA  -Cont. Aspirin, Atorvastatin.      #CML (chronic myelocytic leukemia).    -Continue with home imatinib 300 mg PO Qd and allopurinol 100 mg qd.      Patient was discharged to: (home/[facility]/acute rehab/hospice, etc, and with what services – home health PT/RN? Home O2?)    New medications:   Changes to old medications:  Medications that were stopped:    Items to follow up as outpatient:    Physical exam at the time of discharge:       #Discharge: do not delete    Patient is 58F w/ PMH of A-fib status post ablation, CML on imatinib, aortic stenosis status post aortic valve replacement, polycystic kidney disease, status post gastric bypass, high cholesterol, heart failure (echo 10/23 with a EF 40%, AV VR in place, no effusion per her cardiologist Dr Nava) status post recent admission (12/17/23-12/27/23) at [facility] for urosepsis and rapid A-fib, presenting with generalized weakness, found to be bradycardic and hypotensive per EMS. Admitted for further management of bradycardia and UTI.    Hospital course (by problem):    #Bradycardia.   -P/w HR was in the 30-40s bpm with hypotension (70mmgh systolic) per EMS. S/P fluids and Atropine by EMS  -H/o bioprosthetic AVR, CMP with LVEF 40%, and AFIB / flutter (s/p AFlutter ablation at [facility] in 2/2020, and PVI with atypical aflutter ablation at [facility] on 2021 with Dr. Nolen).    -Recently admitted to [facility] for 10 days in December for urosepsis and rapid AFIB. Was prescribed Amio 400 mg BID load for 5 days on 1/4/24 and was supposed to decrease on Metoprolol ER from 100 mg BID to 50 mg BID by her outpatient Cardiologist.  However she didn't decrease her Metoprolol.   -EP was consulted in ED. EKG reviewed.  Currently HR in 50's with /56. States feeling better and denies chest pain, sib, palpations. Likely 2/2 from Amio load and too much Metoprolol.     - should restart Amiodarone 200mg qd tomorrow (1/10)  - should restart Toprol 50mg qd tomorrow (1/10)  - f/u TTE --> in Afib, grossly normal LV, dilated RV w/ reduced RV function (performed 1/8)  -Daily EKGS.      #Urinary tract infection.   Patient recently admitted (12/17/23-12/27/23) to [facility] for Urosepsis and rapid afib.  Still c/o of mild dysuria today. Denies fever, chills, abd pain, other urinary complaints.   UA + cloudy, leuk esterase, wbc. Lactate 4.9 -> 4.3   S/P CTX x1 in ED (11/8)     -F/U Urine Cx --> pending  - f/u Bx Cx --> NGTD  -Cont with CTX 2g qd  -Trend lactate.      #RHODA (acute kidney injury).   Pt with Cr 1.81 (baseline .9-1)  Likely 2/2 to hypotension  vs uti    -f/u urine lytes  -Cont to trend  -Avoid nephrotoxic meds.    #Afib.   Patient with a longstanding history of atrial fibrillation refractory to ablation, bioprosthetic AVR, CMP with LVEF 40%, and AFIB / flutter.  She had AFlutter ablation at [facility] in 2/2020, and PVI with atypical aflutter ablation at [facility] on 2021 with Dr. Nolen with recent admission to [facility] for urosepsis and rapid AFib.   Home meds: Eliquis 5 mg BID, Amiodarone 400 BID load since 1/4/24, Metoprolol 100 BID    -Holding BB and Amiodarone as hypotension and bradycardia likely iatrogenic  -Cont. Eliquis 5 BID   -Daily EKGS.      #H/O aortic valve stenosis.   H/o of A-fib status post ablation, aortic stenosis status post aortic valve replacement, heart failure (echo 10/23 with a EF 40%, AV VR in place, no effusion per her cardiologist Dr Nava)  Home meds: Aspirin 81 mg, Atorvastatin 40 mg qd, furosemide 40 mg qd, metoprolol 100 BID, Spironolactone 25 mg qd, Amioderone 400 BID    -Hold BB, Amio and diuretics for now i/s/o of hypotension and RHODA  -Cont. Aspirin, Atorvastatin.      #CML (chronic myelocytic leukemia).    -Continue with home imatinib 300 mg PO Qd and allopurinol 100 mg qd.      Patient was discharged to: (home/[facility]/acute rehab/hospice, etc, and with what services – home health PT/RN? Home O2?)    New medications:   Changes to old medications:  Medications that were stopped:    Items to follow up as outpatient:    Physical exam at the time of discharge:       #Discharge: do not delete    Patient is 58F w/ PMH of A-fib status post ablation, CML on imatinib, aortic stenosis status post aortic valve replacement, polycystic kidney disease, status post gastric bypass, high cholesterol, heart failure (echo 10/23 with a EF 40%, AV VR in place, no effusion per her cardiologist Dr Nava) status post recent admission (12/17/23-12/27/23) at [facility] for urosepsis and rapid A-fib, presenting with generalized weakness, found to be bradycardic and hypotensive per EMS. Admitted for further management of bradycardia and UTI.    Hospital course (by problem):    #Bradycardia.   -P/w HR was in the 30-40s bpm with hypotension (70mmgh systolic) per EMS. S/P fluids and Atropine by EMS  -H/o bioprosthetic AVR, CMP with LVEF 40%, and AFIB / flutter (s/p AFlutter ablation at [facility] in 2/2020, and PVI with atypical aflutter ablation at [facility] on 2021 with Dr. Nolen).    -Recently admitted to [facility] for 10 days in December for urosepsis and rapid AFIB. Was prescribed Amio 400 mg BID load for 5 days on 1/4/24 and was supposed to decrease on Metoprolol ER from 100 mg BID to 50 mg BID by her outpatient Cardiologist.  However she didn't decrease her Metoprolol.   -EP was consulted in ED. EKG reviewed.  Currently HR in 50's with /56. States feeling better and denies chest pain, sib, palpations. Likely 2/2 from Amio load and too much Metoprolol.   - should restart Amiodarone 200mg qd tomorrow (1/10)  - should restart Metoprolol Succinate 50mg qd tomorrow (1/10)  - f/u TTE --> in Afib, grossly normal LV, dilated RV w/ reduced RV function (performed 1/8)      #Urinary tract infection.   Patient recently admitted (12/17/23-12/27/23) to [facility] for Urosepsis and rapid afib.  Still c/o of mild dysuria today. Denies fever, chills, abd pain, other urinary complaints.   UA + cloudy, leuk esterase, wbc. Lactate 4.9 -> 4.3   S/P CTX x1 in ED (11/8)   - patient denied urinary symptoms and CTX was not continued      #RHODA (acute kidney injury).   Pt with Cr 1.81 (baseline .9-1)  Likely 2/2 to hypotension  vs uti      #Afib.   Patient with a longstanding history of atrial fibrillation refractory to ablation, bioprosthetic AVR, CMP with LVEF 40%, and AFIB / flutter.  She had AFlutter ablation at [facility] in 2/2020, and PVI with atypical aflutter ablation at [facility] on 2021 with Dr. Nolen with recent admission to [facility] for urosepsis and rapid AFib.   Previous home meds: Eliquis 5 mg BID, Amiodarone 400 BID load since 1/4/24, Metoprolol 100 BID  -  should restart Amiodarone 200mg qd tomorrow (1/10)  - should restart Metoprolol Succinate 50mg qd tomorrow (1/10)      #H/O aortic valve stenosis.   H/o of A-fib status post ablation, aortic stenosis status post aortic valve replacement, heart failure (echo 10/23 with a EF 40%, AV VR in place, no effusion per her cardiologist Dr Nava)  Home meds: Aspirin 81 mg, Atorvastatin 40 mg qd, furosemide 40 mg qd, metoprolol 100 BID, Spironolactone 25 mg qd, Amioderone 400 BID  -Cont. Aspirin, Atorvastatin  - f/u with Cardiology to restart spironolactone, furosemide.  - start metoprolol succ 50 qd and amiodarone 200mg qd tomorrow (1/10)    #CML (chronic myelocytic leukemia).   -Continue with home imatinib 300 mg PO Qd and allopurinol 100 mg qd.      Patient was discharged to: home    New medications: Amiodarone 200mg qd, Metoprolol Succinate 50mg qd    Items to follow up as outpatient: f/u Cardiology      Physical exam at the time of discharge:  Constitutional: WDWN, resting comfortably in bed; NAD  Head: NC/AT  Eyes: PERRL, EOMI, anicteric sclera  ENT: no nasal discharge; uvula midline, no oropharyngeal erythema or exudates; MMM  Neck: supple; no JVD or thyromegaly  Respiratory: CTA B/L; no W/R/R, no retractions  Cardiac: +S1/S2; RRR; +sytolic murmur   Gastrointestinal: abdomen soft, NT/ND; +palpable hernia in LLQ  Extremities: WWP, no clubbing or cyanosis; +1 pitting edema   Musculoskeletal: NROM x4; no joint swelling, tenderness or erythema  Neurologic: AAOx3; no focal deficits     #Discharge: do not delete    Patient is 58F w/ PMH of A-fib status post ablation, CML on imatinib, aortic stenosis status post aortic valve replacement, polycystic kidney disease, status post gastric bypass, high cholesterol, heart failure (echo 10/23 with a EF 40%, AV VR in place, no effusion per her cardiologist Dr Nava) status post recent admission (12/17/23-12/27/23) at [facility] for urosepsis and rapid A-fib, presenting with generalized weakness, found to be bradycardic and hypotensive per EMS. Admitted for further management of bradycardia and UTI.    Hospital course (by problem):    #Bradycardia.   -P/w HR was in the 30-40s bpm with hypotension (70mmgh systolic) per EMS. S/P fluids and Atropine by EMS  -H/o bioprosthetic AVR, CMP with LVEF 40%, and AFIB / flutter (s/p AFlutter ablation at [facility] in 2/2020, and PVI with atypical aflutter ablation at [facility] on 2021 with Dr. Nolen).    -Recently admitted to [facility] for 10 days in December for urosepsis and rapid AFIB. Was prescribed Amio 400 mg BID load for 5 days on 1/4/24 and was supposed to decrease on Metoprolol ER from 100 mg BID to 50 mg BID by her outpatient Cardiologist.  However she didn't decrease her Metoprolol.   -EP was consulted in ED. EKG reviewed.  Currently HR in 50's with /56. States feeling better and denies chest pain, sib, palpations. Likely 2/2 from Amio load and too much Metoprolol.   - should restart Amiodarone 200mg qd tomorrow (1/10)  - should restart Metoprolol Succinate 50mg qd tomorrow (1/10)  - f/u TTE --> in Afib, grossly normal LV, dilated RV w/ reduced RV function (performed 1/8)      #Urinary tract infection.   Patient recently admitted (12/17/23-12/27/23) to [facility] for Urosepsis and rapid afib.  Still c/o of mild dysuria today. Denies fever, chills, abd pain, other urinary complaints.   UA + cloudy, leuk esterase, wbc. Lactate 4.9 -> 4.3   S/P CTX x1 in ED (11/8)   - patient denied urinary symptoms and CTX was not continued      #RHODA (acute kidney injury).   Pt with Cr 1.81 (baseline .9-1)  Likely 2/2 to hypotension  vs uti      #Afib.   Patient with a longstanding history of atrial fibrillation refractory to ablation, bioprosthetic AVR, CMP with LVEF 40%, and AFIB / flutter.  She had AFlutter ablation at [facility] in 2/2020, and PVI with atypical aflutter ablation at [facility] on 2021 with Dr. Nolen with recent admission to [facility] for urosepsis and rapid AFib.   Previous home meds: Eliquis 5 mg BID, Amiodarone 400 BID load since 1/4/24, Metoprolol 100 BID  -  should restart Amiodarone 200mg qd tomorrow (1/10)  - should restart Metoprolol Succinate 50mg qd tomorrow (1/10)      #H/O aortic valve stenosis.   H/o of A-fib status post ablation, aortic stenosis status post aortic valve replacement, heart failure (echo 10/23 with a EF 40%, AV VR in place, no effusion per her cardiologist Dr Nava)  Home meds: Aspirin 81 mg, Atorvastatin 40 mg qd, furosemide 40 mg qd, metoprolol 100 BID, Spironolactone 25 mg qd, Amioderone 400 BID  -Cont. Aspirin, Atorvastatin  - f/u with Cardiology to restart spironolactone, furosemide.  - start metoprolol succ 50 qd and amiodarone 200mg qd tomorrow (1/10)    #CML (chronic myelocytic leukemia).   -Continue with home imatinib 300 mg PO Qd and allopurinol 100 mg qd.      Patient was discharged to: home    New medications: Amiodarone 200mg qd, Metoprolol Succinate 50mg qd    Items to follow up as outpatient: f/u Cardiology      Physical exam at the time of discharge:  Constitutional: WDWN, resting comfortably in bed; NAD  Head: NC/AT  Eyes: PERRL, EOMI, anicteric sclera  ENT: no nasal discharge; uvula midline, no oropharyngeal erythema or exudates; MMM  Neck: supple; no JVD or thyromegaly  Respiratory: CTA B/L; no W/R/R, no retractions  Cardiac: +S1/S2; RRR; +sytolic murmur   Gastrointestinal: abdomen soft, NT/ND; +palpable hernia in LLQ  Extremities: WWP, no clubbing or cyanosis; +1 pitting edema   Musculoskeletal: NROM x4; no joint swelling, tenderness or erythema  Neurologic: AAOx3; no focal deficits     #Discharge: do not delete    Patient is 58F w/ PMH of A-fib status post ablation, CML on imatinib, aortic stenosis status post aortic valve replacement, polycystic kidney disease, status post gastric bypass, high cholesterol, heart failure (echo 10/23 with a EF 40%, AV VR in place, no effusion per her cardiologist Dr Nava) status post recent admission (12/17/23-12/27/23) at [facility] for urosepsis and rapid A-fib, presenting with generalized weakness, found to be bradycardic and hypotensive per EMS. Admitted for further management of bradycardia and UTI.    Hospital course (by problem):    #Bradycardia.   -P/w HR was in the 30-40s bpm with hypotension (70mmgh systolic) per EMS. S/P fluids and Atropine by EMS  -H/o bioprosthetic AVR, CMP with LVEF 40%, and AFIB / flutter (s/p AFlutter ablation at [facility] in 2/2020, and PVI with atypical aflutter ablation at [facility] on 2021 with Dr. Nolen).    -Recently admitted to [facility] for 10 days in December for urosepsis and rapid AFIB. Was prescribed Amio 400 mg BID load for 5 days on 1/4/24 and was supposed to decrease on Metoprolol ER from 100 mg BID to 50 mg BID by her outpatient Cardiologist.  However she didn't decrease her Metoprolol.   -EP was consulted in ED. EKG reviewed.  Currently HR in 50's with /56. States feeling better and denies chest pain, sib, palpations. Likely 2/2 from Amio load and too much Metoprolol.   - should restart Amiodarone 200mg qd tomorrow (1/10)  - should restart Metoprolol Succinate 50mg qd tomorrow (1/10)  - f/u TTE --> in Afib, grossly normal LV, dilated RV w/ reduced RV function (performed 1/8)      #Urinary tract infection.   Patient recently admitted (12/17/23-12/27/23) to [facility] for Urosepsis and rapid afib.  Still c/o of mild dysuria today. Denies fever, chills, abd pain, other urinary complaints.   UA + cloudy, leuk esterase, wbc. Lactate 4.9 -> 4.3   S/P CTX x1 in ED (11/8)   - patient denied urinary symptoms and CTX was not continued      #RHODA (acute kidney injury).   Pt with Cr 1.81 (baseline .9-1)  Likely 2/2 to hypotension vs uti      #Afib.   Patient with a longstanding history of atrial fibrillation refractory to ablation, bioprosthetic AVR, CMP with LVEF 40%, and AFIB / flutter.  She had AFlutter ablation at [facility] in 2/2020, and PVI with atypical aflutter ablation at [facility] on 2021 with Dr. Nolen with recent admission to [facility] for urosepsis and rapid AFib.   Previous home meds: Eliquis 5 mg BID, Amiodarone 400 BID load since 1/4/24, Metoprolol 100 BID  -  should restart Amiodarone 200mg qd tomorrow (1/10)  - should restart Metoprolol Succinate 50mg qd tomorrow (1/10)      #H/O aortic valve stenosis.   H/o of A-fib status post ablation, aortic stenosis status post aortic valve replacement, heart failure (echo 10/23 with a EF 40%, AV VR in place, no effusion per her cardiologist Dr Nava)  Home meds: Aspirin 81 mg, Atorvastatin 40 mg qd, furosemide 40 mg qd, metoprolol 100 BID, Spironolactone 25 mg qd, Amioderone 400 BID  -Cont. Aspirin, Atorvastatin  - f/u with Cardiology to restart spironolactone, furosemide.  - start metoprolol succ 50 qd and amiodarone 200mg qd tomorrow (1/10)        Patient was discharged to: home    New medications: Amiodarone 200mg qd, Metoprolol Succinate 50mg qd    Items to follow up as outpatient: f/u Cardiology      Physical exam at the time of discharge:  Constitutional: WDWN, resting comfortably in bed; NAD  Head: NC/AT  Eyes: PERRL, EOMI, anicteric sclera  ENT: no nasal discharge; uvula midline, no oropharyngeal erythema or exudates; MMM  Neck: supple; no JVD or thyromegaly  Respiratory: CTA B/L; no W/R/R, no retractions  Cardiac: +S1/S2; RRR; +sytolic murmur   Gastrointestinal: abdomen soft, NT/ND; +palpable hernia in LLQ  Extremities: WWP, no clubbing or cyanosis; +1 pitting edema   Musculoskeletal: NROM x4; no joint swelling, tenderness or erythema  Neurologic: AAOx3; no focal deficits     #Discharge: do not delete    Patient is 58F w/ PMH of A-fib status post ablation, CML on imatinib, aortic stenosis status post aortic valve replacement, polycystic kidney disease, status post gastric bypass, high cholesterol, heart failure (echo 10/23 with a EF 40%, AV VR in place, no effusion per her cardiologist Dr Nava) status post recent admission (12/17/23-12/27/23) at [facility] for urosepsis and rapid A-fib, presenting with generalized weakness, found to be bradycardic and hypotensive per EMS. Admitted for further management of bradycardia and UTI.    Hospital course (by problem):    #Bradycardia.   -P/w HR was in the 30-40s bpm with hypotension (70mmgh systolic) per EMS. S/P fluids and Atropine by EMS  -H/o bioprosthetic AVR, CMP with LVEF 40%, and AFIB / flutter (s/p AFlutter ablation at [facility] in 2/2020, and PVI with atypical aflutter ablation at [facility] on 2021 with Dr. Nolen).    -Recently admitted to [facility] for 10 days in December for urosepsis and rapid AFIB. Was prescribed Amio 400 mg BID load for 5 days on 1/4/24 and was supposed to decrease on Metoprolol ER from 100 mg BID to 50 mg BID by her outpatient Cardiologist.  However she didn't decrease her Metoprolol.   -EP was consulted in ED. EKG reviewed.  Currently HR in 50's with /56. States feeling better and denies chest pain, sib, palpations. Likely 2/2 from Amio load and too much Metoprolol.   - should restart Amiodarone 200mg qd tomorrow (1/10)  - should restart Metoprolol Succinate 50mg qd tomorrow (1/10)  - f/u TTE --> in Afib, grossly normal LV, dilated RV w/ reduced RV function (performed 1/8)      #Urinary tract infection.   Patient recently admitted (12/17/23-12/27/23) to [facility] for Urosepsis and rapid afib.  Still c/o of mild dysuria today. Denies fever, chills, abd pain, other urinary complaints.   UA + cloudy, leuk esterase, wbc. Lactate 4.9 -> 4.3   S/P CTX x1 in ED (11/8)   - patient denied urinary symptoms and CTX was not continued      #RHODA (acute kidney injury).   Pt with Cr 1.81 (baseline .9-1)  Likely 2/2 to hypotension vs uti      #Afib.   Patient with a longstanding history of atrial fibrillation refractory to ablation, bioprosthetic AVR, CMP with LVEF 40%, and AFIB / flutter.  She had AFlutter ablation at [facility] in 2/2020, and PVI with atypical aflutter ablation at [facility] on 2021 with Dr. Nolen with recent admission to [facility] for urosepsis and rapid AFib.   Previous home meds: Eliquis 5 mg BID, Amiodarone 400 BID load since 1/4/24, Metoprolol 100 BID  -  should restart Amiodarone 200mg qd tomorrow (1/10)  - should restart Metoprolol Succinate 50mg qd tomorrow (1/10)      #H/O aortic valve stenosis.   H/o of A-fib status post ablation, aortic stenosis status post aortic valve replacement, heart failure (echo 10/23 with a EF 40%, AV VR in place, no effusion per her cardiologist Dr Nava)  Home meds: Aspirin 81 mg, Atorvastatin 40 mg qd, furosemide 40 mg qd, metoprolol 100 BID, Spironolactone 25 mg qd, Amioderone 400 BID  -Cont. Aspirin, Atorvastatin  - f/u with Cardiology to restart spironolactone, furosemide.  - start metoprolol succ 50 qd and amiodarone 200mg qd tomorrow (1/10)        Patient was discharged to: home    New medications: Amiodarone 200mg qd, Metoprolol Succinate 50mg qd    Items to follow up as outpatient: f/u Cardiology      Physical exam at the time of discharge:  Constitutional: WDWN, resting comfortably in bed; NAD  Head: NC/AT  Eyes: PERRL, EOMI, anicteric sclera  ENT: no nasal discharge; uvula midline, no oropharyngeal erythema or exudates; MMM  Neck: supple; no JVD or thyromegaly  Respiratory: CTA B/L; no W/R/R, no retractions  Cardiac: +S1/S2; RRR; +sytolic murmur   Gastrointestinal: abdomen soft, NT/ND; +palpable hernia in LLQ  Extremities: WWP, no clubbing or cyanosis; +1 pitting edema   Musculoskeletal: NROM x4; no joint swelling, tenderness or erythema  Neurologic: AAOx3; no focal deficits

## 2024-01-09 NOTE — PROGRESS NOTE ADULT - PROBLEM SELECTOR PLAN 6
-Continue with home imatinib 300 mg PO Qd and allopurinol 100 mg qd - Continue with home imatinib 300 mg PO Qd and allopurinol 100 mg qd

## 2024-01-09 NOTE — PROGRESS NOTE ADULT - SUBJECTIVE AND OBJECTIVE BOX
EPS Progress Note  CC: weakness    S: Feels better today. OOB to chair.   no palpitations, fever or chills.     O: T(C): 36.5 (01-09-24 @ 05:41), Max: 37.1 (01-08-24 @ 16:06)  HR: 58 (01-09-24 @ 05:41) (50 - 58)  BP: 101/64 (01-09-24 @ 05:41) (93/60 - 117/54)  RR: 17 (01-09-24 @ 05:41) (16 - 20)  SpO2: 100% (01-09-24 @ 05:41) (97% - 100%)    PHYSICAL  Constitutional:  NAD        Pulm:  CTA B/L  Cardiac:   + s1/s2, RRR  GI:  +BS , soft ND/NT  Vascular: No LE edema  Neuro: AAO x 3.      LABS:                        8.0    8.75  )-----------( 253      ( 09 Jan 2024 05:30 )             26.3     01-09    133<L>  |  101  |  48<H>  ----------------------------<  149<H>  5.0   |  18<L>  |  2.01<H>    Ca    8.9      09 Jan 2024 05:30  Phos  4.9     01-09  Mg     2.4     01-09    TPro  6.1  /  Alb  3.4  /  TBili  0.7  /  DBili  0.3  /  AST  36  /  ALT  35  /  AlkPhos  94  01-09        MEDICATIONS:  acetaminophen     Tablet .. 650 milliGRAM(s) Oral every 6 hours PRN  allopurinol 100 milliGRAM(s) Oral daily  aluminum hydroxide/magnesium hydroxide/simethicone Suspension 30 milliLiter(s) Oral every 4 hours PRN  apixaban 5 milliGRAM(s) Oral every 12 hours  aspirin  chewable 81 milliGRAM(s) Oral daily  atorvastatin 40 milliGRAM(s) Oral at bedtime  imatinib 300 milliGRAM(s) Oral daily  influenza   Vaccine 0.5 milliLiter(s) IntraMuscular once  melatonin 3 milliGRAM(s) Oral at bedtime PRN  ondansetron Injectable 4 milliGRAM(s) IV Push every 8 hours PRN

## 2024-01-09 NOTE — DISCHARGE NOTE NURSING/CASE MANAGEMENT/SOCIAL WORK - PATIENT PORTAL LINK FT
You can access the FollowMyHealth Patient Portal offered by [facility] by registering at the following website: [email or web address]. By joining [email or web address]’s FollowMyHealth portal, you will also be able to view your health information using other applications (apps) compatible with our system. You can access the FollowMyHealth Patient Portal offered by [facility] by registering at the following website: [email or web address]. By joining [employer]’s FollowMyHealth portal, you will also be able to view your health information using other applications (apps) compatible with our system.

## 2024-01-09 NOTE — PROGRESS NOTE ADULT - PROBLEM SELECTOR PLAN 4
Patient with a longstanding history of atrial fibrillation refractory to ablation, bioprosthetic AVR, CMP with LVEF 40%, and AFIB / flutter.  She had AFlutter ablation at [facility] in 2/2020, and PVI with atypical aflutter ablation at [facility] on 2021 with Dr. Nolen with recent admission to [facility] for urosepsis and rapid AFib.   Home meds: Eliquis 5 mg BID, Amiodarone 400 BID load since 1/4/24, Metoprolol 100 BID    Plan:  -Holding BB and Amiodarone as hypotension and bradycardia likely iatrogenic  -Cont. Eliquis 5 BID   -Daily EKGS Patient with a longstanding history of atrial fibrillation refractory to ablation, bioprosthetic AVR, CMP with LVEF 40%, and AFIB / flutter.  She had AFlutter ablation at [facility] in 2/2020, and PVI with atypical aflutter ablation at [facility] on 2021 with Dr. Nolen with recent admission to [facility] for urosepsis and rapid AFib.   Home meds: Eliquis 5 mg BID, Amiodarone 400 BID load since 1/4/24, Metoprolol 100 BID    Plan:  -Holding BB and Amiodarone as hypotension and bradycardia likely iatrogenic  -Cont. Eliquis 5 BID   -Daily EKGS Patient with a longstanding history of atrial fibrillation refractory to ablation, bioprosthetic AVR, CMP with LVEF 40%, and AFIB / flutter.  She had AFlutter ablation at [facility] in 2/2020, and PVI with atypical aflutter ablation at [facility] on 2021 with Dr. Nolen with recent admission to [facility] for urosepsis and rapid AFib.   Home meds: Eliquis 5 mg BID, Amiodarone 400 BID load since 1/4/24, Metoprolol 100 BID    Plan:  - EP consulted and recommendations appreciated  - Per EP, resume toprol 50 mg qd and amiodarone 200 mg qd  - F/u PCP (Dr. Nava) within a week  - F/u EP (Dr. Nolen) on 2/13 at 1:40 PM  - Cont. Eliquis 5 BID Patient with a longstanding history of atrial fibrillation refractory to ablation, bioprosthetic AVR, CMP with LVEF 40%, and AFIB / flutter.  She had AFlutter ablation at [facility] in 2/2020, and PVI with atypical aflutter ablation at [facility] on 2021 with Dr. Nolen with recent admission to [facility] for urosepsis and rapid AFib.   Home meds: Eliquis 5 mg BID, Amiodarone 400 BID load since 1/4/24, Metoprolol 100 BID    Plan:  - EP consulted and recommendations appreciated  - Per EP, resume toprol 50 mg qd and amiodarone 200 mg qd  - F/u PCP (Dr. Nava) within a week  - F/u EP (Dr. Nolen) on 2/13 at 1:40 PM  - Cont. Eliquis 5 BID

## 2024-01-09 NOTE — DISCHARGE NOTE PROVIDER - DISCHARGE DIET
No aspirin, NSAIDs such as ibuprofen or naproxen, herbal medications and supplements one week prior to surgery. You may use Tylenol for pain prior to surgery. DASH Diet

## 2024-01-09 NOTE — DISCHARGE NOTE PROVIDER - NSDCMRMEDTOKEN_GEN_ALL_CORE_FT
allopurinol 100 mg oral tablet: 1 tab(s) orally once a day (at bedtime)  amiodarone:   apixaban 5 mg oral tablet: 1 tab(s) orally  Aspir 81 oral delayed release tablet: 1 tab(s) orally  atorvastatin 40 mg oral tablet: 1 tab(s) orally  furosemide 40 mg oral tablet: 1 tab(s) orally  imatinib 100 mg oral capsule: 300 milligram(s) orally once a day  metoprolol succinate 100 mg oral capsule, extended release: 1 cap(s) orally  spironolactone 25 mg oral tablet: 1 tab(s) orally   allopurinol 100 mg oral tablet: 1 tab(s) orally once a day (at bedtime)  amiodarone 200 mg oral tablet: 1 tab(s) orally once a day  apixaban 5 mg oral tablet: 1 tab(s) orally every 12 hours  Aspir 81 oral delayed release tablet: 1 tab(s) orally  atorvastatin 40 mg oral tablet: 1 tab(s) orally  imatinib 100 mg oral capsule: 300 milligram(s) orally once a day  Metoprolol Succinate ER 50 mg oral tablet, extended release: 1 tab(s) orally every 24 hours  Pacerone 200 mg oral tablet: 1 tab(s) orally every 24 hours

## 2024-01-09 NOTE — DISCHARGE NOTE PROVIDER - CARE PROVIDER_API CALL
Gume Nolen  Cardiac Electrophysiology  [address], [address] [place] [address]  Phone: [phone]  Fax: [phone]  Established Patient  Follow Up Time:    Gume Nolen  Cardiac Electrophysiology  [address], [address] [place] [address]  Phone: [phone]  Fax: [phone]  Established Patient  Follow Up Time:

## 2024-01-09 NOTE — PROGRESS NOTE ADULT - ASSESSMENT
68 y/o F with morbid obesity, CML (in remission), bioprosthetic AVR, CMP with LVEF 40%, and AFIB / flutter with AFlutter ablation at [facility] in 2/2020, and PVI with atypical aflutter ablation at [facility] on 2021 with Dr. Nolen.  Pt has recent AF RVR in setting of urosepsis (admitted to [facility]).  She was started on Amio load outpatient on 1/4/24 along with her Metoprolol  mg bid.  She presented with sinus /junctional yesterday. She also has a positive UA here.   - EKG today with improved HR -- sinus rhythm 64 bpm.  Qtc 472ms.  Discussed with her that we can resume Toprol 50 mg ONCE daily, and Amio 200 mg daily starting today.   - She should f/u with Dr. Nava within a week.   - She can f/u with Dr. Nolen on 2/13 Tues at 1:40 PM.    - Case d/w DR. Nolen 66 y/o F with morbid obesity, CML (in remission), bioprosthetic AVR, CMP with LVEF 40%, and AFIB / flutter with AFlutter ablation at [facility] in 2/2020, and PVI with atypical aflutter ablation at [facility] on 2021 with Dr. Nolen.  Pt has recent AF RVR in setting of urosepsis (admitted to [facility]).  She was started on Amio load outpatient on 1/4/24 along with her Metoprolol  mg bid.  She presented with sinus /junctional yesterday. She also has a positive UA here.   - EKG today with improved HR -- sinus rhythm 64 bpm.  Qtc 472ms.  Discussed with her that we can resume Toprol 50 mg ONCE daily, and Amio 200 mg daily starting today.   - She should f/u with Dr. Nava within a week.   - She can f/u with Dr. Nolen on 2/13 Tues at 1:40 PM.    - Case d/w DR. Nolen

## 2024-01-09 NOTE — DISCHARGE NOTE PROVIDER - NSDCCPCAREPLAN_GEN_ALL_CORE_FT
PRINCIPAL DISCHARGE DIAGNOSIS  Diagnosis: Bradycardia  Assessment and Plan of Treatment: You were admitted with bradycardia and hypotension that was likely due to taking concomittant amiodarone and metoprolol in relatively high doses. It is important that you continue to monitor for signs of weakness and dizziness, and return to the hospital or your doctors office if you feel these symptoms. Please start taking Amiodarone 200mg a day and Toprol 50mg a day per your Cardiologist's instructions. Start taking these medications on 1/10/24. Please also follow up with cardiology upon discharge.

## 2024-01-09 NOTE — PROGRESS NOTE ADULT - PROBLEM SELECTOR PLAN 5
H/o og A-fib status post ablation, aortic stenosis status post aortic valve replacement, heart failure (echo 10/23 with a EF 40%, AV VR in place, no effusion per her cardiologist Dr Nava)  Home meds: Aspirin 81 mg, Atorvastatin 40 mg qd, furosemide 40 mg qd, metoprolol 100 BID, Spironolactone 25 mg qd, Amioderone 400 BID    Plan:   -Hold BB, Amio and diuretics for now i/s/o of hypotension and RHODA  -Cont. Aspirin, Atorvastatin H/o og A-fib status post ablation, aortic stenosis status post aortic valve replacement, heart failure (echo 10/23 with a EF 40%, AV VR in place, no effusion per her cardiologist Dr Nava)  Home meds: Aspirin 81 mg, Atorvastatin 40 mg qd, furosemide 40 mg qd, metoprolol 100 BID, Spironolactone 25 mg qd, Amioderone 400 BID    Plan:   - EP consulted and recommendations appreciated  - Per EP, resume toprol 50 mg qd and amiodarone 200 mg qd  - F/u PCP (Dr. Nava) within a week  - F/u EP (Dr. Nolen) on 2/13 at 1:40 PM  - Cont. Aspirin, Atorvastatin

## 2024-01-09 NOTE — PROGRESS NOTE ADULT - SUBJECTIVE AND OBJECTIVE BOX
Patient is a 58y old  Female who presents with a chief complaint of bradycardia, hypotension (2024 15:39)      INTERVAL HPI/OVERNIGHT EVENTS:   NAEO. Yesterday TTE was performed, which demonstrated mildly dilated RV size w/ mildly reduced RV function, L pleural effusion, and grossly normal LV size & function.         VITAL SIGNS:  T(C): 36.5 (2024 05:41), Max: 37.1 (2024 16:06)  T(F): 97.7 (2024 05:41), Max: 98.8 (2024 16:06)  HR: 58 (2024 05:41) (48 - 58)  BP: 101/64 (2024 05:41) (93/54 - 159/73)  BP(mean): 79 (2024 20:30) (77 - 79)    RR: 17 (2024 05:41) (16 - 20)  SpO2: 100% (2024 05:41) (97% - 100%)    O2 Parameters below as of 2024 05:41  Patient On (Oxygen Delivery Method): room air        PHYSICAL EXAM:  Constitutional: WDWN, resting comfortably in bed; NAD  Head: NC/AT  Eyes: PERRL, EOMI, anicteric sclera  ENT: no nasal discharge; uvula midline, no oropharyngeal erythema or exudates; MMM  Neck: supple; no JVD or thyromegaly  Respiratory: CTA B/L; no W/R/R, no retractions  Cardiac: +S1/S2; RRR; +sytolic murmur   Gastrointestinal: abdomen soft, NT/ND; +palpable hernia in LLQ  Extremities: WWP, no clubbing or cyanosis; +1 pitting edema   Musculoskeletal: NROM x4; no joint swelling, tenderness or erythema  Neurologic: AAOx3; no focal deficits        LABS:                        8.8    8.40  )-----------( 304      ( 2024 08:25 )             29.1     01-08    134<L>  |  100  |  41<H>  ----------------------------<  180<H>  4.0   |  19<L>  |  1.81<H>    Ca    8.8      2024 08:25        Urinalysis Basic - ( 2024 08:35 )    Color: Dark Yellow / Appearance: Cloudy / S.024 / pH: x  Gluc: x / Ketone: Trace mg/dL  / Bili: Small / Urobili: 1.0 mg/dL   Blood: x / Protein: 300 mg/dL / Nitrite: Negative   Leuk Esterase: Moderate / RBC: 10 /HPF /  /HPF   Sq Epi: x / Non Sq Epi: 4 /HPF / Bacteria: Many /HPF      CAPILLARY BLOOD GLUCOSE            RADIOLOGY & ADDITIONAL TESTS:    Consultant(s) Notes Reviewed:  [x ] YES  [ ] NO    MEDICATIONS  (STANDING):  allopurinol 100 milliGRAM(s) Oral daily  apixaban 5 milliGRAM(s) Oral every 12 hours  aspirin  chewable 81 milliGRAM(s) Oral daily  atorvastatin 40 milliGRAM(s) Oral at bedtime  cefTRIAXone   IVPB 2000 milliGRAM(s) IV Intermittent every 24 hours  imatinib 300 milliGRAM(s) Oral daily  influenza   Vaccine 0.5 milliLiter(s) IntraMuscular once    MEDICATIONS  (PRN):  acetaminophen     Tablet .. 650 milliGRAM(s) Oral every 6 hours PRN Temp greater or equal to 38C (100.4F), Mild Pain (1 - 3)  aluminum hydroxide/magnesium hydroxide/simethicone Suspension 30 milliLiter(s) Oral every 4 hours PRN Dyspepsia  melatonin 3 milliGRAM(s) Oral at bedtime PRN Insomnia  ondansetron Injectable 4 milliGRAM(s) IV Push every 8 hours PRN Nausea and/or Vomiting      Care Discussed with Consultants/Other Providers [ x] YES  [ ] NO Patient is a 58y old  Female who presents with a chief complaint of bradycardia, hypotension (2024 15:39)      INTERVAL HPI/OVERNIGHT EVENTS:   NAEO.     Patient seen at bedside this morning. Patient confirmed home medications. Explained that she recently started amiodarone and metoprolol after her PCP visit (Dr. Nava) on . Prior to admission, she noticed that her heart rate dropped down.        VITAL SIGNS:  T(C): 36.5 (2024 05:41), Max: 37.1 (2024 16:06)  T(F): 97.7 (2024 05:41), Max: 98.8 (2024 16:06)  HR: 58 (2024 05:41) (48 - 58)  BP: 101/64 (2024 05:41) (93/54 - 159/73)  BP(mean): 79 (2024 20:30) (77 - 79)    RR: 17 (2024 05:41) (16 - 20)  SpO2: 100% (2024 05:41) (97% - 100%)    O2 Parameters below as of 2024 05:41  Patient On (Oxygen Delivery Method): room air        PHYSICAL EXAM:  Constitutional: WDWN, resting comfortably in bed; NAD  Head: NC/AT  Eyes: PERRL, EOMI, anicteric sclera  ENT: no nasal discharge; uvula midline, no oropharyngeal erythema or exudates; MMM  Neck: supple; no JVD or thyromegaly  Respiratory: CTA B/L; no W/R/R, no retractions  Cardiac: +S1/S2; RRR; +sytolic murmur   Gastrointestinal: abdomen soft, NT/ND; +palpable hernia in LLQ  Extremities: WWP, no clubbing or cyanosis; +1 pitting edema   Musculoskeletal: NROM x4; no joint swelling, tenderness or erythema  Neurologic: AAOx3; no focal deficits        LABS:                        8.8    8.40  )-----------( 304      ( 2024 08:25 )             29.1     01-08    134<L>  |  100  |  41<H>  ----------------------------<  180<H>  4.0   |  19<L>  |  1.81<H>    Ca    8.8      2024 08:25        Urinalysis Basic - ( 2024 08:35 )    Color: Dark Yellow / Appearance: Cloudy / S.024 / pH: x  Gluc: x / Ketone: Trace mg/dL  / Bili: Small / Urobili: 1.0 mg/dL   Blood: x / Protein: 300 mg/dL / Nitrite: Negative   Leuk Esterase: Moderate / RBC: 10 /HPF /  /HPF   Sq Epi: x / Non Sq Epi: 4 /HPF / Bacteria: Many /HPF      CAPILLARY BLOOD GLUCOSE            RADIOLOGY & ADDITIONAL TESTS:    Consultant(s) Notes Reviewed:  [x ] YES  [ ] NO    MEDICATIONS  (STANDING):  allopurinol 100 milliGRAM(s) Oral daily  apixaban 5 milliGRAM(s) Oral every 12 hours  aspirin  chewable 81 milliGRAM(s) Oral daily  atorvastatin 40 milliGRAM(s) Oral at bedtime  cefTRIAXone   IVPB 2000 milliGRAM(s) IV Intermittent every 24 hours  imatinib 300 milliGRAM(s) Oral daily  influenza   Vaccine 0.5 milliLiter(s) IntraMuscular once    MEDICATIONS  (PRN):  acetaminophen     Tablet .. 650 milliGRAM(s) Oral every 6 hours PRN Temp greater or equal to 38C (100.4F), Mild Pain (1 - 3)  aluminum hydroxide/magnesium hydroxide/simethicone Suspension 30 milliLiter(s) Oral every 4 hours PRN Dyspepsia  melatonin 3 milliGRAM(s) Oral at bedtime PRN Insomnia  ondansetron Injectable 4 milliGRAM(s) IV Push every 8 hours PRN Nausea and/or Vomiting      Care Discussed with Consultants/Other Providers [ x] YES  [ ] NO Patient is a 58y old  Female who presents with a chief complaint of bradycardia, hypotension (2024 15:39)      INTERVAL HPI/OVERNIGHT EVENTS:   NAEO.     Patient seen at bedside this morning. Patient confirmed home medications. Explained that she recently started amiodarone and metoprolol after her PCP visit (Dr. Nava) on . Prior to admission, she noticed that her heart rate dropped down. She is feeling better today and feels ready to go home.        VITAL SIGNS:  T(C): 36.5 (2024 05:41), Max: 37.1 (2024 16:06)  T(F): 97.7 (2024 05:41), Max: 98.8 (2024 16:06)  HR: 58 (2024 05:41) (48 - 58)  BP: 101/64 (2024 05:41) (93/54 - 159/73)  BP(mean): 79 (2024 20:30) (77 - 79)    RR: 17 (2024 05:41) (16 - 20)  SpO2: 100% (2024 05:41) (97% - 100%)    O2 Parameters below as of 2024 05:41  Patient On (Oxygen Delivery Method): room air        PHYSICAL EXAM:  Constitutional: WDWN, resting comfortably in bed; NAD  Head: NC/AT  Eyes: PERRL, EOMI, anicteric sclera  ENT: no nasal discharge; uvula midline, no oropharyngeal erythema or exudates; MMM  Neck: supple; no JVD or thyromegaly  Respiratory: CTA B/L; no W/R/R, no retractions  Cardiac: +S1/S2; RRR; +sytolic murmur   Gastrointestinal: abdomen soft, NT/ND; +palpable hernia in LLQ  Extremities: WWP, no clubbing or cyanosis; +1 pitting edema   Musculoskeletal: NROM x4; no joint swelling, tenderness or erythema  Neurologic: AAOx3; no focal deficits        LABS:                        8.8    8.40  )-----------( 304      ( 2024 08:25 )             29.1     01-08    134<L>  |  100  |  41<H>  ----------------------------<  180<H>  4.0   |  19<L>  |  1.81<H>    Ca    8.8      2024 08:25        Urinalysis Basic - ( 2024 08:35 )    Color: Dark Yellow / Appearance: Cloudy / S.024 / pH: x  Gluc: x / Ketone: Trace mg/dL  / Bili: Small / Urobili: 1.0 mg/dL   Blood: x / Protein: 300 mg/dL / Nitrite: Negative   Leuk Esterase: Moderate / RBC: 10 /HPF /  /HPF   Sq Epi: x / Non Sq Epi: 4 /HPF / Bacteria: Many /HPF      CAPILLARY BLOOD GLUCOSE            RADIOLOGY & ADDITIONAL TESTS:    Consultant(s) Notes Reviewed:  [x ] YES  [ ] NO    MEDICATIONS  (STANDING):  allopurinol 100 milliGRAM(s) Oral daily  apixaban 5 milliGRAM(s) Oral every 12 hours  aspirin  chewable 81 milliGRAM(s) Oral daily  atorvastatin 40 milliGRAM(s) Oral at bedtime  cefTRIAXone   IVPB 2000 milliGRAM(s) IV Intermittent every 24 hours  imatinib 300 milliGRAM(s) Oral daily  influenza   Vaccine 0.5 milliLiter(s) IntraMuscular once    MEDICATIONS  (PRN):  acetaminophen     Tablet .. 650 milliGRAM(s) Oral every 6 hours PRN Temp greater or equal to 38C (100.4F), Mild Pain (1 - 3)  aluminum hydroxide/magnesium hydroxide/simethicone Suspension 30 milliLiter(s) Oral every 4 hours PRN Dyspepsia  melatonin 3 milliGRAM(s) Oral at bedtime PRN Insomnia  ondansetron Injectable 4 milliGRAM(s) IV Push every 8 hours PRN Nausea and/or Vomiting      Care Discussed with Consultants/Other Providers [ x] YES  [ ] NO

## 2024-01-11 RX ORDER — AMIODARONE HYDROCHLORIDE 200 MG/1
200 TABLET ORAL DAILY
Qty: 1 | Refills: 0 | Status: ACTIVE | COMMUNITY
Start: 2024-01-04

## 2024-01-11 RX ORDER — SPIRONOLACTONE 25 MG/1
25 TABLET ORAL
Refills: 0 | Status: DISCONTINUED | COMMUNITY
End: 2024-01-11

## 2024-01-11 RX ORDER — FUROSEMIDE 40 MG/1
40 TABLET ORAL
Qty: 120 | Refills: 0 | Status: DISCONTINUED | COMMUNITY
Start: 2023-05-12 | End: 2024-01-11

## 2024-01-11 RX ORDER — METOPROLOL SUCCINATE 50 MG/1
50 TABLET, EXTENDED RELEASE ORAL DAILY
Qty: 1 | Refills: 1 | Status: ACTIVE | COMMUNITY
Start: 2022-02-27

## 2024-01-12 LAB
-  AMPICILLIN/SULBACTAM: SIGNIFICANT CHANGE UP
-  AMPICILLIN: SIGNIFICANT CHANGE UP
-  CEFAZOLIN: SIGNIFICANT CHANGE UP
-  CEFTRIAXONE: SIGNIFICANT CHANGE UP
-  CIPROFLOXACIN: SIGNIFICANT CHANGE UP
-  ERTAPENEM: SIGNIFICANT CHANGE UP
-  GENTAMICIN: SIGNIFICANT CHANGE UP
-  MEROPENEM: SIGNIFICANT CHANGE UP
-  MEROPENEM: SIGNIFICANT CHANGE UP
-  NITROFURANTOIN: SIGNIFICANT CHANGE UP
-  PIPERACILLIN/TAZOBACTAM: SIGNIFICANT CHANGE UP
-  TOBRAMYCIN: SIGNIFICANT CHANGE UP
-  TRIMETHOPRIM/SULFAMETHOXAZOLE: SIGNIFICANT CHANGE UP
BLANDM BLD POS QL PROBE: SIGNIFICANT CHANGE UP
BLANDM BLD POS QL PROBE: SIGNIFICANT CHANGE UP
CULTURE RESULTS: ABNORMAL
CULTURE RESULTS: ABNORMAL
METHOD TYPE: SIGNIFICANT CHANGE UP
ORGANISM # SPEC MICROSCOPIC CNT: ABNORMAL
ORGANISM # SPEC MICROSCOPIC CNT: SIGNIFICANT CHANGE UP
ORGANISM # SPEC MICROSCOPIC CNT: SIGNIFICANT CHANGE UP
SPECIMEN SOURCE: SIGNIFICANT CHANGE UP
SPECIMEN SOURCE: SIGNIFICANT CHANGE UP

## 2024-01-13 LAB
-  CLINDAMYCIN: SIGNIFICANT CHANGE UP
-  CLINDAMYCIN: SIGNIFICANT CHANGE UP
-  ERYTHROMYCIN: SIGNIFICANT CHANGE UP
-  ERYTHROMYCIN: SIGNIFICANT CHANGE UP
-  LINEZOLID: SIGNIFICANT CHANGE UP
-  LINEZOLID: SIGNIFICANT CHANGE UP
-  OXACILLIN: SIGNIFICANT CHANGE UP
-  OXACILLIN: SIGNIFICANT CHANGE UP
-  RIFAMPIN: SIGNIFICANT CHANGE UP
-  RIFAMPIN: SIGNIFICANT CHANGE UP
-  TRIMETHOPRIM/SULFAMETHOXAZOLE: SIGNIFICANT CHANGE UP
-  TRIMETHOPRIM/SULFAMETHOXAZOLE: SIGNIFICANT CHANGE UP
-  VANCOMYCIN: SIGNIFICANT CHANGE UP
-  VANCOMYCIN: SIGNIFICANT CHANGE UP
CULTURE RESULTS: ABNORMAL
CULTURE RESULTS: ABNORMAL
CULTURE RESULTS: SIGNIFICANT CHANGE UP
CULTURE RESULTS: SIGNIFICANT CHANGE UP
METHOD TYPE: SIGNIFICANT CHANGE UP
METHOD TYPE: SIGNIFICANT CHANGE UP
ORGANISM # SPEC MICROSCOPIC CNT: ABNORMAL
ORGANISM # SPEC MICROSCOPIC CNT: SIGNIFICANT CHANGE UP
ORGANISM # SPEC MICROSCOPIC CNT: SIGNIFICANT CHANGE UP
SPECIMEN SOURCE: SIGNIFICANT CHANGE UP

## 2024-01-19 DIAGNOSIS — I50.9 HEART FAILURE, UNSPECIFIED: ICD-10-CM

## 2024-01-19 DIAGNOSIS — Z88.0 ALLERGY STATUS TO PENICILLIN: ICD-10-CM

## 2024-01-19 DIAGNOSIS — I48.91 UNSPECIFIED ATRIAL FIBRILLATION: ICD-10-CM

## 2024-01-19 DIAGNOSIS — Z95.3 PRESENCE OF XENOGENIC HEART VALVE: ICD-10-CM

## 2024-01-19 DIAGNOSIS — C92.11 CHRONIC MYELOID LEUKEMIA, BCR/ABL-POSITIVE, IN REMISSION: ICD-10-CM

## 2024-01-19 DIAGNOSIS — T46.2X5A ADVERSE EFFECT OF OTHER ANTIDYSRHYTHMIC DRUGS, INITIAL ENCOUNTER: ICD-10-CM

## 2024-01-19 DIAGNOSIS — N39.0 URINARY TRACT INFECTION, SITE NOT SPECIFIED: ICD-10-CM

## 2024-01-19 DIAGNOSIS — T44.7X1A POISONING BY BETA-ADRENORECEPTOR ANTAGONISTS, ACCIDENTAL (UNINTENTIONAL), INITIAL ENCOUNTER: ICD-10-CM

## 2024-01-19 DIAGNOSIS — Z79.82 LONG TERM (CURRENT) USE OF ASPIRIN: ICD-10-CM

## 2024-01-19 DIAGNOSIS — Z79.01 LONG TERM (CURRENT) USE OF ANTICOAGULANTS: ICD-10-CM

## 2024-01-19 DIAGNOSIS — R00.1 BRADYCARDIA, UNSPECIFIED: ICD-10-CM

## 2024-01-19 DIAGNOSIS — N17.9 ACUTE KIDNEY FAILURE, UNSPECIFIED: ICD-10-CM

## 2024-01-19 DIAGNOSIS — Z79.899 OTHER LONG TERM (CURRENT) DRUG THERAPY: ICD-10-CM

## 2024-01-19 DIAGNOSIS — I95.2 HYPOTENSION DUE TO DRUGS: ICD-10-CM

## 2024-01-19 DIAGNOSIS — Z98.84 BARIATRIC SURGERY STATUS: ICD-10-CM

## 2024-01-19 DIAGNOSIS — E78.00 PURE HYPERCHOLESTEROLEMIA, UNSPECIFIED: ICD-10-CM

## 2024-02-13 ENCOUNTER — APPOINTMENT (OUTPATIENT)
Dept: HEART AND VASCULAR | Facility: CLINIC | Age: 59
End: 2024-02-13

## 2024-04-26 LAB
CULTURE RESULTS: ABNORMAL
GRAM STN FLD: ABNORMAL
SPECIMEN SOURCE: SIGNIFICANT CHANGE UP

## 2024-05-14 ENCOUNTER — RX RENEWAL (OUTPATIENT)
Age: 59
End: 2024-05-14
